# Patient Record
Sex: MALE | Race: WHITE | NOT HISPANIC OR LATINO | Employment: UNEMPLOYED | URBAN - NONMETROPOLITAN AREA
[De-identification: names, ages, dates, MRNs, and addresses within clinical notes are randomized per-mention and may not be internally consistent; named-entity substitution may affect disease eponyms.]

---

## 2019-08-16 ENCOUNTER — APPOINTMENT (OUTPATIENT)
Dept: CT IMAGING | Facility: HOSPITAL | Age: 44
End: 2019-08-16

## 2019-08-16 ENCOUNTER — HOSPITAL ENCOUNTER (INPATIENT)
Facility: HOSPITAL | Age: 44
LOS: 5 days | Discharge: HOME | End: 2019-08-21
Attending: INTERNAL MEDICINE | Admitting: HOSPITALIST

## 2019-08-16 DIAGNOSIS — I74.10 THROMBUS OF AORTA (HCC): Primary | ICD-10-CM

## 2019-08-16 PROBLEM — I99.8 ISCHEMIC TOE: Status: ACTIVE | Noted: 2019-08-16

## 2019-08-16 PROCEDURE — 94760 N-INVAS EAR/PLS OXIMETRY 1: CPT

## 2019-08-16 PROCEDURE — 71275 CT ANGIOGRAPHY CHEST: CPT

## 2019-08-16 PROCEDURE — G0378 HOSPITAL OBSERVATION PER HR: HCPCS

## 2019-08-16 PROCEDURE — 75635 CT ANGIO ABDOMINAL ARTERIES: CPT

## 2019-08-16 PROCEDURE — 84484 ASSAY OF TROPONIN QUANT: CPT | Performed by: INTERNAL MEDICINE

## 2019-08-16 PROCEDURE — 83036 HEMOGLOBIN GLYCOSYLATED A1C: CPT | Performed by: INTERNAL MEDICINE

## 2019-08-16 PROCEDURE — 94799 UNLISTED PULMONARY SVC/PX: CPT

## 2019-08-16 PROCEDURE — 93005 ELECTROCARDIOGRAM TRACING: CPT | Performed by: INTERNAL MEDICINE

## 2019-08-16 RX ORDER — HYDROCODONE BITARTRATE AND ACETAMINOPHEN 5; 325 MG/1; MG/1
1 TABLET ORAL EVERY 6 HOURS PRN
Status: DISCONTINUED | OUTPATIENT
Start: 2019-08-16 | End: 2019-08-21 | Stop reason: HOSPADM

## 2019-08-16 RX ORDER — ACETAMINOPHEN 650 MG/1
650 SUPPOSITORY RECTAL EVERY 4 HOURS PRN
Status: DISCONTINUED | OUTPATIENT
Start: 2019-08-16 | End: 2019-08-21 | Stop reason: HOSPADM

## 2019-08-16 RX ORDER — SODIUM CHLORIDE 9 MG/ML
50 INJECTION, SOLUTION INTRAVENOUS CONTINUOUS
Status: DISCONTINUED | OUTPATIENT
Start: 2019-08-16 | End: 2019-08-18

## 2019-08-16 RX ORDER — ACETAMINOPHEN 160 MG/5ML
650 SOLUTION ORAL EVERY 4 HOURS PRN
Status: DISCONTINUED | OUTPATIENT
Start: 2019-08-16 | End: 2019-08-21 | Stop reason: HOSPADM

## 2019-08-16 RX ORDER — SODIUM CHLORIDE 0.9 % (FLUSH) 0.9 %
3-10 SYRINGE (ML) INJECTION AS NEEDED
Status: DISCONTINUED | OUTPATIENT
Start: 2019-08-16 | End: 2019-08-21 | Stop reason: HOSPADM

## 2019-08-16 RX ORDER — ONDANSETRON 2 MG/ML
4 INJECTION INTRAMUSCULAR; INTRAVENOUS EVERY 6 HOURS PRN
Status: DISCONTINUED | OUTPATIENT
Start: 2019-08-16 | End: 2019-08-21 | Stop reason: HOSPADM

## 2019-08-16 RX ORDER — ACETAMINOPHEN 325 MG/1
650 TABLET ORAL EVERY 4 HOURS PRN
Status: DISCONTINUED | OUTPATIENT
Start: 2019-08-16 | End: 2019-08-21 | Stop reason: HOSPADM

## 2019-08-16 RX ORDER — SODIUM CHLORIDE 0.9 % (FLUSH) 0.9 %
3 SYRINGE (ML) INJECTION EVERY 12 HOURS SCHEDULED
Status: DISCONTINUED | OUTPATIENT
Start: 2019-08-16 | End: 2019-08-20

## 2019-08-16 RX ORDER — NICOTINE 21 MG/24HR
1 PATCH, TRANSDERMAL 24 HOURS TRANSDERMAL
Status: DISCONTINUED | OUTPATIENT
Start: 2019-08-17 | End: 2019-08-21 | Stop reason: HOSPADM

## 2019-08-16 RX ADMIN — IOPAMIDOL 200 ML: 755 INJECTION, SOLUTION INTRAVENOUS at 23:28

## 2019-08-17 ENCOUNTER — APPOINTMENT (OUTPATIENT)
Dept: CARDIOLOGY | Facility: HOSPITAL | Age: 44
End: 2019-08-17

## 2019-08-17 PROBLEM — Z72.0 TOBACCO ABUSE: Status: ACTIVE | Noted: 2019-08-17

## 2019-08-17 PROBLEM — I74.10 AORTIC THROMBUS (HCC): Status: ACTIVE | Noted: 2019-08-17

## 2019-08-17 PROBLEM — E78.2 MIXED HYPERLIPIDEMIA: Status: ACTIVE | Noted: 2019-08-17

## 2019-08-17 PROBLEM — I74.10 THROMBUS OF AORTA: Status: ACTIVE | Noted: 2019-08-17

## 2019-08-17 LAB
ANION GAP SERPL CALCULATED.3IONS-SCNC: 7 MMOL/L (ref 4–13)
APTT PPP: 122.6 SECONDS (ref 24.1–35)
APTT PPP: 32.1 SECONDS (ref 24.1–35)
APTT PPP: 63.6 SECONDS (ref 24.1–35)
ARTICHOKE IGE QN: 117 MG/DL (ref 0–99)
BASOPHILS # BLD AUTO: 0.08 10*3/MM3 (ref 0–0.2)
BASOPHILS NFR BLD AUTO: 0.8 % (ref 0–1.5)
BH CV ECHO MEAS - AO MAX PG (FULL): 1.8 MMHG
BH CV ECHO MEAS - AO MAX PG: 5.3 MMHG
BH CV ECHO MEAS - AO MEAN PG (FULL): 1 MMHG
BH CV ECHO MEAS - AO MEAN PG: 3 MMHG
BH CV ECHO MEAS - AO ROOT AREA (BSA CORRECTED): 1.5
BH CV ECHO MEAS - AO ROOT AREA: 7.1 CM^2
BH CV ECHO MEAS - AO ROOT DIAM: 3 CM
BH CV ECHO MEAS - AO V2 MAX: 115 CM/SEC
BH CV ECHO MEAS - AO V2 MEAN: 88.1 CM/SEC
BH CV ECHO MEAS - AO V2 VTI: 21.3 CM
BH CV ECHO MEAS - AVA(I,A): 3.1 CM^2
BH CV ECHO MEAS - AVA(I,D): 3.1 CM^2
BH CV ECHO MEAS - AVA(V,A): 3.4 CM^2
BH CV ECHO MEAS - AVA(V,D): 3.4 CM^2
BH CV ECHO MEAS - BSA(HAYCOCK): 2 M^2
BH CV ECHO MEAS - BSA: 2 M^2
BH CV ECHO MEAS - BZI_BMI: 26.4 KILOGRAMS/M^2
BH CV ECHO MEAS - BZI_METRIC_HEIGHT: 177.8 CM
BH CV ECHO MEAS - BZI_METRIC_WEIGHT: 83.5 KG
BH CV ECHO MEAS - EDV(CUBED): 128.8 ML
BH CV ECHO MEAS - EDV(MOD-SP4): 122 ML
BH CV ECHO MEAS - EDV(TEICH): 121 ML
BH CV ECHO MEAS - EF(CUBED): 73.1 %
BH CV ECHO MEAS - EF(MOD-SP4): 48.4 %
BH CV ECHO MEAS - EF(TEICH): 64.6 %
BH CV ECHO MEAS - ESV(CUBED): 34.6 ML
BH CV ECHO MEAS - ESV(MOD-SP4): 63 ML
BH CV ECHO MEAS - ESV(TEICH): 42.8 ML
BH CV ECHO MEAS - FS: 35.4 %
BH CV ECHO MEAS - IVS/LVPW: 1.3
BH CV ECHO MEAS - IVSD: 0.92 CM
BH CV ECHO MEAS - LA DIMENSION: 3.4 CM
BH CV ECHO MEAS - LA/AO: 1.1
BH CV ECHO MEAS - LAT PEAK E' VEL: 6.9 CM/SEC
BH CV ECHO MEAS - LV DIASTOLIC VOL/BSA (35-75): 60.6 ML/M^2
BH CV ECHO MEAS - LV MASS(C)D: 142.5 GRAMS
BH CV ECHO MEAS - LV MASS(C)DI: 70.7 GRAMS/M^2
BH CV ECHO MEAS - LV MAX PG: 3.5 MMHG
BH CV ECHO MEAS - LV MEAN PG: 2 MMHG
BH CV ECHO MEAS - LV SYSTOLIC VOL/BSA (12-30): 31.3 ML/M^2
BH CV ECHO MEAS - LV V1 MAX: 93.8 CM/SEC
BH CV ECHO MEAS - LV V1 MEAN: 60.1 CM/SEC
BH CV ECHO MEAS - LV V1 VTI: 16 CM
BH CV ECHO MEAS - LVIDD: 5.1 CM
BH CV ECHO MEAS - LVIDS: 3.3 CM
BH CV ECHO MEAS - LVLD AP4: 8.4 CM
BH CV ECHO MEAS - LVLS AP4: 7.8 CM
BH CV ECHO MEAS - LVOT AREA (M): 4.2 CM^2
BH CV ECHO MEAS - LVOT AREA: 4.2 CM^2
BH CV ECHO MEAS - LVOT DIAM: 2.3 CM
BH CV ECHO MEAS - LVPWD: 0.72 CM
BH CV ECHO MEAS - MED PEAK E' VEL: 7.83 CM/SEC
BH CV ECHO MEAS - MV A MAX VEL: 55.8 CM/SEC
BH CV ECHO MEAS - MV DEC SLOPE: 306 CM/SEC^2
BH CV ECHO MEAS - MV DEC TIME: 0.23 SEC
BH CV ECHO MEAS - MV E MAX VEL: 68.9 CM/SEC
BH CV ECHO MEAS - MV E/A: 1.2
BH CV ECHO MEAS - SI(AO): 74.7 ML/M^2
BH CV ECHO MEAS - SI(CUBED): 46.7 ML/M^2
BH CV ECHO MEAS - SI(LVOT): 33 ML/M^2
BH CV ECHO MEAS - SI(MOD-SP4): 29.3 ML/M^2
BH CV ECHO MEAS - SI(TEICH): 38.8 ML/M^2
BH CV ECHO MEAS - SV(AO): 150.6 ML
BH CV ECHO MEAS - SV(CUBED): 94.1 ML
BH CV ECHO MEAS - SV(LVOT): 66.5 ML
BH CV ECHO MEAS - SV(MOD-SP4): 59 ML
BH CV ECHO MEAS - SV(TEICH): 78.2 ML
BH CV ECHO MEASUREMENTS AVERAGE E/E' RATIO: 9.36
BUN BLD-MCNC: 6 MG/DL (ref 5–21)
BUN/CREAT SERPL: 5.9 (ref 7–25)
CALCIUM SPEC-SCNC: 9.6 MG/DL (ref 8.4–10.4)
CHLORIDE SERPL-SCNC: 102 MMOL/L (ref 98–110)
CHOLEST SERPL-MCNC: 163 MG/DL (ref 130–200)
CO2 SERPL-SCNC: 30 MMOL/L (ref 24–31)
CREAT BLD-MCNC: 1.02 MG/DL (ref 0.5–1.4)
DEPRECATED RDW RBC AUTO: 41.2 FL (ref 37–54)
EOSINOPHIL # BLD AUTO: 0.24 10*3/MM3 (ref 0–0.4)
EOSINOPHIL NFR BLD AUTO: 2.4 % (ref 0.3–6.2)
ERYTHROCYTE [DISTWIDTH] IN BLOOD BY AUTOMATED COUNT: 13 % (ref 12.3–15.4)
GFR SERPL CREATININE-BSD FRML MDRD: 79 ML/MIN/1.73
GLUCOSE BLD-MCNC: 91 MG/DL (ref 70–100)
HBA1C MFR BLD: 5.9 % (ref 4.8–5.9)
HCT VFR BLD AUTO: 49.5 % (ref 37.5–51)
HDLC SERPL-MCNC: 21 MG/DL
HGB BLD-MCNC: 16.9 G/DL (ref 13–17.7)
IMM GRANULOCYTES # BLD AUTO: 0.02 10*3/MM3 (ref 0–0.05)
IMM GRANULOCYTES NFR BLD AUTO: 0.2 % (ref 0–0.5)
INR PPP: 0.92 (ref 0.91–1.09)
LDLC/HDLC SERPL: 4.51 {RATIO}
LEFT ATRIUM VOLUME INDEX: 15.2 ML/M2
LEFT ATRIUM VOLUME: 30.5 CM3
LV EF 2D ECHO EST: 60 %
LYMPHOCYTES # BLD AUTO: 3.42 10*3/MM3 (ref 0.7–3.1)
LYMPHOCYTES NFR BLD AUTO: 34 % (ref 19.6–45.3)
MAXIMAL PREDICTED HEART RATE: 176 BPM
MCH RBC QN AUTO: 30 PG (ref 26.6–33)
MCHC RBC AUTO-ENTMCNC: 34.1 G/DL (ref 31.5–35.7)
MCV RBC AUTO: 87.8 FL (ref 79–97)
MONOCYTES # BLD AUTO: 0.68 10*3/MM3 (ref 0.1–0.9)
MONOCYTES NFR BLD AUTO: 6.8 % (ref 5–12)
NEUTROPHILS # BLD AUTO: 5.63 10*3/MM3 (ref 1.7–7)
NEUTROPHILS NFR BLD AUTO: 55.8 % (ref 42.7–76)
NRBC BLD AUTO-RTO: 0 /100 WBC (ref 0–0.2)
PLATELET # BLD AUTO: 285 10*3/MM3 (ref 140–450)
PMV BLD AUTO: 9.5 FL (ref 6–12)
POTASSIUM BLD-SCNC: 4.7 MMOL/L (ref 3.5–5.3)
PROTHROMBIN TIME: 12.6 SECONDS (ref 11.9–14.6)
RBC # BLD AUTO: 5.64 10*6/MM3 (ref 4.14–5.8)
SODIUM BLD-SCNC: 139 MMOL/L (ref 135–145)
STRESS TARGET HR: 150 BPM
TRIGL SERPL-MCNC: 236 MG/DL (ref 0–149)
TROPONIN I SERPL-MCNC: 0.03 NG/ML (ref 0–0.03)
TROPONIN I SERPL-MCNC: 0.03 NG/ML (ref 0–0.03)
WBC NRBC COR # BLD: 10.07 10*3/MM3 (ref 3.4–10.8)

## 2019-08-17 PROCEDURE — 85730 THROMBOPLASTIN TIME PARTIAL: CPT | Performed by: SURGERY

## 2019-08-17 PROCEDURE — 0 IOPAMIDOL PER 1 ML: Performed by: INTERNAL MEDICINE

## 2019-08-17 PROCEDURE — 93306 TTE W/DOPPLER COMPLETE: CPT

## 2019-08-17 PROCEDURE — 99222 1ST HOSP IP/OBS MODERATE 55: CPT | Performed by: SURGERY

## 2019-08-17 PROCEDURE — 25010000002 HEPARIN (PORCINE) PER 1000 UNITS: Performed by: SURGERY

## 2019-08-17 PROCEDURE — 93306 TTE W/DOPPLER COMPLETE: CPT | Performed by: INTERNAL MEDICINE

## 2019-08-17 PROCEDURE — 85610 PROTHROMBIN TIME: CPT | Performed by: SURGERY

## 2019-08-17 PROCEDURE — 80061 LIPID PANEL: CPT | Performed by: INTERNAL MEDICINE

## 2019-08-17 PROCEDURE — 84484 ASSAY OF TROPONIN QUANT: CPT | Performed by: INTERNAL MEDICINE

## 2019-08-17 PROCEDURE — 80048 BASIC METABOLIC PNL TOTAL CA: CPT | Performed by: INTERNAL MEDICINE

## 2019-08-17 PROCEDURE — 93010 ELECTROCARDIOGRAM REPORT: CPT | Performed by: INTERNAL MEDICINE

## 2019-08-17 PROCEDURE — 94799 UNLISTED PULMONARY SVC/PX: CPT

## 2019-08-17 PROCEDURE — 94760 N-INVAS EAR/PLS OXIMETRY 1: CPT

## 2019-08-17 PROCEDURE — 85025 COMPLETE CBC W/AUTO DIFF WBC: CPT | Performed by: INTERNAL MEDICINE

## 2019-08-17 RX ORDER — HYDROCODONE BITARTRATE AND ACETAMINOPHEN 10; 325 MG/1; MG/1
1 TABLET ORAL EVERY 6 HOURS PRN
Status: DISCONTINUED | OUTPATIENT
Start: 2019-08-17 | End: 2019-08-21 | Stop reason: HOSPADM

## 2019-08-17 RX ORDER — HEPARIN SODIUM 1000 [USP'U]/ML
80 INJECTION, SOLUTION INTRAVENOUS; SUBCUTANEOUS ONCE
Status: COMPLETED | OUTPATIENT
Start: 2019-08-17 | End: 2019-08-17

## 2019-08-17 RX ORDER — HEPARIN SODIUM 10000 [USP'U]/100ML
1500 INJECTION, SOLUTION INTRAVENOUS
Status: DISCONTINUED | OUTPATIENT
Start: 2019-08-17 | End: 2019-08-20

## 2019-08-17 RX ORDER — HEPARIN SODIUM 1000 [USP'U]/ML
80 INJECTION, SOLUTION INTRAVENOUS; SUBCUTANEOUS AS NEEDED
Status: DISCONTINUED | OUTPATIENT
Start: 2019-08-17 | End: 2019-08-20

## 2019-08-17 RX ORDER — ATORVASTATIN CALCIUM 40 MG/1
40 TABLET, FILM COATED ORAL NIGHTLY
Status: DISCONTINUED | OUTPATIENT
Start: 2019-08-17 | End: 2019-08-21 | Stop reason: HOSPADM

## 2019-08-17 RX ORDER — HEPARIN SODIUM 1000 [USP'U]/ML
40 INJECTION, SOLUTION INTRAVENOUS; SUBCUTANEOUS AS NEEDED
Status: DISCONTINUED | OUTPATIENT
Start: 2019-08-17 | End: 2019-08-20

## 2019-08-17 RX ADMIN — NICOTINE 1 PATCH: 21 PATCH, EXTENDED RELEASE TRANSDERMAL at 09:37

## 2019-08-17 RX ADMIN — SODIUM CHLORIDE 50 ML/HR: 9 INJECTION, SOLUTION INTRAVENOUS at 17:03

## 2019-08-17 RX ADMIN — SODIUM CHLORIDE 75 ML/HR: 9 INJECTION, SOLUTION INTRAVENOUS at 00:06

## 2019-08-17 RX ADMIN — DESMOPRESSIN ACETATE 40 MG: 0.2 TABLET ORAL at 20:22

## 2019-08-17 RX ADMIN — HEPARIN SODIUM 3340 UNITS: 1000 INJECTION, SOLUTION INTRAVENOUS; SUBCUTANEOUS at 20:50

## 2019-08-17 RX ADMIN — HEPARIN SODIUM 1500 UNITS/HR: 10000 INJECTION, SOLUTION INTRAVENOUS at 08:40

## 2019-08-17 RX ADMIN — HEPARIN SODIUM 6690 UNITS: 1000 INJECTION INTRAVENOUS; SUBCUTANEOUS at 08:39

## 2019-08-17 NOTE — H&P
[facility]  HISTORY AND PHYSICAL    Date of Admission: 8/16/2019  Primary Care Physician: Provider, No Known    Subjective     Chief Complaint: transferred from outside hospital for vascular surgeon evaluation due to concern for ischemic toe    History of Present Illness  44-year old man transferred from [facility] for further evaluation and management of concern for ischemic right big toe.  Symptoms of pain had been ongoing for the past couple of weeks.  Patient reportedly noted pinkish discoloration of right big toe which evolved into red and blue and purple dusky discoloration.  He has no significant past medical history.  He only smokes a pack to 1-1/2 pack/day since teenager.  He is not aware of his cholesterol.  I am told by transferring physician that there was T wave inversions in V4 to V6 and has poor R wave progression on his chest leads does the requested for hospitalist service instead of admitting to vascular surgery.  I have confirmed the EKG and has no prior EKG for comparison.  He denies any chest pain, shortness of breath, palpitation, dizziness,.  Troponin is negative from transferring facility.  He received Lovenox treatment dose prior to transfer.  I discussed case with Dr. Rivera who recommends CT angiogram of the chest, CT of the abdominal aorta bilateral iliofemoral runoff with and without contrast and an echocardiogram.   No further anticoagulation needed at this time until seen by vascular surgeon as discussed.    ALBERTO on the left is 0.96 while on the right is 1.01.  DPA is 82, normal until blood gets to the right toe and reportedly has flat line.  Review of Systems   No cp, no sob  No palpitation  No dizziness  No issue on BM, urination  No headache  No fever  No hx of syncope  Otherwise complete ROS reviewed and negative except as mentioned in the HPI.    Past Medical History: no endorsed medical problem  Past  "Surgical History: none  Social History:  1 pack to 1 1/2 pack since teenager. Seldom drinks alcohol; last drink 1 month ago  Denies any recreational drug use;     Family History:  Mom had bell's palsy; dad passed on with \"three different kidn of cancers\".  Said he  Had brain cancer  Allergies:   NKDA  Medications: None  Prior to Admission medications    Not on File     Objective     Vital Signs: /74 (BP Location: Left arm, Patient Position: Lying)   Pulse 66   Temp 98 °F (36.7 °C) (Oral)   Resp 16   Ht 177.8 cm (70\")   Wt 83.6 kg (184 lb 4.9 oz)   SpO2 96%   BMI 26.44 kg/m²   Physical Exam   Constitutional: He is oriented to person, place, and time. He appears well-developed. No distress.   HENT:   Head: Normocephalic and atraumatic.   Right Ear: External ear normal.   Left Ear: External ear normal.   Nose: Nose normal.   Mouth/Throat: Oropharynx is clear and moist. No oropharyngeal exudate.   Eyes: Conjunctivae and EOM are normal. Pupils are equal, round, and reactive to light. Right eye exhibits no discharge. Left eye exhibits no discharge. No scleral icterus.   Neck: Normal range of motion. Neck supple. No JVD present. No tracheal deviation present. No thyromegaly present.   No carotid bruit   Cardiovascular: Normal rate, regular rhythm and normal heart sounds. Exam reveals no friction rub.   No murmur heard.  +1 pulses of the posterior tibialis bilateral and dorsalis pedis on left foot; diminished on the right Acharya pedis.   Pulmonary/Chest: Effort normal and breath sounds normal. No stridor. No respiratory distress. He has no wheezes. He has no rales.   Abdominal: Soft. Bowel sounds are normal. He exhibits no distension and no mass. There is no tenderness. There is no rebound and no guarding.   Musculoskeletal: Normal range of motion. He exhibits no tenderness or deformity.   Purplish discoloration of the plantar surface of right big toe with normal extension versus unkempt foot same discoloration. "   Neurological: He is alert and oriented to person, place, and time. He displays normal reflexes. No cranial nerve deficit. He exhibits normal muscle tone. Coordination normal.   Skin: Skin is warm and dry. Capillary refill takes less than 2 seconds. He is not diaphoretic.   Colder right big toe compared to other foot. No gross tenderness or differential warmth   Psychiatric: He has a normal mood and affect. His behavior is normal. Judgment and thought content normal.   Vitals reviewed.        Results Reviewed:  Ankle-brachial index as reported above  White count was 10.5, hemoglobin 16.8, hematocrit 51, platelet of 287  CRP is 10.3  PT is 10.2, INR 1  Sodium 137, potassium 4.5, chloride 100, CO2 30, glucose 94, creatinine 1.3, total protein 8.2, albumin 3.9, calcium 9.1, bilirubin total is 0.3, AST 21, ALT 41, alkaline phosphatase 79    Lab Results (last 24 hours)     ** No results found for the last 24 hours. **        Imaging Results (last 24 hours)     ** No results found for the last 24 hours. **        I have personally reviewed and interpreted the radiology studies and ECG obtained at time of admission.     Assessment / Plan     Assessment:   There are no active hospital problems to display for this patient.    Blue toe syndrome per Dr. Rivera   ? Ischemic right big toe  abnormal ekg T wve inversion anterolateral leads w/o chest pain or concern for anginal equivalent  Tobacco abuse continuous.   Plan:      Echo   cta of chest   abd aorta cta with iliofemoral run off  Check ekg and serial markers  Smoking cessation counseling  For modifiable cardiovascular risk factors  Other plan per orders  He received Lovenox already at [facility].  This should cover appropriately in the morning until seen by Vascular surgeon and make his recommendation   Code Status:  Full code     I discussed the patient's findings and my recommendations with the patient and nurse    Estimated length of stay  To be determined  Jameson Mcmillan  MD Veronica   08/16/19   10:30 PM

## 2019-08-17 NOTE — PROGRESS NOTES
[facility]  INPATIENT PROGRESS NOTE    Patient Name: Fiona Laguerre  Date of Admission: 8/16/2019  Today's Date: 08/17/19  Length of Stay: 0  Primary Care Physician: Manas Zhu MD    Subjective   Chief Complaint: Discoloration of the right great toe.   HPI   This is a 44-year-old  gentleman admitted in transfer last night after presenting to [facility] in [place] with complaints of pain in his right great toe and discoloration.  He has no significant medical history other than smoking.  However, he does not follow with primary care physician.  He has been found to have a distal aortic thrombus and has been placed on a heparin drip.  Vascular surgery is involved.    He first noticed discomfort and pain in his toe about 2 weeks ago and then started developing discoloration.    He has never had any problems with clotting previously.  He is unaware of any familial history of problems with clotting.    Review of Systems   All pertinent negatives and positives are as above. All other systems have been reviewed and are negative unless otherwise stated.     Objective    Temp:  [97.5 °F (36.4 °C)-98 °F (36.7 °C)] 98 °F (36.7 °C)  Heart Rate:  [66-76] 74  Resp:  [16] 16  BP: (117-127)/(71-74) 127/73  Physical Exam   Constitutional: He is oriented to person, place, and time. He appears well-developed and well-nourished.   Up in bed.  No distress.  Watching television.  No family present.  Discussed with his nurse, Joy.   HENT:   Head: Normocephalic and atraumatic.   Eyes: Conjunctivae and EOM are normal. Pupils are equal, round, and reactive to light.   Neck: Neck supple. No JVD present.   Cardiovascular: Normal rate, regular rhythm, normal heart sounds and intact distal pulses. Exam reveals no gallop and no friction rub.   No murmur heard.  Pulmonary/Chest: Effort normal and breath sounds normal. No respiratory distress. He has no wheezes. He has  no rales. He exhibits no tenderness.   Abdominal: Soft. Bowel sounds are normal. He exhibits no distension. There is no tenderness. There is no rebound and no guarding.   Musculoskeletal: Normal range of motion. He exhibits no edema, tenderness or deformity.   Neurological: He is alert and oriented to person, place, and time. He displays normal reflexes. No cranial nerve deficit. He exhibits normal muscle tone.   Skin: Skin is warm and dry. No rash noted.   Right great toe is dusky with a purplish discoloration.  Extremely poor capillary refill of the nailbed. See attached photo.   Psychiatric: He has a normal mood and affect. His behavior is normal. Judgment and thought content normal.         Results Review:  I have reviewed the labs, radiology results, and diagnostic studies.    Laboratory Data:   Results from last 7 days   Lab Units 08/17/19  0442   WBC 10*3/mm3 10.07   HEMOGLOBIN g/dL 16.9   HEMATOCRIT % 49.5   PLATELETS 10*3/mm3 285     Results from last 7 days   Lab Units 08/17/19  0442   SODIUM mmol/L 139   POTASSIUM mmol/L 4.7   CHLORIDE mmol/L 102   CO2 mmol/L 30.0   BUN mg/dL 6   CREATININE mg/dL 1.02   CALCIUM mg/dL 9.6   GLUCOSE mg/dL 91     Lab Results   Component Value Date    HGBA1C 5.9 08/16/2019    CHOL 163 08/17/2019    HDL 21 (L) 08/17/2019     (H) 08/17/2019    TRIG 236 (H) 08/17/2019     Radiology Data:   Imaging Results (last 24 hours)     Procedure Component Value Units Date/Time    CT Angio Abdominal Aorta Bilateral Iliofem Runoff With & Without Contrast [[id number]] Collected:  08/17/19 0805     Updated:  08/17/19 0815    Narrative:       Exam: CT ANGIO ABDOMINAL AORTA BILAT ILIOFEM RUNOFF W WO CONTRAST-     Indication: Peripheral arterial disease, blue toe syndrome     Comparison: None available.     DOSE LENGTH PRODUCT: 461.8 mGy cm. Automated exposure control was also  utilized to decrease patient radiation dose.     Findings:     Vascular findings:      The abdominal aorta is  normal in caliber. The celiac trunk and SMA are  widely patent. No definite opacification of the BROOKE. Renal arteries are  widely patent. There is a nonocclusive thrombus at the aortic  bifurcation extending into both proximal common iliac arteries. This is  best demonstrated on coronal image 60 of series 7. Both common iliac  arteries maintain patency.     On the right, the right internal iliac, external iliac, common femoral,  deep femoral, superficial femoral, impossible to artery are widely  patent. Normal trifurcation with three-vessel runoff through the foot.  Dorsalis pedis and plantar artery are patent.     On the left, the left internal iliac, external iliac, common femoral,  deep femoral, superficial femoral, and popliteal arteries are widely  patent. Normal trifurcation with three-vessel runoff through the foot.  Dorsalis pedis and plantar arteries are opacified.     Nonvascular findings:      No arterial enhancing liver lesion. Gallbladder appears normal. No  biliary ductal dilatation. Pancreas, spleen, and right adrenal gland  appear normal. Indeterminate 1.3 cm left adrenal gland nodule. No solid  renal mass. No urolithiasis or hydronephrosis. No focal urinary bladder  wall abnormality. Prostate is unremarkable. No abnormal bowel distention  or adjacent inflammation. No ascites or free pelvic fluid. No pelvic  mass or organized pelvic collections. Small left inguinal fat-containing  hernia. No enlarged abdominal or pelvic lymph nodes. No suspicious  osseous finding.          Impression:          Large nonocclusive thrombus at the aortic bifurcation extending into  both common iliac artery origins (coronal image 59, series 7). This  could represent source of reported distal emboli. The BROOKE is not  definitely opacified, which may be a chronic finding. Otherwise, no  major vascular occlusion is identified. Bilateral three-vessel runoff is  maintained.     Indeterminate 1.3 cm left adrenal gland nodule.  This likely represents  an adenoma but if clinically indicated, further evaluation could be  obtained with CT/MR adrenal protocol.     -- Findings of thrombus discussed discussed with current provider.  Patient is to be evaluated by vascular surgery.  This report was finalized on 08/17/2019 08:12 by Dr. Leo Mercedes MD.    CT Angiogram Chest With & Without Contrast [[id number]] Collected:  08/17/19 0744     Updated:  08/17/19 0752    Narrative:       Exam: CT ANGIOGRAM CHEST W WO CONTRAST-     Indication: search of clot; PAD     Comparison: None available.     DOSE LENGTH PRODUCT: 225 mGy cm. Automated exposure control was also  utilized to decrease patient radiation dose.     Findings:     No pulmonary embolus identified. Main pulmonary trunk is normal in  caliber. Thoracic aorta appears normal. No pericardial effusion.     The central airways are clear. No consolidation, pleural effusion, or  pneumothorax. No suspicious pulmonary nodule. No enlarged thoracic lymph  nodes. No acute finding in the upper abdomen. No suspicious osseous  finding.       Impression:          No evidence of pulmonary embolus or other acute finding.      These findings are in agreement with the critical and emergent findings  from the [facility] preliminary report.  This report was finalized on 08/17/2019 07:49 by Dr. Leo Mercedes MD.        I have reviewed the patient's current medications.     Assessment/Plan     Active Hospital Problems    Diagnosis   • **Ischemic toe   • Thrombus of aorta (CMS/HCC)   • Tobacco abuse   • Mixed hyperlipidemia     Plan:   The patient was admitted on 8/16 by Dr. Martin.  He was transferred to our facility from [facility] in [place], [place] after presenting there with pain and discoloration of his right great toe.  They were interested in vascular surgery consultation.    He underwent CTA of the chest, abdomen, and pelvis with runoff last night.  I was contacted by Dr. Mercedes  from radiology earlier this morning the means of a large, nonocclusive thrombus of the aortic bifurcation extending into both common iliac artery origins.  The patient has embolized this to his right great toe causing his current condition.    The patient has been evaluated by Dr. Rivera.  I have discussed the case with him.  The patient has been placed on a heparin drip.  He plans to take the patient to the operative suite on Monday for angiogram and aortoiliac stent placement.    In the meantime, we are also obtaining an echocardiogram to rule out intracardiac thrombus.  He has been NSR on telemetry thus far.     He also needs a hypercoagulable panel done.    He has mixed hyperlipidemia and has been started on a statin.    His hemoglobin A1c is normal.  He has not displayed any hypertension thus far.    Nicotine patch has been offered and he desperately needs to stop smoking.    Discharge Planning: Indeterminate at present.  He recently signed up for insurance.  He was told that Dr. Manas Zhu in [place] would be his primary care doctor.  He has not made an appointment with him yet.    Jae Vasquez,    08/17/19   9:23 AM

## 2019-08-17 NOTE — PLAN OF CARE
Problem: Patient Care Overview  Goal: Plan of Care Review  Outcome: Ongoing (interventions implemented as appropriate)   08/17/19 0046   Coping/Psychosocial   Plan of Care Reviewed With patient   Plan of Care Review   Progress no change   OTHER   Outcome Summary Safety maintained. VSS. IV fluids started. ct scaned performed, awaiting results. Pt denies pain or discomfort. Tele on. Will continue to monitor.     Goal: Individualization and Mutuality  Outcome: Ongoing (interventions implemented as appropriate)    Goal: Discharge Needs Assessment  Outcome: Ongoing (interventions implemented as appropriate)    Goal: Interprofessional Rounds/Family Conf  Outcome: Ongoing (interventions implemented as appropriate)      Problem: Pain, Acute (Adult)  Goal: Identify Related Risk Factors and Signs and Symptoms  Outcome: Ongoing (interventions implemented as appropriate)    Goal: Acceptable Pain Control/Comfort Level  Outcome: Ongoing (interventions implemented as appropriate)      Problem: Tissue Perfusion, Ineffective Peripheral (Adult)  Goal: Identify Related Risk Factors and Signs and Symptoms  Outcome: Ongoing (interventions implemented as appropriate)    Goal: Adequate Tissue Perfusion  Outcome: Ongoing (interventions implemented as appropriate)

## 2019-08-17 NOTE — PLAN OF CARE
Problem: Patient Care Overview  Goal: Plan of Care Review  Outcome: Ongoing (interventions implemented as appropriate)   08/17/19 9730   Coping/Psychosocial   Plan of Care Reviewed With patient   Plan of Care Review   Progress no change   OTHER   Outcome Summary Heparin drip infusing. Right great toe remains bluish. Denies numbness/tingling. Heart monitor continues. Denies need for pain med.      Goal: Individualization and Mutuality  Outcome: Ongoing (interventions implemented as appropriate)    Goal: Discharge Needs Assessment  Outcome: Ongoing (interventions implemented as appropriate)      Problem: Pain, Acute (Adult)  Goal: Identify Related Risk Factors and Signs and Symptoms  Outcome: Ongoing (interventions implemented as appropriate)    Goal: Acceptable Pain Control/Comfort Level  Outcome: Ongoing (interventions implemented as appropriate)      Problem: Tissue Perfusion, Ineffective Peripheral (Adult)  Goal: Identify Related Risk Factors and Signs and Symptoms  Outcome: Ongoing (interventions implemented as appropriate)

## 2019-08-17 NOTE — CONSULTS
Fiona Laguerre  [id number]  [id number]  386/1  Jae Vasquez, DO  8/16/2019     CC: R great toe pain, discoloration    HPI: Fiona Laguerre is a 44 y.o. male who presented to [facility] with complaints of discoloration of his right first toe.  He reports a proximally 2 weeks ago he noted some red and purplish discoloration as well as pain to the right great toe.  He initially attributed this to gout as some of his friends that had similar findings and had been diagnosed with gout.  However he did not seek any medical attention at that time.  However he reports over the past 2 weeks that the toe would become at times more painful as well as had more dusky discoloration and so finally that prompted him to report to the emergency room at [facility] yesterday.  At that time arterial duplex was done which showed as per report from the ER physician there are normal flow in the common femoral arteries, SFA, popliteal, and tibial arteries bilaterally.  As well there was noted to be normal flow in the dorsalis pedis bilaterally however there was no obvious flow within the terminal branches of the right first toe.  He was also noted to have palpable dorsalis pedis and posterior tibial pulses bilaterally on exam.  He was given a dose of therapeutic Lovenox at [facility] however vascular surgery there was unavailable and so I was contacted via the transfer center.  Ultimately the patient was transferred here to the hospitalist service for further evaluation.  When seen this morning he reports feeling well.  He reports minimal pain to the right first toe and no new discoloration or any other findings.  He has had no similar episodes in the past.  He denies any prior past medical history however has not seen a doctor for any kind of preventative care since he was a teenager.  He denies taking any home medications.  He is an active smoker and smokes about 1 to 1-1/2 packs a day.   "He denies any prior history of DVT either in himself or his family.  He denies any history of any hypercoagulable disorders in any family members that he is aware of.  He was admitted here to the hospitalist service overnight after transfer and CTA of the chest as well as abdomen pelvis with lower extremity runoff was performed after I discussed the case with the hospitalist.  I did review these images personally this morning.  The CTA of the chest shows no obvious abnormality.  However the abdominal aorta is noted to have an area of thrombus at the aortic bifurcation with extension into the bilateral common iliac arteries.  The iliacs do however remain patent and distal to this the internal iliacs both also remain patent and runoff shows no other abnormalities of the lower extremity vasculature.  An echocardiogram is also pending.    History reviewed. No pertinent past medical history.    History reviewed. No pertinent surgical history.    History reviewed. No pertinent family history.    Social History     Socioeconomic History   • Marital status: Single     Spouse name: Not on file   • Number of children: Not on file   • Years of education: Not on file   • Highest education level: Not on file   Tobacco Use   • Smoking status: Heavy Tobacco Smoker     Packs/day: 1.50     Years: 25.00     Pack years: 37.50     Types: Cigarettes   • Smokeless tobacco: Never Used       No Known Allergies    Hospital Medications (active)       Dose Frequency Start End    acetaminophen (TYLENOL) 160 MG/5ML solution 650 mg 650 mg Every 4 Hours PRN 8/16/2019     Sig - Route: Take 20.3 mL by mouth Every 4 (Four) Hours As Needed for Mild Pain . - Oral    Linked Group 1:  \"Or\" Linked Group Details        acetaminophen (TYLENOL) suppository 650 mg 650 mg Every 4 Hours PRN 8/16/2019     Sig - Route: Insert 1 suppository into the rectum Every 4 (Four) Hours As Needed for Mild Pain . - Rectal    Linked Group 1:  \"Or\" Linked Group Details        " "acetaminophen (TYLENOL) tablet 650 mg 650 mg Every 4 Hours PRN 8/16/2019     Sig - Route: Take 2 tablets by mouth Every 4 (Four) Hours As Needed for Mild Pain . - Oral    Linked Group 1:  \"Or\" Linked Group Details        atorvastatin (LIPITOR) tablet 40 mg 40 mg Nightly 8/17/2019     Sig - Route: Take 1 tablet by mouth Every Night. - Oral    heparin (porcine) injection 3,340 Units 40 Units/kg × 83.6 kg As Needed 8/17/2019     Sig - Route: Infuse 3.34 mL into a venous catheter As Needed (Per Heparin Nomogram - aPTT 58-67). - Intravenous    heparin (porcine) injection 6,690 Units 80 Units/kg × 83.6 kg Once 8/17/2019     Sig - Route: Infuse 6.69 mL into a venous catheter 1 (One) Time. - Intravenous    heparin (porcine) injection 6,690 Units 80 Units/kg × 83.6 kg As Needed 8/17/2019     Sig - Route: Infuse 6.69 mL into a venous catheter As Needed (Per Heparin Nomogram - aPTT Less Than 57). - Intravenous    heparin 58527 units/250 mL (100 units/mL) in 0.45 % NaCl infusion 1,500 Units/hr Titrated 8/17/2019     Sig - Route: Infuse 1,500 Units/hr into a venous catheter Dose Adjusted By Provider As Needed. - Intravenous    HYDROcodone-acetaminophen (NORCO) 5-325 MG per tablet 1 tablet 1 tablet Every 6 Hours PRN 8/16/2019 8/26/2019    Sig - Route: Take 1 tablet by mouth Every 6 (Six) Hours As Needed for Moderate Pain . - Oral    iopamidol (ISOVUE-370) 76 % injection 200 mL 200 mL Once in Imaging 8/17/2019 8/16/2019    Sig - Route: Infuse 200 mL into a venous catheter Once. - Intravenous    nicotine (NICODERM CQ) 21 MG/24HR patch 1 patch 1 patch Every 24 Hours Scheduled 8/17/2019     Sig - Route: Place 1 patch on the skin as directed by provider Daily. - Transdermal    ondansetron (ZOFRAN) injection 4 mg 4 mg Every 6 Hours PRN 8/16/2019     Sig - Route: Infuse 2 mL into a venous catheter Every 6 (Six) Hours As Needed for Nausea or Vomiting. - Intravenous    sodium chloride 0.9 % flush 3 mL 3 mL Every 12 Hours Scheduled " 8/16/2019     Sig - Route: Infuse 3 mL into a venous catheter Every 12 (Twelve) Hours. - Intravenous    sodium chloride 0.9 % flush 3-10 mL 3-10 mL As Needed 8/16/2019     Sig - Route: Infuse 3-10 mL into a venous catheter As Needed for Line Care. - Intravenous    sodium chloride 0.9 % infusion 75 mL/hr Continuous 8/16/2019     Sig - Route: Infuse 75 mL/hr into a venous catheter Continuous. - Intravenous          Review of Systems   Constitutional: Negative.  Negative for activity change, appetite change, chills, diaphoresis, fatigue and fever.   HENT: Negative.  Negative for congestion, sneezing, sore throat and trouble swallowing.    Eyes: Negative.  Negative for visual disturbance.   Respiratory: Negative.  Negative for chest tightness and shortness of breath.    Cardiovascular: Negative.  Negative for chest pain, palpitations and leg swelling.   Gastrointestinal: Negative.  Negative for abdominal distention, abdominal pain, nausea and vomiting.   Endocrine: Negative.    Genitourinary: Negative.    Musculoskeletal: Negative.    Skin:        Discoloration of the right first toe.   Allergic/Immunologic: Negative.    Neurological: Negative.         He does have some decreased sensation to the right first toe given the ischemic nature.   Hematological: Negative.    Psychiatric/Behavioral: Negative.        Physical Exam   Constitutional: He is oriented to person, place, and time. He appears well-developed and well-nourished.   HENT:   Head: Normocephalic and atraumatic.   Eyes: EOM are normal. Pupils are equal, round, and reactive to light.   Neck: Normal range of motion. Neck supple. No JVD present.   Cardiovascular: Normal rate, regular rhythm, intact distal pulses and normal pulses.   Pulses:       Carotid pulses are 2+ on the right side, and 2+ on the left side.       Radial pulses are 2+ on the right side, and 2+ on the left side.        Femoral pulses are 2+ on the right side, and 2+ on the left side.        Popliteal pulses are 2+ on the right side, and 2+ on the left side.        Dorsalis pedis pulses are 2+ on the right side, and 2+ on the left side.        Posterior tibial pulses are 2+ on the right side, and 2+ on the left side.   His right first toe is ischemic appearing and somewhat dusky throughout the entire digit.  He does have motor function to be able to move that toe however does have decreased sensation about that toe.  All other digits on the right foot and all the digits on the left foot appear normal with no signs of ischemia.  He does have equally palpable bilateral femoral, popliteal, DP, and PT pulses.  Both feet are otherwise warm and well perfused appearing.   Pulmonary/Chest: Effort normal. No respiratory distress.   Abdominal: Soft. He exhibits no distension and no mass. There is no tenderness.   Musculoskeletal: Normal range of motion. He exhibits tenderness (He does have some mild tenderness with palpation of the right first toe.). He exhibits no edema or deformity.   Neurological: He is alert and oriented to person, place, and time.   Skin: Skin is warm and dry. Capillary refill takes less than 2 seconds. Capillary refill is severely delayed in the right first toe.  Otherwise all digits of the feet exhibit l capillary refill of less than 2 seconds..   Psychiatric: He has a normal mood and affect. His behavior is normal. Judgment and thought content normal.   Vitals reviewed.      Laboratory Data:  Results from last 7 days   Lab Units 08/17/19  0442   WBC 10*3/mm3 10.07   HEMOGLOBIN g/dL 16.9   HEMATOCRIT % 49.5   PLATELETS 10*3/mm3 285       Results from last 7 days   Lab Units 08/17/19  0442   SODIUM mmol/L 139   POTASSIUM mmol/L 4.7   CHLORIDE mmol/L 102   CO2 mmol/L 30.0   BUN mg/dL 6   CREATININE mg/dL 1.02   CALCIUM mg/dL 9.6   GLUCOSE mg/dL 91     Results from last 7 days   Lab Units 08/17/19  0733   INR  0.92   APTT seconds 32.1         Diagnostic Data:  Imaging Results (last 24 hours)      Procedure Component Value Units Date/Time    CT Angio Abdominal Aorta Bilateral Iliofem Runoff With & Without Contrast [[id number]] Collected:  08/17/19 0805     Updated:  08/17/19 0815    Narrative:       Exam: CT ANGIO ABDOMINAL AORTA BILAT ILIOFEM RUNOFF W WO CONTRAST-     Indication: Peripheral arterial disease, blue toe syndrome     Comparison: None available.     DOSE LENGTH PRODUCT: 461.8 mGy cm. Automated exposure control was also  utilized to decrease patient radiation dose.     Findings:     Vascular findings:      The abdominal aorta is normal in caliber. The celiac trunk and SMA are  widely patent. No definite opacification of the BROOKE. Renal arteries are  widely patent. There is a nonocclusive thrombus at the aortic  bifurcation extending into both proximal common iliac arteries. This is  best demonstrated on coronal image 60 of series 7. Both common iliac  arteries maintain patency.     On the right, the right internal iliac, external iliac, common femoral,  deep femoral, superficial femoral, impossible to artery are widely  patent. Normal trifurcation with three-vessel runoff through the foot.  Dorsalis pedis and plantar artery are patent.     On the left, the left internal iliac, external iliac, common femoral,  deep femoral, superficial femoral, and popliteal arteries are widely  patent. Normal trifurcation with three-vessel runoff through the foot.  Dorsalis pedis and plantar arteries are opacified.     Nonvascular findings:      No arterial enhancing liver lesion. Gallbladder appears normal. No  biliary ductal dilatation. Pancreas, spleen, and right adrenal gland  appear normal. Indeterminate 1.3 cm left adrenal gland nodule. No solid  renal mass. No urolithiasis or hydronephrosis. No focal urinary bladder  wall abnormality. Prostate is unremarkable. No abnormal bowel distention  or adjacent inflammation. No ascites or free pelvic fluid. No pelvic  mass or organized pelvic collections. Small left  inguinal fat-containing  hernia. No enlarged abdominal or pelvic lymph nodes. No suspicious  osseous finding.          Impression:          Large nonocclusive thrombus at the aortic bifurcation extending into  both common iliac artery origins (coronal image 59, series 7). This  could represent source of reported distal emboli. The BROOKE is not  definitely opacified, which may be a chronic finding. Otherwise, no  major vascular occlusion is identified. Bilateral three-vessel runoff is  maintained.     Indeterminate 1.3 cm left adrenal gland nodule. This likely represents  an adenoma but if clinically indicated, further evaluation could be  obtained with CT/MR adrenal protocol.     -- Findings of thrombus discussed discussed with current provider.  Patient is to be evaluated by vascular surgery.  This report was finalized on 08/17/2019 08:12 by Dr. Leo Mercedes MD.    CT Angiogram Chest With & Without Contrast [[id number]] Collected:  08/17/19 0744     Updated:  08/17/19 0752    Narrative:       Exam: CT ANGIOGRAM CHEST W WO CONTRAST-     Indication: search of clot; PAD     Comparison: None available.     DOSE LENGTH PRODUCT: 225 mGy cm. Automated exposure control was also  utilized to decrease patient radiation dose.     Findings:     No pulmonary embolus identified. Main pulmonary trunk is normal in  caliber. Thoracic aorta appears normal. No pericardial effusion.     The central airways are clear. No consolidation, pleural effusion, or  pneumothorax. No suspicious pulmonary nodule. No enlarged thoracic lymph  nodes. No acute finding in the upper abdomen. No suspicious osseous  finding.       Impression:          No evidence of pulmonary embolus or other acute finding.      These findings are in agreement with the critical and emergent findings  from the [facility] preliminary report.  This report was finalized on 08/17/2019 07:49 by Dr. Leo Mercedes MD.          Impression: This is a 44-year-old male who presents  with ischemic findings of the right first toe and ultimately a blue toe syndrome.  On imaging he is found to have thrombus at the aortic bifurcation with extension into the bilateral common iliac arteries.  However these remain patent.    Ischemic toe    Thrombus of aorta (CMS/HCC)    Tobacco abuse    Mixed hyperlipidemia      Plan: After thoroughly evaluating Fiona Laguerre, I believe the best course of action is to continue with further work-up.  The patient presented with blue toe syndrome with ischemic findings of the right first toe however maintained palpable pedal pulses.  Ultimately he was transferred here given that there was no vascular surgery available at [facility].  Imaging with CTA of the chest as well as the abdomen/pelvis with runoff shows thrombus at the aortic bifurcation with extension into the bilateral common iliac arteries.  He did receive a therapeutic dose of Lovenox yesterday evening at [facility] and given this finding of thrombus in the aorta I have ordered a heparin drip with bolus to be initiated this morning for therapeutic anticoagulation moving forward.  A statin has also already been started by the admitting medical team.  He is pending an echocardiogram to evaluate for a possible cardiac source of this thrombus.  The plan at this point will be to continue anticoagulation and the patient will ultimately need to go to the OR likely on Monday for angiogram and aortoiliac stent placement for exclusion of this thrombus to prevent any further embolization in the future.  He will also likely need a hypercoagulable work-up that can be done moving forward to evaluate for any hypercoagulable disorders as a possible cause for development of this aortic thrombus.  For now we can continue to monitor the right great toe for ultimate demarcation and depending on how it does he may need to be seen by podiatry at some point next week but nothing for now.  Otherwise we  also discussed the importance of smoking cessation as this is also significant risk factor in overall arterial pathology.  Nicotine patch was also ordered by the admitting medical team overnight to be placed this morning.  Otherwise I will continue to follow him closely and continue making arrangements for the OR.  Thank you for allowing me to see Fiona Laguerre in consult. Please feel free to reach out with any questions or concerns.    Karson Rivera MD  Vascular Surgery  [phone]

## 2019-08-18 LAB
ALBUMIN SERPL-MCNC: 4.2 G/DL (ref 3.5–5)
ALBUMIN/GLOB SERPL: 1.3 G/DL (ref 1.1–2.5)
ALP SERPL-CCNC: 62 U/L (ref 24–120)
ALT SERPL W P-5'-P-CCNC: 42 U/L (ref 0–54)
ANION GAP SERPL CALCULATED.3IONS-SCNC: 7 MMOL/L (ref 4–13)
APTT PPP: 102.3 SECONDS (ref 24.1–35)
APTT PPP: 112 SECONDS (ref 24.1–35)
APTT PPP: 63.8 SECONDS (ref 24.1–35)
APTT PPP: 71.8 SECONDS (ref 24.1–35)
AST SERPL-CCNC: 32 U/L (ref 7–45)
AT III PPP CHRO-ACNC: 81 % (ref 84–123)
BILIRUB SERPL-MCNC: 0.4 MG/DL (ref 0.1–1)
BUN BLD-MCNC: 11 MG/DL (ref 5–21)
BUN/CREAT SERPL: 11 (ref 7–25)
CALCIUM SPEC-SCNC: 9.1 MG/DL (ref 8.4–10.4)
CHLORIDE SERPL-SCNC: 104 MMOL/L (ref 98–110)
CO2 SERPL-SCNC: 28 MMOL/L (ref 24–31)
CREAT BLD-MCNC: 1 MG/DL (ref 0.5–1.4)
DEPRECATED RDW RBC AUTO: 42.4 FL (ref 37–54)
ERYTHROCYTE [DISTWIDTH] IN BLOOD BY AUTOMATED COUNT: 13 % (ref 12.3–15.4)
GFR SERPL CREATININE-BSD FRML MDRD: 81 ML/MIN/1.73
GLOBULIN UR ELPH-MCNC: 3.3 GM/DL
GLUCOSE BLD-MCNC: 96 MG/DL (ref 70–100)
HCT VFR BLD AUTO: 46.9 % (ref 37.5–51)
HGB BLD-MCNC: 15.7 G/DL (ref 13–17.7)
MCH RBC QN AUTO: 30.3 PG (ref 26.6–33)
MCHC RBC AUTO-ENTMCNC: 33.5 G/DL (ref 31.5–35.7)
MCV RBC AUTO: 90.4 FL (ref 79–97)
PLATELET # BLD AUTO: 243 10*3/MM3 (ref 140–450)
PMV BLD AUTO: 9.4 FL (ref 6–12)
POTASSIUM BLD-SCNC: 4.9 MMOL/L (ref 3.5–5.3)
PROT SERPL-MCNC: 7.5 G/DL (ref 6.3–8.7)
RBC # BLD AUTO: 5.19 10*6/MM3 (ref 4.14–5.8)
SODIUM BLD-SCNC: 139 MMOL/L (ref 135–145)
WBC NRBC COR # BLD: 8.1 10*3/MM3 (ref 3.4–10.8)

## 2019-08-18 PROCEDURE — 85220 BLOOC CLOT FACTOR V TEST: CPT | Performed by: INTERNAL MEDICINE

## 2019-08-18 PROCEDURE — 85300 ANTITHROMBIN III ACTIVITY: CPT | Performed by: INTERNAL MEDICINE

## 2019-08-18 PROCEDURE — 85302 CLOT INHIBIT PROT C ANTIGEN: CPT | Performed by: INTERNAL MEDICINE

## 2019-08-18 PROCEDURE — 99232 SBSQ HOSP IP/OBS MODERATE 35: CPT | Performed by: SURGERY

## 2019-08-18 PROCEDURE — 85730 THROMBOPLASTIN TIME PARTIAL: CPT | Performed by: SURGERY

## 2019-08-18 PROCEDURE — 85670 THROMBIN TIME PLASMA: CPT | Performed by: INTERNAL MEDICINE

## 2019-08-18 PROCEDURE — 83090 ASSAY OF HOMOCYSTEINE: CPT | Performed by: INTERNAL MEDICINE

## 2019-08-18 PROCEDURE — 85306 CLOT INHIBIT PROT S FREE: CPT | Performed by: INTERNAL MEDICINE

## 2019-08-18 PROCEDURE — 85027 COMPLETE CBC AUTOMATED: CPT | Performed by: INTERNAL MEDICINE

## 2019-08-18 PROCEDURE — 81240 F2 GENE: CPT | Performed by: INTERNAL MEDICINE

## 2019-08-18 PROCEDURE — 80053 COMPREHEN METABOLIC PANEL: CPT | Performed by: INTERNAL MEDICINE

## 2019-08-18 PROCEDURE — 85705 THROMBOPLASTIN INHIBITION: CPT | Performed by: INTERNAL MEDICINE

## 2019-08-18 PROCEDURE — 81241 F5 GENE: CPT | Performed by: INTERNAL MEDICINE

## 2019-08-18 PROCEDURE — 86147 CARDIOLIPIN ANTIBODY EA IG: CPT | Performed by: INTERNAL MEDICINE

## 2019-08-18 PROCEDURE — 25010000002 HEPARIN (PORCINE) PER 1000 UNITS: Performed by: SURGERY

## 2019-08-18 PROCEDURE — 85732 THROMBOPLASTIN TIME PARTIAL: CPT | Performed by: INTERNAL MEDICINE

## 2019-08-18 PROCEDURE — 85305 CLOT INHIBIT PROT S TOTAL: CPT | Performed by: INTERNAL MEDICINE

## 2019-08-18 PROCEDURE — 85613 RUSSELL VIPER VENOM DILUTED: CPT | Performed by: INTERNAL MEDICINE

## 2019-08-18 PROCEDURE — 85303 CLOT INHIBIT PROT C ACTIVITY: CPT | Performed by: INTERNAL MEDICINE

## 2019-08-18 RX ADMIN — NICOTINE 1 PATCH: 21 PATCH, EXTENDED RELEASE TRANSDERMAL at 08:35

## 2019-08-18 RX ADMIN — HEPARIN SODIUM 1300 UNITS/HR: 10000 INJECTION, SOLUTION INTRAVENOUS at 23:39

## 2019-08-18 RX ADMIN — HEPARIN SODIUM 1300 UNITS/HR: 10000 INJECTION, SOLUTION INTRAVENOUS at 03:33

## 2019-08-18 RX ADMIN — SODIUM CHLORIDE, PRESERVATIVE FREE 3 ML: 5 INJECTION INTRAVENOUS at 20:54

## 2019-08-18 RX ADMIN — HEPARIN SODIUM 3340 UNITS: 1000 INJECTION, SOLUTION INTRAVENOUS; SUBCUTANEOUS at 15:29

## 2019-08-18 RX ADMIN — DESMOPRESSIN ACETATE 40 MG: 0.2 TABLET ORAL at 20:54

## 2019-08-18 NOTE — PROGRESS NOTES
[facility]  INPATIENT PROGRESS NOTE    Patient Name: Fiona Laguerre  Date of Admission: 8/16/2019  Today's Date: 08/18/19  Length of Stay: 1  Primary Care Physician: Manas Zhu MD    Subjective   Chief Complaint: Aortic thrombus.   HPI   He seems to be doing fairly well today.  He does complain of pain in the very distal tip of his right great toe.  This has been marked by the nursing staff to follow the extent of his ischemia.  He is anxiously awaiting intervention with Dr. Rivera tomorrow.  He is on the heparin drip with PTTs checked every 6 hours.    His sister called him and wants to know what time the procedure will be tomorrow.  I told him that I am not sure.  Vascular has not been by today.    Review of Systems   All pertinent negatives and positives are as above. All other systems have been reviewed and are negative unless otherwise stated.     Objective    Temp:  [97.4 °F (36.3 °C)-98.3 °F (36.8 °C)] 97.8 °F (36.6 °C)  Heart Rate:  [64-83] 75  Resp:  [16] 16  BP: (106-121)/(71-82) 115/71  Physical Exam  Constitutional: He is oriented to person, place, and time. He appears well-developed and well-nourished.   Up in bed.  No distress.  Watching television ([employer]).  No family present. Discussed with his nurse, Joy.   Head: Normocephalic and atraumatic.   Eyes: Conjunctivae and EOM are normal. Pupils are equal, round, and reactive to light.   Neck: Neck supple. No JVD present.   Cardiovascular: Normal rate, regular rhythm, normal heart sounds and intact distal pulses. Exam reveals no gallop and no friction rub. No murmur heard.  Pulmonary/Chest: Effort normal and breath sounds normal. No respiratory distress. He has no wheezes. He has no rales. He exhibits no tenderness.   Abdominal: Soft. Bowel sounds are normal. He exhibits no distension. There is no tenderness. There is no rebound and no guarding.   Musculoskeletal: Normal  range of motion. He exhibits no edema, tenderness or deformity.   Neurological: He is alert and oriented to person, place, and time. He displays normal reflexes. No cranial nerve deficit. He exhibits normal muscle tone.   Skin: Skin is warm and dry. No rash noted. Right great toe is dusky with a purplish discoloration.  Extremely poor capillary refill of the nailbed.  See new photo below.   Psychiatric: He has a normal mood and affect. His behavior is normal. Judgment and thought content normal.       Results Review:  I have reviewed the labs, radiology results, and diagnostic studies.    Laboratory Data:   Results from last 7 days   Lab Units 08/18/19  0224 08/17/19  0442   WBC 10*3/mm3 8.10 10.07   HEMOGLOBIN g/dL 15.7 16.9   HEMATOCRIT % 46.9 49.5   PLATELETS 10*3/mm3 243 285     Results from last 7 days   Lab Units 08/18/19 0224 08/17/19  0442   SODIUM mmol/L 139 139   POTASSIUM mmol/L 4.9 4.7   CHLORIDE mmol/L 104 102   CO2 mmol/L 28.0 30.0   BUN mg/dL 11 6   CREATININE mg/dL 1.00 1.02   CALCIUM mg/dL 9.1 9.6   BILIRUBIN mg/dL 0.4  --    ALK PHOS U/L 62  --    ALT (SGPT) U/L 42  --    AST (SGOT) U/L 32  --    GLUCOSE mg/dL 96 91     Lab Results   Lab Value Date/Time    PTT 71.8 (H) 08/18/2019 0926    .0 (C) 08/18/2019 0224    PTT 63.6 (H) 08/17/2019 2021    .6 (C) 08/17/2019 1414    PTT 32.1 08/17/2019 0733     Results for orders placed during the hospital encounter of 08/16/19   Adult Transthoracic Echo Complete W/ Cont if Necessary Per Protocol    Narrative · Estimated EF = 60%.  · Left ventricular systolic function is normal.  · No evidence of pulmonary hypertension is present.        I have reviewed the patient's current medications.     Assessment/Plan     Active Hospital Problems    Diagnosis   • **Ischemic toe   • Thrombus of aorta (CMS/HCC)   • Tobacco abuse   • Mixed hyperlipidemia     Plan:   The patient was admitted on 8/16 by Dr. Martin.  He was transferred to our facility from  [facility] in [place], [place] after presenting there with pain and discoloration of his right great toe.  They were interested in vascular surgery consultation.     He underwent CTA of the chest, abdomen, and pelvis with runoff on the night of presentation.  This ultimately showed a large, nonocclusive thrombus of the aortic bifurcation extending into both common iliac artery origins.  The patient has embolized this to his right great toe causing his current condition.     The patient has been evaluated by Dr. Rivera.  I have discussed the case with him.  The patient has been placed on a heparin drip.  He plans to take the patient to the operative suite tomorrow for angiogram and aortoiliac stent placement.  The patient will eventually need to be placed on oral anticoagulation.     Echocardiogram without any significant abnormalities. He has been NSR on telemetry thus far.      Hypercoagulable panel sent today.     He has mixed hyperlipidemia and has been started on a statin.     His hemoglobin A1c is normal.      He has not displayed any hypertension thus far.     Nicotine patch has been offered and he desperately needs to stop smoking.     Discharge Planning: Indeterminate at present.  He recently signed up for insurance.  He was told that Dr. Manas Zhu in [place] would be his primary care doctor.  He has not made an appointment with him yet.    Jae Vasquez,    08/18/19   11:06 AM

## 2019-08-18 NOTE — PLAN OF CARE
Problem: Patient Care Overview  Goal: Plan of Care Review  Outcome: Ongoing (interventions implemented as appropriate)   08/18/19 9315   Coping/Psychosocial   Plan of Care Reviewed With patient   Plan of Care Review   Progress no change   OTHER   Outcome Summary Right DP and PT palpable. Blueness continues. Denies numbness/tingling. Heparin drip continues. VSS.      Goal: Individualization and Mutuality  Outcome: Ongoing (interventions implemented as appropriate)    Goal: Discharge Needs Assessment  Outcome: Ongoing (interventions implemented as appropriate)      Problem: Tissue Perfusion, Ineffective Peripheral (Adult)  Goal: Identify Related Risk Factors and Signs and Symptoms  Outcome: Ongoing (interventions implemented as appropriate)    Goal: Adequate Tissue Perfusion  Outcome: Ongoing (interventions implemented as appropriate)

## 2019-08-18 NOTE — PLAN OF CARE
Problem: Patient Care Overview  Goal: Plan of Care Review  Outcome: Ongoing (interventions implemented as appropriate)   08/18/19 0324   Coping/Psychosocial   Plan of Care Reviewed With patient   Plan of Care Review   Progress no change   OTHER   Outcome Summary Pt has no c/o pain. Up ad lauri, ambulates often to BR, voiding w/out difficulty. Heparin drip infusing. Right great toe remains bluish, pt denies numbness or tingling. Tele, normal sinus. IVF as ordered. VSS, cont to monitor.     Goal: Individualization and Mutuality  Outcome: Ongoing (interventions implemented as appropriate)    Goal: Discharge Needs Assessment  Outcome: Ongoing (interventions implemented as appropriate)    Goal: Interprofessional Rounds/Family Conf  Outcome: Ongoing (interventions implemented as appropriate)      Problem: Pain, Acute (Adult)  Goal: Identify Related Risk Factors and Signs and Symptoms  Outcome: Outcome(s) achieved Date Met: 08/18/19    Goal: Acceptable Pain Control/Comfort Level  Outcome: Outcome(s) achieved Date Met: 08/18/19      Problem: Tissue Perfusion, Ineffective Peripheral (Adult)  Goal: Identify Related Risk Factors and Signs and Symptoms  Outcome: Ongoing (interventions implemented as appropriate)    Goal: Adequate Tissue Perfusion  Outcome: Ongoing (interventions implemented as appropriate)

## 2019-08-18 NOTE — PROGRESS NOTES
LOS: 1 day   Patient Care Team:  Manas Zhu MD as PCP - General (Family Medicine)    Chief Complaint: Ischemia of right great toe, aortic thrombus    Subjective     Patient seen and examined at bedside.  No acute events overnight.  Complains of intermittent mild pain to the right great toe but otherwise feels well.  Continues on heparin drip with therapeutic PTT.  No other changes.  Echo was done yesterday and shows normal EF with no other acute findings.    Objective     Vital Signs  Temp:  [97.4 °F (36.3 °C)-98.3 °F (36.8 °C)] 98 °F (36.7 °C)  Heart Rate:  [64-83] 75  Resp:  [16] 16  BP: (105-121)/(69-82) 105/69    Physical Exam   Constitutional: He is oriented to person, place, and time. He appears well-developed and well-nourished.   HENT:   Head: Normocephalic and atraumatic.   Eyes: EOM are normal. Pupils are equal, round, and reactive to light.   Neck: Normal range of motion. Neck supple. No JVD present.   Cardiovascular: Normal rate, regular rhythm, intact distal pulses and normal pulses.   Pulses:       Carotid pulses are 2+ on the right side, and 2+ on the left side.       Radial pulses are 2+ on the right side, and 2+ on the left side.        Femoral pulses are 2+ on the right side, and 2+ on the left side.       Popliteal pulses are 2+ on the right side, and 2+ on the left side.        Dorsalis pedis pulses are 2+ on the right side, and 2+ on the left side.        Posterior tibial pulses are 2+ on the right side, and 2+ on the left side.   He has unchanged ischemic changes of the right great toe consistent with blue toe syndrome.  The foot has been marked by nursing and appears unchanged to me from my exam yesterday.   Pulmonary/Chest: Effort normal. No respiratory distress.   Abdominal: Soft. He exhibits no distension and no mass. There is no tenderness.   Musculoskeletal: Normal range of motion. He exhibits tenderness (Mild tenderness to palpation of the right great toe.). He exhibits no  edema or deformity.   Neurological: He is alert and oriented to person, place, and time.   Skin: Skin is warm and dry. Capillary refill is severely diminished to the right great toe.  However to all other digits of the right foot as well as all digits of the left foot capillary refill is less than 2 seconds in both feet are warm and otherwise well perfused appearing..   Psychiatric: He has a normal mood and affect. His behavior is normal. Judgment and thought content normal.   Vitals reviewed.      Laboratory Data:   Results from last 7 days   Lab Units 08/18/19 0224 08/17/19 0442   WBC 10*3/mm3 8.10 10.07   HEMOGLOBIN g/dL 15.7 16.9   HEMATOCRIT % 46.9 49.5   PLATELETS 10*3/mm3 243 285       Results from last 7 days   Lab Units 08/18/19 0224 08/17/19 0442   SODIUM mmol/L 139 139   POTASSIUM mmol/L 4.9 4.7   CHLORIDE mmol/L 104 102   CO2 mmol/L 28.0 30.0   BUN mg/dL 11 6   CREATININE mg/dL 1.00 1.02   CALCIUM mg/dL 9.1 9.6   BILIRUBIN mg/dL 0.4  --    ALK PHOS U/L 62  --    ALT (SGPT) U/L 42  --    AST (SGOT) U/L 32  --    GLUCOSE mg/dL 96 91     Results from last 7 days   Lab Units 08/18/19  0926 08/18/19 0224 08/17/19 2021 08/17/19  0733   INR   --   --   --   --  0.92   APTT seconds 71.8* 112.0* 63.6*   < > 32.1    < > = values in this interval not displayed.            Medication Review: Reviewed, no changes.  Remains on therapeutic heparin drip.    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  At this point plan will be for the OR on Tuesday morning as the appropriate/optimal stent graft will not be available to be delivered until that time.  As such for now patient will continue on heparin drip and will continue to monitor the right first toe for  demarcation.  Hypercoagulable work-up has been sent and is pending.  Echo was done and reviewed as above and shows normal EF with no other acute findings.  He continues to be in sinus rhythm on telemetry.  This was all discussed at length with the patient this morning at the bedside and he is aware and agrees.  I will continue to follow him closely.    Plan for disposition: Continued inpatient care pending OR    Karson Rivera MD  Vascular Surgery  [phone]  08/18/19  11:51 AM

## 2019-08-19 LAB
APTT PPP: 109.1 SECONDS (ref 24.1–35)
APTT PPP: 66 SECONDS (ref 24.1–35)
APTT PPP: 71.1 SECONDS (ref 24.1–35)
APTT PPP: 78.5 SECONDS (ref 24.1–35)
DEPRECATED RDW RBC AUTO: 41.8 FL (ref 37–54)
ERYTHROCYTE [DISTWIDTH] IN BLOOD BY AUTOMATED COUNT: 12.8 % (ref 12.3–15.4)
HCT VFR BLD AUTO: 46.2 % (ref 37.5–51)
HGB BLD-MCNC: 15.6 G/DL (ref 13–17.7)
MCH RBC QN AUTO: 30.2 PG (ref 26.6–33)
MCHC RBC AUTO-ENTMCNC: 33.8 G/DL (ref 31.5–35.7)
MCV RBC AUTO: 89.4 FL (ref 79–97)
PLATELET # BLD AUTO: 270 10*3/MM3 (ref 140–450)
PMV BLD AUTO: 9.2 FL (ref 6–12)
RBC # BLD AUTO: 5.17 10*6/MM3 (ref 4.14–5.8)
WBC NRBC COR # BLD: 8.43 10*3/MM3 (ref 3.4–10.8)

## 2019-08-19 PROCEDURE — 25010000002 HEPARIN (PORCINE) PER 1000 UNITS: Performed by: SURGERY

## 2019-08-19 PROCEDURE — 85730 THROMBOPLASTIN TIME PARTIAL: CPT | Performed by: SURGERY

## 2019-08-19 PROCEDURE — 85027 COMPLETE CBC AUTOMATED: CPT | Performed by: INTERNAL MEDICINE

## 2019-08-19 PROCEDURE — 99232 SBSQ HOSP IP/OBS MODERATE 35: CPT | Performed by: SURGERY

## 2019-08-19 RX ADMIN — NICOTINE 1 PATCH: 21 PATCH, EXTENDED RELEASE TRANSDERMAL at 08:59

## 2019-08-19 RX ADMIN — HEPARIN SODIUM 1400 UNITS/HR: 10000 INJECTION, SOLUTION INTRAVENOUS at 18:18

## 2019-08-19 RX ADMIN — DESMOPRESSIN ACETATE 40 MG: 0.2 TABLET ORAL at 20:42

## 2019-08-19 RX ADMIN — HEPARIN SODIUM 3340 UNITS: 1000 INJECTION, SOLUTION INTRAVENOUS; SUBCUTANEOUS at 16:08

## 2019-08-19 NOTE — PROGRESS NOTES
LOS: 2 days   Patient Care Team:  Manas Zhu MD as PCP - General (Family Medicine)    Chief Complaint: Ischemia of right great toe, aortic thrombus    Subjective     Patient seen and examined at bedside.  No acute events overnight.  Complains of intermittent mild pain to the right great toe but otherwise feels well.  Continues on heparin drip with therapeutic PTT.  No other changes.  For OR in a.m.    Objective     Vital Signs  Temp:  [97.5 °F (36.4 °C)-98 °F (36.7 °C)] 97.8 °F (36.6 °C)  Heart Rate:  [72-84] 81  Resp:  [16] 16  BP: (110-130)/(69-78) 117/71    Physical Exam   Constitutional: He is oriented to person, place, and time. He appears well-developed and well-nourished.   HENT:   Head: Normocephalic and atraumatic.   Eyes: EOM are normal. Pupils are equal, round, and reactive to light.   Neck: Normal range of motion. Neck supple. No JVD present.   Cardiovascular: Normal rate, regular rhythm, intact distal pulses and normal pulses.   Pulses:       Carotid pulses are 2+ on the right side, and 2+ on the left side.       Radial pulses are 2+ on the right side, and 2+ on the left side.        Femoral pulses are 2+ on the right side, and 2+ on the left side.       Popliteal pulses are 2+ on the right side, and 2+ on the left side.        Dorsalis pedis pulses are 2+ on the right side, and 2+ on the left side.        Posterior tibial pulses are 2+ on the right side, and 2+ on the left side.   He has unchanged ischemic changes of the right great toe consistent with blue toe syndrome.  The foot has been marked by nursing and appears unchanged to me from my exam yesterday.   Pulmonary/Chest: Effort normal. No respiratory distress.   Abdominal: Soft. He exhibits no distension and no mass. There is no tenderness.   Musculoskeletal: Normal range of motion. He exhibits tenderness (Mild tenderness to palpation of the right great toe.). He exhibits no edema or deformity.   Neurological: He is alert and  oriented to person, place, and time.   Skin: Skin is warm and dry. Capillary refill is severely diminished to the right great toe.  However to all other digits of the right foot as well as all digits of the left foot capillary refill is less than 2 seconds in both feet are warm and otherwise well perfused appearing..   Psychiatric: He has a normal mood and affect. His behavior is normal. Judgment and thought content normal.   Vitals reviewed.      Laboratory Data:   Results from last 7 days   Lab Units 08/19/19  0209 08/18/19  0224 08/17/19  0442   WBC 10*3/mm3 8.43 8.10 10.07   HEMOGLOBIN g/dL 15.6 15.7 16.9   HEMATOCRIT % 46.2 46.9 49.5   PLATELETS 10*3/mm3 270 243 285       Results from last 7 days   Lab Units 08/18/19  0224 08/17/19  0442   SODIUM mmol/L 139 139   POTASSIUM mmol/L 4.9 4.7   CHLORIDE mmol/L 104 102   CO2 mmol/L 28.0 30.0   BUN mg/dL 11 6   CREATININE mg/dL 1.00 1.02   CALCIUM mg/dL 9.1 9.6   BILIRUBIN mg/dL 0.4  --    ALK PHOS U/L 62  --    ALT (SGPT) U/L 42  --    AST (SGOT) U/L 32  --    GLUCOSE mg/dL 96 91     Results from last 7 days   Lab Units 08/19/19  1438 08/19/19  0840 08/19/19  0209  08/17/19  0733   INR   --   --   --   --  0.92   APTT seconds 66.0* 71.1* 78.5*   < > 32.1    < > = values in this interval not displayed.            Medication Review: Reviewed, no changes.  Remains on therapeutic heparin drip.    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  Plan is for OR in the morning with angiogram and placement of aortoiliac stent for exclusion of the aortic bifurcation thrombus. Risks of angiogram were discussed.  These include, but are not limited to, bleeding, infection, vessel damage, nerve damage, embolus, and loss of limb.  The  patient understands these risks and wishes to proceed with procedure. He will be NPO after midnight. Will plan to hold heparin gtt at 4am for the OR.    Plan for disposition: Continued inpatient care pending OR    Karson Rivera MD  Vascular Surgery  [phone]  08/19/19  3:47 PM

## 2019-08-19 NOTE — PAYOR COMM NOTE
"8/19/19 [facility] [phone]  [phone]    ADMITTED TO INPATIENT ON 8/16/19    PENDING AUTH  [id number]      Fiona Laguerre (44 y.o. Male)     Date of Birth Social Security Number Address Home Phone MRN    1975  [address] [place] [id number] [phone] [id number]    Anabaptist Marital Status          Other Single       Admission Date Admission Type Admitting Provider Attending Provider Department, Room/Bed    8/16/19 Urgent Bela Campos MD Doman, Florence F, MD [facility] 3C, 386/1    Discharge Date Discharge Disposition Discharge Destination                       Attending Provider:  Bela Campos MD    Allergies:  No Known Allergies    Isolation:  None   Infection:  None   Code Status:  CPR    Ht:  177.8 cm (70\")   Wt:  83.6 kg (184 lb 4.9 oz)    Admission Cmt:  None   Principal Problem:  Ischemic toe [I99.8]                 Active Insurance as of 8/16/2019     Primary Coverage     Payor Plan Insurance Group Employer/Plan Group    ANTHEM MEDICAID ANTHEM MEDICAID [id number]     Payor Plan Address Payor Plan Phone Number Payor Plan Fax Number Effective Dates    [address] [address] [phone]  8/15/2019 - None Entered    [facility] [id number]       Subscriber Name Subscriber Birth Date Member ID       FIONA LAGUERRE 1975 [id number]                 Emergency Contacts      (Rel.) Home Phone Work Phone Mobile Phone    Tiff Douglas (Mother) [phone] -- --        CT Angio Abdominal Aorta Bilateral Iliofem Runoff With & Without Contrast [[id number]] (Order [id number])   Order   Status: Final result   Patient Location     Patient Class Location   Inpatient  PAD 3C, 386, 1     [phone]      Study Notes        Ifrah Singleton on 8/17/2019 12:46 AM   blue toe syndrome (Rt big toe), PAD, search for clot.  (automatic shut off of iv contrast occurred due to reaching iv pressure point; delay in starting scan)  Total dlp 461.8 mGycm "      Appointment Information     PACS Images      Radiology Images   Study Result     Exam: CT ANGIO ABDOMINAL AORTA BILAT ILIOFEM RUNOFF W WO CONTRAST-     Indication: Peripheral arterial disease, blue toe syndrome     Comparison: None available.     DOSE LENGTH PRODUCT: 461.8 mGy cm. Automated exposure control was also  utilized to decrease patient radiation dose.     Findings:     Vascular findings:      The abdominal aorta is normal in caliber. The celiac trunk and SMA are  widely patent. No definite opacification of the BROOKE. Renal arteries are  widely patent. There is a nonocclusive thrombus at the aortic  bifurcation extending into both proximal common iliac arteries. This is  best demonstrated on coronal image 60 of series 7. Both common iliac  arteries maintain patency.     On the right, the right internal iliac, external iliac, common femoral,  deep femoral, superficial femoral, impossible to artery are widely  patent. Normal trifurcation with three-vessel runoff through the foot.  Dorsalis pedis and plantar artery are patent.     On the left, the left internal iliac, external iliac, common femoral,  deep femoral, superficial femoral, and popliteal arteries are widely  patent. Normal trifurcation with three-vessel runoff through the foot.  Dorsalis pedis and plantar arteries are opacified.     Nonvascular findings:      No arterial enhancing liver lesion. Gallbladder appears normal. No  biliary ductal dilatation. Pancreas, spleen, and right adrenal gland  appear normal. Indeterminate 1.3 cm left adrenal gland nodule. No solid  renal mass. No urolithiasis or hydronephrosis. No focal urinary bladder  wall abnormality. Prostate is unremarkable. No abnormal bowel distention  or adjacent inflammation. No ascites or free pelvic fluid. No pelvic  mass or organized pelvic collections. Small left inguinal fat-containing  hernia. No enlarged abdominal or pelvic lymph nodes. No suspicious  osseous finding.         IMPRESSION:     Large nonocclusive thrombus at the aortic bifurcation extending into  both common iliac artery origins (coronal image 59, series 7). This  could represent source of reported distal emboli. The BROOKE is not  definitely opacified, which may be a chronic finding. Otherwise, no  major vascular occlusion is identified. Bilateral three-vessel runoff is  maintained.     Indeterminate 1.3 cm left adrenal gland nodule. This likely represents  an adenoma but if clinically indicated, further evaluation could be  obtained with CT/MR adrenal protocol.     -- Findings of thrombus discussed discussed with current provider.  Patient is to be evaluated by vascular surgery.  This report was finalized on 08/17/2019 08:12 by Dr. Leo Mercedes MD.   Imaging     CT Angio Abdominal Aorta Bilateral Iliofem Runoff With & Without Contrast (Order: [id number]) - 8/16/2019   Reprint Order Requisition     CT Angio Abdominal Aorta Bilateral Iliofem Runoff With & Without Contrast (Order #[id number]) on 8/16/19          History & Physical      Jameson Martin MD at 8/16/2019 10:29 PM              [facility] Medicine Services  HISTORY AND PHYSICAL    Date of Admission: 8/16/2019  Primary Care Physician: Provider, No Known    Subjective     Chief Complaint: transferred from outside hospital for vascular surgeon evaluation due to concern for ischemic toe    History of Present Illness  44-year old man transferred from [facility] for further evaluation and management of concern for ischemic right big toe.  Symptoms of pain had been ongoing for the past couple of weeks.  Patient reportedly noted pinkish discoloration of right big toe which evolved into red and blue and purple dusky discoloration.  He has no significant past medical history.  He only smokes a pack to 1-1/2 pack/day since teenager.  He is not aware of his cholesterol.  I am told by transferring physician that  "there was T wave inversions in V4 to V6 and has poor R wave progression on his chest leads does the requested for hospitalist service instead of admitting to vascular surgery.  I have confirmed the EKG and has no prior EKG for comparison.  He denies any chest pain, shortness of breath, palpitation, dizziness,.  Troponin is negative from transferring facility.  He received Lovenox treatment dose prior to transfer.  I discussed case with Dr. Rivera who recommends CT angiogram of the chest, CT of the abdominal aorta bilateral iliofemoral runoff with and without contrast and an echocardiogram.   No further anticoagulation needed at this time until seen by vascular surgeon as discussed.    ALBERTO on the left is 0.96 while on the right is 1.01.  DPA is 82, normal until blood gets to the right toe and reportedly has flat line.  Review of Systems   No cp, no sob  No palpitation  No dizziness  No issue on BM, urination  No headache  No fever  No hx of syncope  Otherwise complete ROS reviewed and negative except as mentioned in the HPI.    Past Medical History: no endorsed medical problem  Past Surgical History: none  Social History:  1 pack to 1 1/2 pack since teenager. Seldom drinks alcohol; last drink 1 month ago  Denies any recreational drug use;     Family History:  Mom had bell's palsy; dad passed on with \"three different kidn of cancers\".  Said he  Had brain cancer  Allergies:   NKDA  Medications: None  Prior to Admission medications    Not on File     Objective     Vital Signs: /74 (BP Location: Left arm, Patient Position: Lying)   Pulse 66   Temp 98 °F (36.7 °C) (Oral)   Resp 16   Ht 177.8 cm (70\")   Wt 83.6 kg (184 lb 4.9 oz)   SpO2 96%   BMI 26.44 kg/m²    Physical Exam   Constitutional: He is oriented to person, place, and time. He appears well-developed. No distress.   HENT:   Head: Normocephalic and atraumatic.   Right Ear: External ear normal.   Left Ear: External ear normal.   Nose: Nose normal. "   Mouth/Throat: Oropharynx is clear and moist. No oropharyngeal exudate.   Eyes: Conjunctivae and EOM are normal. Pupils are equal, round, and reactive to light. Right eye exhibits no discharge. Left eye exhibits no discharge. No scleral icterus.   Neck: Normal range of motion. Neck supple. No JVD present. No tracheal deviation present. No thyromegaly present.   No carotid bruit   Cardiovascular: Normal rate, regular rhythm and normal heart sounds. Exam reveals no friction rub.   No murmur heard.  +1 pulses of the posterior tibialis bilateral and dorsalis pedis on left foot; diminished on the right Acharya pedis.   Pulmonary/Chest: Effort normal and breath sounds normal. No stridor. No respiratory distress. He has no wheezes. He has no rales.   Abdominal: Soft. Bowel sounds are normal. He exhibits no distension and no mass. There is no tenderness. There is no rebound and no guarding.   Musculoskeletal: Normal range of motion. He exhibits no tenderness or deformity.   Purplish discoloration of the plantar surface of right big toe with normal extension versus unkempt foot same discoloration.   Neurological: He is alert and oriented to person, place, and time. He displays normal reflexes. No cranial nerve deficit. He exhibits normal muscle tone. Coordination normal.   Skin: Skin is warm and dry. Capillary refill takes less than 2 seconds. He is not diaphoretic.   Colder right big toe compared to other foot. No gross tenderness or differential warmth   Psychiatric: He has a normal mood and affect. His behavior is normal. Judgment and thought content normal.   Vitals reviewed.        Results Reviewed:  Ankle-brachial index as reported above  White count was 10.5, hemoglobin 16.8, hematocrit 51, platelet of 287  CRP is 10.3  PT is 10.2, INR 1  Sodium 137, potassium 4.5, chloride 100, CO2 30, glucose 94, creatinine 1.3, total protein 8.2, albumin 3.9, calcium 9.1, bilirubin total is 0.3, AST 21, ALT 41, alkaline phosphatase  79    Lab Results (last 24 hours)     ** No results found for the last 24 hours. **        Imaging Results (last 24 hours)     ** No results found for the last 24 hours. **        I have personally reviewed and interpreted the radiology studies and ECG obtained at time of admission.     Assessment / Plan     Assessment:   There are no active hospital problems to display for this patient.    Blue toe syndrome per Dr. Rivera   ? Ischemic right big toe  abnormal ekg T wve inversion anterolateral leads w/o chest pain or concern for anginal equivalent  Tobacco abuse continuous.   Plan:      Echo   cta of chest   abd aorta cta with iliofemoral run off  Check ekg and serial markers  Smoking cessation counseling  For modifiable cardiovascular risk factors  Other plan per orders  He received Lovenox already at [facility].  This should cover appropriately in the morning until seen by Vascular surgeon and make his recommendation   Code Status:  Full code     I discussed the patient's findings and my recommendations with the patient and nurse    Estimated length of stay  To be determined  Jameson Martin MD   08/16/19   10:30 PM              Electronically signed by Jameson Martin MD at 8/17/2019  6:22 AM       ICU Vital Signs     Row Name 08/19/19 1501 08/19/19 1111 08/19/19 0727 08/19/19 0717 08/19/19 0026       Vitals    Temp  97.8 °F (36.6 °C)  97.5 °F (36.4 °C)  97.5 °F (36.4 °C)  97.9 °F (36.6 °C)  98 °F (36.7 °C)    Temp src  Oral  Oral  Oral  Oral  Oral    Pulse  81  84  82  79  72    Heart Rate Source  Monitor  Monitor  Monitor  Monitor  Monitor    Resp  16  16  16  16  16    Resp Rate Source  Visual  Visual  Visual  Visual  Visual    BP  117/71  130/71  118/70  110/69  115/72    BP Location  Right arm  Right arm  Right arm  Right arm  Right arm    BP Method  Automatic  Automatic  Automatic  Automatic  Automatic    Patient Position  Lying  Lying  Lying  Lying  Sitting       Oxygen Therapy    SpO2  97 %   "97 %  --  97 %  96 %    Pulse Oximetry Type  Intermittent  Intermittent  --  Intermittent  Intermittent    Device (Oxygen Therapy)  room air  room air  --  room air  --    Row Name 08/18/19 2054 08/18/19 1950                Vitals    Temp  --  97.7 °F (36.5 °C)       Temp src  --  Oral       Pulse  --  82       Heart Rate Source  --  Monitor       Resp  --  16       Resp Rate Source  --  Visual       BP  --  116/78       BP Location  --  Right arm       BP Method  --  Automatic       Patient Position  --  Sitting          Oxygen Therapy    SpO2  --  95 %       Device (Oxygen Therapy)  room air  room air           Hospital Medications (active)       Dose Frequency Start End    acetaminophen (TYLENOL) 160 MG/5ML solution 650 mg 650 mg Every 4 Hours PRN 8/16/2019     Sig - Route: Take 20.3 mL by mouth Every 4 (Four) Hours As Needed for Mild Pain . - Oral    Linked Group 1:  \"Or\" Linked Group Details        acetaminophen (TYLENOL) suppository 650 mg 650 mg Every 4 Hours PRN 8/16/2019     Sig - Route: Insert 1 suppository into the rectum Every 4 (Four) Hours As Needed for Mild Pain . - Rectal    Linked Group 1:  \"Or\" Linked Group Details        acetaminophen (TYLENOL) tablet 650 mg 650 mg Every 4 Hours PRN 8/16/2019     Sig - Route: Take 2 tablets by mouth Every 4 (Four) Hours As Needed for Mild Pain . - Oral    Linked Group 1:  \"Or\" Linked Group Details        atorvastatin (LIPITOR) tablet 40 mg 40 mg Nightly 8/17/2019     Sig - Route: Take 1 tablet by mouth Every Night. - Oral    heparin (porcine) injection 3,340 Units 40 Units/kg × 83.6 kg As Needed 8/17/2019     Sig - Route: Infuse 3.34 mL into a venous catheter As Needed (Per Heparin Nomogram - aPTT 58-67). - Intravenous    heparin (porcine) injection 6,690 Units 80 Units/kg × 83.6 kg As Needed 8/17/2019     Sig - Route: Infuse 6.69 mL into a venous catheter As Needed (Per Heparin Nomogram - aPTT Less Than 57). - Intravenous    heparin 41062 units/250 mL (100 " units/mL) in 0.45 % NaCl infusion 1,500 Units/hr Titrated 8/17/2019     Sig - Route: Infuse 1,500 Units/hr into a venous catheter Dose Adjusted By Provider As Needed. - Intravenous    HYDROcodone-acetaminophen (NORCO)  MG per tablet 1 tablet 1 tablet Every 6 Hours PRN 8/17/2019 8/27/2019    Sig - Route: Take 1 tablet by mouth Every 6 (Six) Hours As Needed for Severe Pain . - Oral    HYDROcodone-acetaminophen (NORCO) 5-325 MG per tablet 1 tablet 1 tablet Every 6 Hours PRN 8/16/2019 8/26/2019    Sig - Route: Take 1 tablet by mouth Every 6 (Six) Hours As Needed for Moderate Pain . - Oral    nicotine (NICODERM CQ) 21 MG/24HR patch 1 patch 1 patch Every 24 Hours Scheduled 8/17/2019     Sig - Route: Place 1 patch on the skin as directed by provider Daily. - Transdermal    ondansetron (ZOFRAN) injection 4 mg 4 mg Every 6 Hours PRN 8/16/2019     Sig - Route: Infuse 2 mL into a venous catheter Every 6 (Six) Hours As Needed for Nausea or Vomiting. - Intravenous    sodium chloride 0.9 % flush 3 mL 3 mL Every 12 Hours Scheduled 8/16/2019     Sig - Route: Infuse 3 mL into a venous catheter Every 12 (Twelve) Hours. - Intravenous    sodium chloride 0.9 % flush 3-10 mL 3-10 mL As Needed 8/16/2019     Sig - Route: Infuse 3-10 mL into a venous catheter As Needed for Line Care. - Intravenous            Lab Results (last 24 hours)     Procedure Component Value Units Date/Time    aPTT [[id number]]  (Abnormal) Collected:  08/19/19 1438    Specimen:  Blood Updated:  08/19/19 1510     PTT 66.0 seconds     aPTT [[id number]]  (Abnormal) Collected:  08/19/19 0840    Specimen:  Blood Updated:  08/19/19 0924     PTT 71.1 seconds     aPTT [[id number]]  (Abnormal) Collected:  08/19/19 0209    Specimen:  Blood Updated:  08/19/19 0224     PTT 78.5 seconds     CBC (No Diff) [[id number]]  (Normal) Collected:  08/19/19 0209    Specimen:  Blood Updated:  08/19/19 0215     WBC 8.43 10*3/mm3      RBC 5.17 10*6/mm3      Hemoglobin 15.6 g/dL       Hematocrit 46.2 %      MCV 89.4 fL      MCH 30.2 pg      MCHC 33.8 g/dL      RDW 12.8 %      RDW-SD 41.8 fl      MPV 9.2 fL      Platelets 270 10*3/mm3     aPTT [[id number]]  (Abnormal) Collected:  08/18/19 2033    Specimen:  Blood Updated:  08/18/19 2118     .3 seconds            Physician Progress Notes (last 48 hours) (Notes from 8/17/2019  3:37 PM through 8/19/2019  3:37 PM)      Bela Campos MD at 8/19/2019  1:24 PM              [facility] Medicine Services  INPATIENT PROGRESS NOTE    Patient Name: Fiona Laguerre  Date of Admission: 8/16/2019  Today's Date: 08/19/19  Length of Stay: 2  Primary Care Physician: Manas Zhu MD    Subjective   Chief Complaint: No chief complaint on file.    HPI   No new changes in demarcation. Pain tolerable      Review of Systems   Negative except as mentioned above in the HPI or below in the assessment and plan       Objective    Temp:  [97.5 °F (36.4 °C)-98 °F (36.7 °C)] 97.5 °F (36.4 °C)  Heart Rate:  [68-84] 84  Resp:  [16] 16  BP: (110-130)/(68-78) 130/71    Physical Exam  Constitutional: Patient is oriented to person, place, and time and well-developed, well-nourished, and in no distress.   Head: Normocephalic and atraumatic.   Eyes: Conjunctivae and EOM are normal. Pupils are equal, round, and reactive to light.   Neck: Normal range of motion. Neck supple. No JVD present. No tracheal deviation present. No thyromegaly present.   Cardiovascular: Normal rate, regular rhythm and normal heart sounds.   Pulmonary/Chest: Effort normal and breath sounds normal. No stridor. No respiratory distress. No wheezes or crackles. Patient exhibits no chest tenderness.   Abdominal: Soft. Bowel sounds are normal. Patient exhibits no distension and no mass. There is no tenderness. There is no rebound and no guarding.   Musculoskeletal: Normal range of motion. Patient exhibits no edema or deformity.   Neurological: Patient is neurological  grossly intact.   Psychiatric: Mood and affect normal.     Results Review:  I have reviewed the labs, radiology results and diagnostic studies.    Laboratory Data:   Results from last 7 days   Lab Units 08/19/19  0209 08/18/19 0224 08/17/19  0442   WBC 10*3/mm3 8.43 8.10 10.07   HEMOGLOBIN g/dL 15.6 15.7 16.9   HEMATOCRIT % 46.2 46.9 49.5   PLATELETS 10*3/mm3 270 243 285        Results from last 7 days   Lab Units 08/18/19  0224 08/17/19  0442   SODIUM mmol/L 139 139   POTASSIUM mmol/L 4.9 4.7   CHLORIDE mmol/L 104 102   CO2 mmol/L 28.0 30.0   BUN mg/dL 11 6   CREATININE mg/dL 1.00 1.02   CALCIUM mg/dL 9.1 9.6   BILIRUBIN mg/dL 0.4  --    ALK PHOS U/L 62  --    ALT (SGPT) U/L 42  --    AST (SGOT) U/L 32  --    GLUCOSE mg/dL 96 91       Radiology Data:   Imaging Results (last 24 hours)     ** No results found for the last 24 hours. **          Current Medications:    Current Facility-Administered Medications:   •  acetaminophen (TYLENOL) tablet 650 mg, 650 mg, Oral, Q4H PRN **OR** acetaminophen (TYLENOL) 160 MG/5ML solution 650 mg, 650 mg, Oral, Q4H PRN **OR** acetaminophen (TYLENOL) suppository 650 mg, 650 mg, Rectal, Q4H PRN, Jameson Martin MD  •  atorvastatin (LIPITOR) tablet 40 mg, 40 mg, Oral, Nightly, Jae Vasquez, DO, 40 mg at 08/18/19 2054  •  heparin (porcine) injection 3,340 Units, 40 Units/kg, Intravenous, PRN, Karson Rivera MD, 3,340 Units at 08/18/19 1529  •  heparin (porcine) injection 6,690 Units, 80 Units/kg, Intravenous, PRN, Karson Rivera MD  •  heparin 03497 units/250 mL (100 units/mL) in 0.45 % NaCl infusion, 1,500 Units/hr, Intravenous, Titrated, Karson Rivera MD, Last Rate: 13 mL/hr at 08/18/19 2339, 1,300 Units/hr at 08/18/19 2339  •  HYDROcodone-acetaminophen (NORCO)  MG per tablet 1 tablet, 1 tablet, Oral, Q6H PRN, Jae Vasquez DO  •  HYDROcodone-acetaminophen (NORCO) 5-325 MG per tablet 1 tablet, 1 tablet, Oral, Q6H PRN, Jameson Martin  MD Deyanira  •  nicotine (NICODERM CQ) 21 MG/24HR patch 1 patch, 1 patch, Transdermal, Q24H, Jameson Martin MD, 1 patch at 08/19/19 0859  •  ondansetron (ZOFRAN) injection 4 mg, 4 mg, Intravenous, Q6H PRN, Jameson Martin MD  •  sodium chloride 0.9 % flush 3 mL, 3 mL, Intravenous, Q12H, Jameson Martin MD, 3 mL at 08/18/19 2054  •  sodium chloride 0.9 % flush 3-10 mL, 3-10 mL, Intravenous, PRN, Jameson Martin MD    Assessment/Plan     Active Hospital Problems    Diagnosis   • **Ischemic toe   • Thrombus of aorta (CMS/HCC)   • Tobacco abuse   • Mixed hyperlipidemia     Blue toe syndrome  Right great toe with ischemia due to blood loss from aortic thrombus  On IV heparin.  Vascular surgery pending in the morning with stents/graft  Telemetry shows normal sinus rhythm, echocardiogram unremarkable  Hypercoagulability work-up still pending  Pain controlled with oral Norco    Hyperlipidemia  Lipitor    Pre-diabetes mellitus  Hemoglobin A1c 5.9% and patient alerted about that  He states that his mother has a history of pre-diabetes mellitus also    Nicotine addiction with withdrawal  Smoking cessation advised, teen patch placed    Bela Campos MD   08/19/19   1:24 PM    TIME SPENT: 25  minutes    Electronically signed by Bela Campos MD at 8/19/2019  1:29 PM     Karson Rivera MD at 8/18/2019 11:51 AM               LOS: 1 day   Patient Care Team:  Manas Zhu MD as PCP - General (Family Medicine)    Chief Complaint: Ischemia of right great toe, aortic thrombus    Subjective     Patient seen and examined at bedside.  No acute events overnight.  Complains of intermittent mild pain to the right great toe but otherwise feels well.  Continues on heparin drip with therapeutic PTT.  No other changes.  Echo was done yesterday and shows normal EF with no other acute findings.    Objective     Vital Signs  Temp:  [97.4 °F (36.3 °C)-98.3 °F (36.8 °C)] 98 °F (36.7  °C)  Heart Rate:  [64-83] 75  Resp:  [16] 16  BP: (105-121)/(69-82) 105/69    Physical Exam   Constitutional: He is oriented to person, place, and time. He appears well-developed and well-nourished.   HENT:   Head: Normocephalic and atraumatic.   Eyes: EOM are normal. Pupils are equal, round, and reactive to light.   Neck: Normal range of motion. Neck supple. No JVD present.   Cardiovascular: Normal rate, regular rhythm, intact distal pulses and normal pulses.   Pulses:       Carotid pulses are 2+ on the right side, and 2+ on the left side.       Radial pulses are 2+ on the right side, and 2+ on the left side.        Femoral pulses are 2+ on the right side, and 2+ on the left side.       Popliteal pulses are 2+ on the right side, and 2+ on the left side.        Dorsalis pedis pulses are 2+ on the right side, and 2+ on the left side.        Posterior tibial pulses are 2+ on the right side, and 2+ on the left side.   He has unchanged ischemic changes of the right great toe consistent with blue toe syndrome.  The foot has been marked by nursing and appears unchanged to me from my exam yesterday.   Pulmonary/Chest: Effort normal. No respiratory distress.   Abdominal: Soft. He exhibits no distension and no mass. There is no tenderness.   Musculoskeletal: Normal range of motion. He exhibits tenderness (Mild tenderness to palpation of the right great toe.). He exhibits no edema or deformity.   Neurological: He is alert and oriented to person, place, and time.   Skin: Skin is warm and dry. Capillary refill is severely diminished to the right great toe.  However to all other digits of the right foot as well as all digits of the left foot capillary refill is less than 2 seconds in both feet are warm and otherwise well perfused appearing..   Psychiatric: He has a normal mood and affect. His behavior is normal. Judgment and thought content normal.   Vitals reviewed.      Laboratory Data:   Results from last 7 days   Lab Units  08/18/19 0224 08/17/19 0442   WBC 10*3/mm3 8.10 10.07   HEMOGLOBIN g/dL 15.7 16.9   HEMATOCRIT % 46.9 49.5   PLATELETS 10*3/mm3 243 285       Results from last 7 days   Lab Units 08/18/19 0224 08/17/19 0442   SODIUM mmol/L 139 139   POTASSIUM mmol/L 4.9 4.7   CHLORIDE mmol/L 104 102   CO2 mmol/L 28.0 30.0   BUN mg/dL 11 6   CREATININE mg/dL 1.00 1.02   CALCIUM mg/dL 9.1 9.6   BILIRUBIN mg/dL 0.4  --    ALK PHOS U/L 62  --    ALT (SGPT) U/L 42  --    AST (SGOT) U/L 32  --    GLUCOSE mg/dL 96 91     Results from last 7 days   Lab Units 08/18/19  0926 08/18/19 0224 08/17/19 2021 08/17/19  0733   INR   --   --   --   --  0.92   APTT seconds 71.8* 112.0* 63.6*   < > 32.1    < > = values in this interval not displayed.            Medication Review: Reviewed, no changes.  Remains on therapeutic heparin drip.    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  At this point plan will be for the OR on Tuesday morning as the appropriate/optimal stent graft will not be available to be delivered until that time.  As such for now patient will continue on heparin drip and will continue to monitor the right first toe for demarcation.  Hypercoagulable work-up has been sent and is pending.  Echo was done and reviewed as above and shows normal EF with no other acute findings.  He continues to be in sinus rhythm on telemetry.  This was all discussed at length with the patient this morning at the bedside and he is aware and agrees.  I will continue to follow him closely.    Plan for disposition: Continued inpatient care pending OR    Karson Rivera MD  Vascular Surgery  [phone]  08/18/19  11:51 AM      Electronically signed by Karson Rivera MD at 8/18/2019 11:56 AM      Jae Vasquez, DO at 8/18/2019 11:05 AM              [facility] Medicine Services  INPATIENT PROGRESS NOTE    Patient Name: Fiona Laguerre  Date of Admission: 8/16/2019  Today's Date: 08/18/19  Length of Stay: 1  Primary Care Physician: Manas Zhu MD    Subjective   Chief Complaint: Aortic thrombus.   HPI   He seems to be doing fairly well today.  He does complain of pain in the very distal tip of his right great toe.  This has been marked by the nursing staff to follow the extent of his ischemia.  He is anxiously awaiting intervention with Dr. Rivera tomorrow.  He is on the heparin drip with PTTs checked every 6 hours.    His sister called him and wants to know what time the procedure will be tomorrow.  I told him that I am not sure.  Vascular has not been by today.    Review of Systems   All pertinent negatives and positives are as above. All other systems have been reviewed and are negative unless otherwise stated.     Objective    Temp:  [97.4 °F (36.3 °C)-98.3 °F (36.8 °C)] 97.8 °F (36.6 °C)  Heart Rate:  [64-83] 75  Resp:  [16] 16  BP: (106-121)/(71-82) 115/71  Physical Exam  Constitutional: He is oriented to person, place, and time. He appears well-developed and well-nourished.   Up in bed.  No distress.  Watching television ([employer]).  No family present. Discussed with his nurse, Joy.   Head: Normocephalic and atraumatic.   Eyes: Conjunctivae and EOM are normal. Pupils are equal, round, and reactive to light.   Neck: Neck supple. No JVD present.   Cardiovascular: Normal rate, regular rhythm, normal heart sounds and intact distal pulses. Exam reveals no gallop and no friction rub. No murmur heard.  Pulmonary/Chest: Effort normal and breath sounds normal. No respiratory distress. He has no wheezes. He has no rales. He exhibits no tenderness.   Abdominal: Soft. Bowel sounds are normal. He exhibits no distension. There is no tenderness. There is  no rebound and no guarding.   Musculoskeletal: Normal range of motion. He exhibits no edema, tenderness or deformity.   Neurological: He is alert and oriented to person, place, and time. He displays normal reflexes. No cranial nerve deficit. He exhibits normal muscle tone.   Skin: Skin is warm and dry. No rash noted. Right great toe is dusky with a purplish discoloration.  Extremely poor capillary refill of the nailbed.  See new photo below.   Psychiatric: He has a normal mood and affect. His behavior is normal. Judgment and thought content normal.        Results Review:  I have reviewed the labs, radiology results, and diagnostic studies.    Laboratory Data:   Results from last 7 days   Lab Units 08/18/19  0224 08/17/19  0442   WBC 10*3/mm3 8.10 10.07   HEMOGLOBIN g/dL 15.7 16.9   HEMATOCRIT % 46.9 49.5   PLATELETS 10*3/mm3 243 285     Results from last 7 days   Lab Units 08/18/19  0224 08/17/19  0442   SODIUM mmol/L 139 139   POTASSIUM mmol/L 4.9 4.7   CHLORIDE mmol/L 104 102   CO2 mmol/L 28.0 30.0   BUN mg/dL 11 6   CREATININE mg/dL 1.00 1.02   CALCIUM mg/dL 9.1 9.6   BILIRUBIN mg/dL 0.4  --    ALK PHOS U/L 62  --    ALT (SGPT) U/L 42  --    AST (SGOT) U/L 32  --    GLUCOSE mg/dL 96 91     Lab Results   Lab Value Date/Time    PTT 71.8 (H) 08/18/2019 0926    .0 (C) 08/18/2019 0224    PTT 63.6 (H) 08/17/2019 2021    .6 (C) 08/17/2019 1414    PTT 32.1 08/17/2019 0733     Results for orders placed during the hospital encounter of 08/16/19   Adult Transthoracic Echo Complete W/ Cont if Necessary Per Protocol    Narrative · Estimated EF = 60%.  · Left ventricular systolic function is normal.  · No evidence of pulmonary hypertension is present.        I have reviewed the patient's current medications.     Assessment/Plan     Active Hospital Problems    Diagnosis   • **Ischemic toe   • Thrombus of aorta (CMS/HCC)   • Tobacco abuse   • Mixed hyperlipidemia     Plan:   The patient was admitted on 8/16 by   Veronica.  He was transferred to our facility from [facility] in [place], [place] after presenting there with pain and discoloration of his right great toe.  They were interested in vascular surgery consultation.     He underwent CTA of the chest, abdomen, and pelvis with runoff on the night of presentation.  This ultimately showed a large, nonocclusive thrombus of the aortic bifurcation extending into both common iliac artery origins.  The patient has embolized this to his right great toe causing his current condition.     The patient has been evaluated by Dr. Rivera.  I have discussed the case with him.  The patient has been placed on a heparin drip.  He plans to take the patient to the operative suite tomorrow for angiogram and aortoiliac stent placement.  The patient will eventually need to be placed on oral anticoagulation.     Echocardiogram without any significant abnormalities. He has been NSR on telemetry thus far.      Hypercoagulable panel sent today.     He has mixed hyperlipidemia and has been started on a statin.     His hemoglobin A1c is normal.      He has not displayed any hypertension thus far.     Nicotine patch has been offered and he desperately needs to stop smoking.     Discharge Planning: Indeterminate at present.  He recently signed up for insurance.  He was told that Dr. Manas Zhu in [place] would be his primary care doctor.  He has not made an appointment with him yet.    Jae Vasquez DO   08/18/19   11:06 AM          Electronically signed by Jae Vasquez DO at 8/18/2019 11:22 AM

## 2019-08-19 NOTE — PLAN OF CARE
Problem: Patient Care Overview  Goal: Plan of Care Review  Outcome: Ongoing (interventions implemented as appropriate)   08/19/19 0329   Coping/Psychosocial   Plan of Care Reviewed With patient   Plan of Care Review   Progress no change   OTHER   Outcome Summary Discoloration of R great toe continues, no change in border or color. Pulses in RLE continue to be palpable. No numbness, no tingling. Pt denies need for pain medication. One therapeutic PTT this shift. Up ad lauri. VSS     Goal: Individualization and Mutuality  Outcome: Ongoing (interventions implemented as appropriate)    Goal: Discharge Needs Assessment  Outcome: Ongoing (interventions implemented as appropriate)    Goal: Interprofessional Rounds/Family Conf  Outcome: Ongoing (interventions implemented as appropriate)      Problem: Tissue Perfusion, Ineffective Peripheral (Adult)  Goal: Identify Related Risk Factors and Signs and Symptoms  Outcome: Ongoing (interventions implemented as appropriate)    Goal: Adequate Tissue Perfusion  Outcome: Ongoing (interventions implemented as appropriate)

## 2019-08-19 NOTE — PLAN OF CARE
Problem: Patient Care Overview  Goal: Plan of Care Review  Outcome: Ongoing (interventions implemented as appropriate)   08/19/19 1746   Coping/Psychosocial   Plan of Care Reviewed With patient   Plan of Care Review   Progress no change   OTHER   Outcome Summary Right great toe dusky and receeding from marked area, pulses palpable, continue Heparin gtt with PTT every 6 hours, rate currently at 14ml/hr, plan for OR in am with NPO after midnight and stopping Heparin gtt at 0400. Patient resting comfortably, no c.o pain, will continue to monitor.     Goal: Individualization and Mutuality  Outcome: Ongoing (interventions implemented as appropriate)   08/17/19 0046 08/19/19 0329   Individualization   Patient Specific Preferences Prefers to wear shorts from home --    Patient Specific Goals (Include Timeframe) --  prepare for future procedure and resolve tissue perfusion problems   Patient Specific Interventions --  NPO since midnight, titrate heparin drip as ordered, monitor labs     Goal: Discharge Needs Assessment  Outcome: Ongoing (interventions implemented as appropriate)   08/16/19 2663 08/19/19 1746   Discharge Needs Assessment   Readmission Within the Last 30 Days --  no previous admission in last 30 days   Concerns to be Addressed --  no discharge needs identified   Patient/Family Anticipates Transition to --  home   Patient/Family Anticipated Services at Transition none --    Transportation Concerns --  car, none   Transportation Anticipated car, drives self --    Anticipated Changes Related to Illness --  none   Equipment Needed After Discharge --  none   Disability   Equipment Currently Used at Home --  none     Goal: Interprofessional Rounds/Family Conf  Outcome: Ongoing (interventions implemented as appropriate)   08/19/19 0329   Interdisciplinary Rounds/Family Conf   Participants patient;nursing       Problem: Pain, Acute (Adult)  Intervention: Monitor and Manage Analgesia   08/19/19 0800 08/19/19 1746    Promote Effective Bowel Elimination   Bowel Intervention --  diet adjusted   Safety Management   Medication Review/Management medications reviewed;dosing adjusted --      Intervention: Mutually Develop/Implement Acute Pain Management Plan   08/19/19 1746   Promote Oxygenation/Perfusion   Pain Management Interventions quiet environment facilitated   Cognitive Interventions   Sensory Stimulation Regulation quiet environment promoted     Intervention: Support/Optimize Psychosocial Response to Acute Pain   08/18/19 0750 08/19/19 0800   Coping/Psychosocial Interventions   Supportive Measures active listening utilized --    Interventions   Trust Relationship/Rapport --  care explained;choices provided   Psychosocial Support   Diversional Activities --  television         Problem: Tissue Perfusion, Ineffective Peripheral (Adult)  Intervention: Optimize Tissue Perfusion   08/19/19 1318 08/19/19 1746   Hygiene Care   Bathing/Skin Care --  shower   Activity   Activity Management ambulated to bathroom;up in chair --      Intervention: Monitor/Assist with Self Care   08/19/19 1318 08/19/19 1746   Daily Care Interventions   Self-Care Promotion --  independence encouraged   Activity   Activity Assistance Provided independent --      Intervention: Promote/Optimize Nutrition   08/19/19 1746   Nutrition Interventions   Oral Nutrition Promotion physical activity promoted     Intervention: Minimize Pressure Points   08/19/19 0800 08/19/19 1746   Skin Interventions   Pressure Reduction Techniques --  frequent weight shift encouraged   Positioning   Head of Bed (HOB) HOB at 45 degrees --        Goal: Identify Related Risk Factors and Signs and Symptoms  Outcome: Ongoing (interventions implemented as appropriate)   08/18/19 1646   Tissue Perfusion, Ineffective Peripheral (Adult)   Related Risk Factors (Ineffective Peripheral Tissue Perfusion) venous flow reduced   Signs and Symptoms (Ineffective Peripheral Tissue Perfusion) skin color  changes     Goal: Adequate Tissue Perfusion  Outcome: Ongoing (interventions implemented as appropriate)   08/19/19 0329   Tissue Perfusion, Ineffective Peripheral (Adult)   Adequate Tissue Perfusion making progress toward outcome

## 2019-08-19 NOTE — PROGRESS NOTES
[facility] Medicine Services  INPATIENT PROGRESS NOTE    Patient Name: Fiona Laguerre  Date of Admission: 8/16/2019  Today's Date: 08/19/19  Length of Stay: 2  Primary Care Physician: Manas Zhu MD    Subjective   Chief Complaint: No chief complaint on file.    HPI   No new changes in demarcation. Pain tolerable      Review of Systems   Negative except as mentioned above in the HPI or below in the assessment and plan       Objective    Temp:  [97.5 °F (36.4 °C)-98 °F (36.7 °C)] 97.5 °F (36.4 °C)  Heart Rate:  [68-84] 84  Resp:  [16] 16  BP: (110-130)/(68-78) 130/71    Physical Exam  Constitutional: Patient is oriented to person, place, and time and well-developed, well-nourished, and in no distress.   Head: Normocephalic and atraumatic.   Eyes: Conjunctivae and EOM are normal. Pupils are equal, round, and reactive to light.   Neck: Normal range of motion. Neck supple. No JVD present. No tracheal deviation present. No thyromegaly present.   Cardiovascular: Normal rate, regular rhythm and normal heart sounds.   Pulmonary/Chest: Effort normal and breath sounds normal. No stridor. No respiratory distress. No wheezes or crackles. Patient exhibits no chest tenderness.   Abdominal: Soft. Bowel sounds are normal. Patient exhibits no distension and no mass. There is no tenderness. There is no rebound and no guarding.   Musculoskeletal: Normal range of motion. Patient exhibits no edema or deformity.   Neurological: Patient is neurological grossly intact.   Psychiatric: Mood and affect normal.     Results Review:  I have reviewed the labs, radiology results and diagnostic studies.    Laboratory Data:   Results from last 7 days   Lab Units 08/19/19  0209 08/18/19  0224 08/17/19  0442   WBC 10*3/mm3 8.43 8.10 10.07   HEMOGLOBIN g/dL 15.6 15.7 16.9   HEMATOCRIT % 46.2 46.9 49.5   PLATELETS 10*3/mm3 270 243 285        Results from last 7 days   Lab Units 08/18/19  0224 08/17/19  0442    SODIUM mmol/L 139 139   POTASSIUM mmol/L 4.9 4.7   CHLORIDE mmol/L 104 102   CO2 mmol/L 28.0 30.0   BUN mg/dL 11 6   CREATININE mg/dL 1.00 1.02   CALCIUM mg/dL 9.1 9.6   BILIRUBIN mg/dL 0.4  --    ALK PHOS U/L 62  --    ALT (SGPT) U/L 42  --    AST (SGOT) U/L 32  --    GLUCOSE mg/dL 96 91       Radiology Data:   Imaging Results (last 24 hours)     ** No results found for the last 24 hours. **          Current Medications:    Current Facility-Administered Medications:   •  acetaminophen (TYLENOL) tablet 650 mg, 650 mg, Oral, Q4H PRN **OR** acetaminophen (TYLENOL) 160 MG/5ML solution 650 mg, 650 mg, Oral, Q4H PRN **OR** acetaminophen (TYLENOL) suppository 650 mg, 650 mg, Rectal, Q4H PRN, Jameson Martin MD  •  atorvastatin (LIPITOR) tablet 40 mg, 40 mg, Oral, Nightly, Jae Vasquez DO, 40 mg at 08/18/19 2054  •  heparin (porcine) injection 3,340 Units, 40 Units/kg, Intravenous, PRN, Karson Rivera MD, 3,340 Units at 08/18/19 1529  •  heparin (porcine) injection 6,690 Units, 80 Units/kg, Intravenous, PRN, Karson Rivera MD  •  heparin 12195 units/250 mL (100 units/mL) in 0.45 % NaCl infusion, 1,500 Units/hr, Intravenous, Titrated, Karson Rivera MD, Last Rate: 13 mL/hr at 08/18/19 2339, 1,300 Units/hr at 08/18/19 2339  •  HYDROcodone-acetaminophen (NORCO)  MG per tablet 1 tablet, 1 tablet, Oral, Q6H PRN, Jae Vasquez DO  •  HYDROcodone-acetaminophen (NORCO) 5-325 MG per tablet 1 tablet, 1 tablet, Oral, Q6H PRN, Jameson Martin MD  •  nicotine (NICODERM CQ) 21 MG/24HR patch 1 patch, 1 patch, Transdermal, Q24H, Jameson Martin MD, 1 patch at 08/19/19 0859  •  ondansetron (ZOFRAN) injection 4 mg, 4 mg, Intravenous, Q6H PRN, Jameson Martin MD  •  sodium chloride 0.9 % flush 3 mL, 3 mL, Intravenous, Q12H, Jameson Martin MD, 3 mL at 08/18/19 2054  •  sodium chloride 0.9 % flush 3-10 mL, 3-10 mL, Intravenous, PRN, Jameson Martin  MD Deyanira    Assessment/Plan     Active Hospital Problems    Diagnosis   • **Ischemic toe   • Thrombus of aorta (CMS/HCC)   • Tobacco abuse   • Mixed hyperlipidemia     Blue toe syndrome  Right great toe with ischemia due to blood loss from aortic thrombus  On IV heparin.  Vascular surgery pending in the morning with stents/graft  Telemetry shows normal sinus rhythm, echocardiogram unremarkable  Hypercoagulability work-up still pending  Pain controlled with oral Norco    Hyperlipidemia  Lipitor    Pre-diabetes mellitus  Hemoglobin A1c 5.9% and patient alerted about that  He states that his mother has a history of pre-diabetes mellitus also    Nicotine addiction with withdrawal  Smoking cessation advised, teen patch placed    Bela Campos MD   08/19/19   1:24 PM    TIME SPENT: 25  minutes

## 2019-08-20 ENCOUNTER — ANESTHESIA (OUTPATIENT)
Dept: PERIOP | Facility: HOSPITAL | Age: 44
End: 2019-08-20

## 2019-08-20 ENCOUNTER — ANESTHESIA EVENT (OUTPATIENT)
Dept: PERIOP | Facility: HOSPITAL | Age: 44
End: 2019-08-20

## 2019-08-20 ENCOUNTER — APPOINTMENT (OUTPATIENT)
Dept: INTERVENTIONAL RADIOLOGY/VASCULAR | Facility: HOSPITAL | Age: 44
End: 2019-08-20

## 2019-08-20 LAB
A-A DO2: ABNORMAL MMHG
A-A DO2: ABNORMAL MMHG
ABO GROUP BLD: NORMAL
ANION GAP SERPL CALCULATED.3IONS-SCNC: 8 MMOL/L (ref 4–13)
APTT PPP: 72.8 SECONDS (ref 24.1–35)
ARTERIAL PATENCY WRIST A: ABNORMAL
ARTERIAL PATENCY WRIST A: ABNORMAL
ATMOSPHERIC PRESS: 752 MMHG
ATMOSPHERIC PRESS: 752 MMHG
BASE EXCESS BLDA CALC-SCNC: -2.2 MMOL/L (ref 0–2)
BASE EXCESS BLDA CALC-SCNC: 0.1 MMOL/L (ref 0–2)
BDY SITE: ABNORMAL
BDY SITE: ABNORMAL
BLD GP AB SCN SERPL QL: NEGATIVE
BODY TEMPERATURE: 37 C
BODY TEMPERATURE: 37.1 C
BUN BLD-MCNC: 10 MG/DL (ref 5–21)
BUN/CREAT SERPL: 9.2 (ref 7–25)
CA-I BLD-MCNC: 4.77 MG/DL (ref 4.6–5.4)
CALCIUM SPEC-SCNC: 9.6 MG/DL (ref 8.4–10.4)
CARDIOLIPIN IGG SER IA-ACNC: <9 GPL U/ML (ref 0–14)
CARDIOLIPIN IGM SER IA-ACNC: 14 MPL U/ML (ref 0–12)
CHLORIDE SERPL-SCNC: 103 MMOL/L (ref 98–110)
CO2 SERPL-SCNC: 27 MMOL/L (ref 24–31)
COHGB MFR BLD: 0.5 % (ref 0–5)
COHGB MFR BLD: 0.6 % (ref 0–5)
CREAT BLD-MCNC: 1.09 MG/DL (ref 0.5–1.4)
GFR SERPL CREATININE-BSD FRML MDRD: 73 ML/MIN/1.73
GLUCOSE BLD-MCNC: 97 MG/DL (ref 70–100)
GLUCOSE BLDC GLUCOMTR-MCNC: 199 MG/DL (ref 70–130)
HCO3 BLDA-SCNC: 23 MMOL/L (ref 20–26)
HCO3 BLDA-SCNC: 25.1 MMOL/L (ref 20–26)
HCT VFR BLD CALC: 42.2 % (ref 38–51)
HCT VFR BLD CALC: 48.1 % (ref 38–51)
HCYS SERPL-SCNC: 12.2 UMOL/L (ref 0–15)
HGB BLDA-MCNC: 13.8 G/DL (ref 14–18)
HGB BLDA-MCNC: 15.7 G/DL (ref 14–18)
HOROWITZ INDEX BLD+IHG-RTO: 100 %
Lab: ABNORMAL
METHGB BLD QL: 1 % (ref 0–3)
METHGB BLD QL: 1.1 % (ref 0–3)
MODALITY: ABNORMAL
MODALITY: ABNORMAL
NOTE: ABNORMAL
NOTE: ABNORMAL
NOTIFIED BY: ABNORMAL
NOTIFIED WHO: ABNORMAL
OXYHGB MFR BLDV: 93.3 % (ref 94–99)
OXYHGB MFR BLDV: 98.4 % (ref 94–99)
PCO2 BLDA: 40.3 MM HG (ref 35–45)
PCO2 BLDA: 41.3 MM HG (ref 35–45)
PCO2 TEMP ADJ BLD: ABNORMAL MM HG (ref 35–45)
PCO2 TEMP ADJ BLD: ABNORMAL MM HG (ref 35–45)
PH BLDA: 7.37 PH UNITS (ref 7.35–7.45)
PH BLDA: 7.39 PH UNITS (ref 7.35–7.45)
PH, TEMP CORRECTED: ABNORMAL PH UNITS (ref 7.35–7.45)
PH, TEMP CORRECTED: ABNORMAL PH UNITS (ref 7.35–7.45)
PO2 BLDA: 312 MM HG (ref 83–108)
PO2 BLDA: 77.4 MM HG (ref 83–108)
PO2 TEMP ADJ BLD: ABNORMAL MM HG (ref 83–108)
PO2 TEMP ADJ BLD: ABNORMAL MM HG (ref 83–108)
POTASSIUM BLD-SCNC: 4 MMOL/L (ref 3.5–5.3)
POTASSIUM BLD-SCNC: 5 MMOL/L (ref 3.5–5.3)
POTASSIUM BLD-SCNC: 6 MMOL/L (ref 3.5–5.3)
POTASSIUM BLDA-SCNC: 4.2 MMOL/L (ref 3.5–5.2)
POTASSIUM BLDA-SCNC: 6 MMOL/L (ref 3.5–5.2)
RH BLD: POSITIVE
SAO2 % BLDCOA: 100 % (ref 94–99)
SAO2 % BLDCOA: 94.8 % (ref 94–99)
SODIUM BLD-SCNC: 138 MMOL/L (ref 135–145)
SODIUM BLDA-SCNC: 130 MMOL/L (ref 136–145)
SODIUM BLDA-SCNC: 138 MMOL/L (ref 136–145)
T&S EXPIRATION DATE: NORMAL
VENTILATOR MODE: ABNORMAL
VENTILATOR MODE: ABNORMAL

## 2019-08-20 PROCEDURE — C1894 INTRO/SHEATH, NON-LASER: HCPCS | Performed by: SURGERY

## 2019-08-20 PROCEDURE — 37223 PR REVSC OPN/PRQ ILIAC ART W/STNT & ANGIOP IPSILATL: CPT | Performed by: SURGERY

## 2019-08-20 PROCEDURE — 25010000002 PROPOFOL 10 MG/ML EMULSION: Performed by: NURSE ANESTHETIST, CERTIFIED REGISTERED

## 2019-08-20 PROCEDURE — C1760 CLOSURE DEV, VASC: HCPCS | Performed by: SURGERY

## 2019-08-20 PROCEDURE — 86900 BLOOD TYPING SEROLOGIC ABO: CPT | Performed by: SURGERY

## 2019-08-20 PROCEDURE — 82375 ASSAY CARBOXYHB QUANT: CPT

## 2019-08-20 PROCEDURE — 04CL0ZZ EXTIRPATION OF MATTER FROM LEFT FEMORAL ARTERY, OPEN APPROACH: ICD-10-PCS | Performed by: SURGERY

## 2019-08-20 PROCEDURE — C1725 CATH, TRANSLUMIN NON-LASER: HCPCS | Performed by: SURGERY

## 2019-08-20 PROCEDURE — C1769 GUIDE WIRE: HCPCS | Performed by: SURGERY

## 2019-08-20 PROCEDURE — 37221 PR REVSC OPN/PRQ ILIAC ART W/STNT PLMT & ANGIOPLSTY: CPT | Performed by: SURGERY

## 2019-08-20 PROCEDURE — 04CN0ZZ EXTIRPATION OF MATTER FROM LEFT POPLITEAL ARTERY, OPEN APPROACH: ICD-10-PCS | Performed by: SURGERY

## 2019-08-20 PROCEDURE — C1874 STENT, COATED/COV W/DEL SYS: HCPCS | Performed by: SURGERY

## 2019-08-20 PROCEDURE — 84132 ASSAY OF SERUM POTASSIUM: CPT | Performed by: ANESTHESIOLOGY

## 2019-08-20 PROCEDURE — 94760 N-INVAS EAR/PLS OXIMETRY 1: CPT

## 2019-08-20 PROCEDURE — C1887 CATHETER, GUIDING: HCPCS | Performed by: SURGERY

## 2019-08-20 PROCEDURE — 84132 ASSAY OF SERUM POTASSIUM: CPT | Performed by: NURSE ANESTHETIST, CERTIFIED REGISTERED

## 2019-08-20 PROCEDURE — 25010000002 HEPARIN (PORCINE) PER 1000 UNITS: Performed by: NURSE ANESTHETIST, CERTIFIED REGISTERED

## 2019-08-20 PROCEDURE — C1757 CATH, THROMBECTOMY/EMBOLECT: HCPCS | Performed by: SURGERY

## 2019-08-20 PROCEDURE — 25010000002 MIDAZOLAM PER 1 MG: Performed by: ANESTHESIOLOGY

## 2019-08-20 PROCEDURE — B41G1ZZ FLUOROSCOPY OF LEFT LOWER EXTREMITY ARTERIES USING LOW OSMOLAR CONTRAST: ICD-10-PCS | Performed by: SURGERY

## 2019-08-20 PROCEDURE — 25010000002 FENTANYL CITRATE (PF) 250 MCG/5ML SOLUTION: Performed by: NURSE ANESTHETIST, CERTIFIED REGISTERED

## 2019-08-20 PROCEDURE — 75625 CONTRAST EXAM ABDOMINL AORTA: CPT | Performed by: SURGERY

## 2019-08-20 PROCEDURE — 25010000002 IOPAMIDOL 61 % SOLUTION: Performed by: SURGERY

## 2019-08-20 PROCEDURE — B41B1ZZ FLUOROSCOPY OF OTHER INTRA-ABDOMINAL ARTERIES USING LOW OSMOLAR CONTRAST: ICD-10-PCS | Performed by: SURGERY

## 2019-08-20 PROCEDURE — 94799 UNLISTED PULMONARY SVC/PX: CPT

## 2019-08-20 PROCEDURE — 80048 BASIC METABOLIC PNL TOTAL CA: CPT | Performed by: HOSPITALIST

## 2019-08-20 PROCEDURE — 37236 OPEN/PERQ PLACE STENT 1ST: CPT | Performed by: SURGERY

## 2019-08-20 PROCEDURE — 75716 ARTERY X-RAYS ARMS/LEGS: CPT | Performed by: SURGERY

## 2019-08-20 PROCEDURE — 86901 BLOOD TYPING SEROLOGIC RH(D): CPT | Performed by: SURGERY

## 2019-08-20 PROCEDURE — 25010000002 ONDANSETRON PER 1 MG: Performed by: NURSE ANESTHETIST, CERTIFIED REGISTERED

## 2019-08-20 PROCEDURE — 04CS0ZZ EXTIRPATION OF MATTER FROM LEFT POSTERIOR TIBIAL ARTERY, OPEN APPROACH: ICD-10-PCS | Performed by: SURGERY

## 2019-08-20 PROCEDURE — 63710000001 INSULIN REGULAR HUMAN PER 5 UNITS: Performed by: ANESTHESIOLOGY

## 2019-08-20 PROCEDURE — 88304 TISSUE EXAM BY PATHOLOGIST: CPT | Performed by: SURGERY

## 2019-08-20 PROCEDURE — 85730 THROMBOPLASTIN TIME PARTIAL: CPT | Performed by: SURGERY

## 2019-08-20 PROCEDURE — B41F1ZZ FLUOROSCOPY OF RIGHT LOWER EXTREMITY ARTERIES USING LOW OSMOLAR CONTRAST: ICD-10-PCS | Performed by: SURGERY

## 2019-08-20 PROCEDURE — C1773 RET DEV, INSERTABLE: HCPCS | Performed by: SURGERY

## 2019-08-20 PROCEDURE — B4101ZZ FLUOROSCOPY OF ABDOMINAL AORTA USING LOW OSMOLAR CONTRAST: ICD-10-PCS | Performed by: SURGERY

## 2019-08-20 PROCEDURE — 25010000002 HEPARIN (PORCINE) PER 1000 UNITS: Performed by: SURGERY

## 2019-08-20 PROCEDURE — 76000 FLUOROSCOPY <1 HR PHYS/QHP: CPT

## 2019-08-20 PROCEDURE — 82962 GLUCOSE BLOOD TEST: CPT

## 2019-08-20 PROCEDURE — 25010000002 DEXAMETHASONE PER 1 MG: Performed by: ANESTHESIOLOGY

## 2019-08-20 PROCEDURE — 37186 SEC ART THROMBECTOMY ADD-ON: CPT | Performed by: SURGERY

## 2019-08-20 PROCEDURE — 86850 RBC ANTIBODY SCREEN: CPT | Performed by: SURGERY

## 2019-08-20 PROCEDURE — 82805 BLOOD GASES W/O2 SATURATION: CPT

## 2019-08-20 PROCEDURE — 75716 ARTERY X-RAYS ARMS/LEGS: CPT

## 2019-08-20 PROCEDURE — 047E3DZ DILATION OF RIGHT INTERNAL ILIAC ARTERY WITH INTRALUMINAL DEVICE, PERCUTANEOUS APPROACH: ICD-10-PCS | Performed by: SURGERY

## 2019-08-20 PROCEDURE — 83050 HGB METHEMOGLOBIN QUAN: CPT

## 2019-08-20 PROCEDURE — 04V03DZ RESTRICTION OF ABDOMINAL AORTA WITH INTRALUMINAL DEVICE, PERCUTANEOUS APPROACH: ICD-10-PCS | Performed by: SURGERY

## 2019-08-20 DEVICE — IMPLANTABLE DEVICE: Type: IMPLANTABLE DEVICE | Site: AORTA | Status: FUNCTIONAL

## 2019-08-20 RX ORDER — NALOXONE HCL 0.4 MG/ML
0.04 VIAL (ML) INJECTION AS NEEDED
Status: DISCONTINUED | OUTPATIENT
Start: 2019-08-20 | End: 2019-08-20 | Stop reason: HOSPADM

## 2019-08-20 RX ORDER — ASPIRIN 81 MG/1
81 TABLET ORAL DAILY
Status: DISCONTINUED | OUTPATIENT
Start: 2019-08-20 | End: 2019-08-21 | Stop reason: HOSPADM

## 2019-08-20 RX ORDER — HYDRALAZINE HYDROCHLORIDE 20 MG/ML
5 INJECTION INTRAMUSCULAR; INTRAVENOUS
Status: DISCONTINUED | OUTPATIENT
Start: 2019-08-20 | End: 2019-08-20 | Stop reason: HOSPADM

## 2019-08-20 RX ORDER — ACETAMINOPHEN 500 MG
1000 TABLET ORAL ONCE
Status: COMPLETED | OUTPATIENT
Start: 2019-08-20 | End: 2019-08-20

## 2019-08-20 RX ORDER — SODIUM CHLORIDE 0.9 % (FLUSH) 0.9 %
3 SYRINGE (ML) INJECTION EVERY 12 HOURS SCHEDULED
Status: DISCONTINUED | OUTPATIENT
Start: 2019-08-20 | End: 2019-08-20 | Stop reason: HOSPADM

## 2019-08-20 RX ORDER — IPRATROPIUM BROMIDE AND ALBUTEROL SULFATE 2.5; .5 MG/3ML; MG/3ML
3 SOLUTION RESPIRATORY (INHALATION) ONCE AS NEEDED
Status: DISCONTINUED | OUTPATIENT
Start: 2019-08-20 | End: 2019-08-20 | Stop reason: HOSPADM

## 2019-08-20 RX ORDER — ONDANSETRON 2 MG/ML
4 INJECTION INTRAMUSCULAR; INTRAVENOUS EVERY 6 HOURS PRN
Status: DISCONTINUED | OUTPATIENT
Start: 2019-08-20 | End: 2019-08-21 | Stop reason: HOSPADM

## 2019-08-20 RX ORDER — DEXTROSE MONOHYDRATE 25 G/50ML
50 INJECTION, SOLUTION INTRAVENOUS
Status: DISCONTINUED | OUTPATIENT
Start: 2019-08-20 | End: 2019-08-20 | Stop reason: HOSPADM

## 2019-08-20 RX ORDER — PROPOFOL 10 MG/ML
VIAL (ML) INTRAVENOUS AS NEEDED
Status: DISCONTINUED | OUTPATIENT
Start: 2019-08-20 | End: 2019-08-20 | Stop reason: SURG

## 2019-08-20 RX ORDER — MORPHINE SULFATE 2 MG/ML
2 INJECTION, SOLUTION INTRAMUSCULAR; INTRAVENOUS EVERY 4 HOURS PRN
Status: DISCONTINUED | OUTPATIENT
Start: 2019-08-20 | End: 2019-08-21

## 2019-08-20 RX ORDER — NALOXONE HCL 0.4 MG/ML
0.4 VIAL (ML) INJECTION
Status: DISCONTINUED | OUTPATIENT
Start: 2019-08-20 | End: 2019-08-21 | Stop reason: HOSPADM

## 2019-08-20 RX ORDER — SODIUM CHLORIDE 0.9 % (FLUSH) 0.9 %
1-10 SYRINGE (ML) INJECTION AS NEEDED
Status: DISCONTINUED | OUTPATIENT
Start: 2019-08-20 | End: 2019-08-20 | Stop reason: HOSPADM

## 2019-08-20 RX ORDER — SODIUM CHLORIDE 9 MG/ML
INJECTION, SOLUTION INTRAVENOUS CONTINUOUS PRN
Status: DISCONTINUED | OUTPATIENT
Start: 2019-08-20 | End: 2019-08-20 | Stop reason: SURG

## 2019-08-20 RX ORDER — SODIUM CHLORIDE 0.9 % (FLUSH) 0.9 %
3 SYRINGE (ML) INJECTION AS NEEDED
Status: DISCONTINUED | OUTPATIENT
Start: 2019-08-20 | End: 2019-08-20 | Stop reason: HOSPADM

## 2019-08-20 RX ORDER — FENTANYL CITRATE 50 UG/ML
INJECTION, SOLUTION INTRAMUSCULAR; INTRAVENOUS AS NEEDED
Status: DISCONTINUED | OUTPATIENT
Start: 2019-08-20 | End: 2019-08-20 | Stop reason: SURG

## 2019-08-20 RX ORDER — METOCLOPRAMIDE HYDROCHLORIDE 5 MG/ML
5 INJECTION INTRAMUSCULAR; INTRAVENOUS
Status: DISCONTINUED | OUTPATIENT
Start: 2019-08-20 | End: 2019-08-20 | Stop reason: HOSPADM

## 2019-08-20 RX ORDER — BUPIVACAINE HYDROCHLORIDE 5 MG/ML
INJECTION, SOLUTION PERINEURAL AS NEEDED
Status: DISCONTINUED | OUTPATIENT
Start: 2019-08-20 | End: 2019-08-20 | Stop reason: HOSPADM

## 2019-08-20 RX ORDER — PHENYLEPHRINE HCL IN 0.9% NACL 0.8MG/10ML
SYRINGE (ML) INTRAVENOUS AS NEEDED
Status: DISCONTINUED | OUTPATIENT
Start: 2019-08-20 | End: 2019-08-20 | Stop reason: SURG

## 2019-08-20 RX ORDER — FENTANYL CITRATE 50 UG/ML
25 INJECTION, SOLUTION INTRAMUSCULAR; INTRAVENOUS AS NEEDED
Status: DISCONTINUED | OUTPATIENT
Start: 2019-08-20 | End: 2019-08-20 | Stop reason: HOSPADM

## 2019-08-20 RX ORDER — DEXAMETHASONE SODIUM PHOSPHATE 4 MG/ML
4 INJECTION, SOLUTION INTRA-ARTICULAR; INTRALESIONAL; INTRAMUSCULAR; INTRAVENOUS; SOFT TISSUE ONCE AS NEEDED
Status: COMPLETED | OUTPATIENT
Start: 2019-08-20 | End: 2019-08-20

## 2019-08-20 RX ORDER — CALCIUM CHLORIDE 100 MG/ML
INJECTION INTRAVENOUS; INTRAVENTRICULAR AS NEEDED
Status: DISCONTINUED | OUTPATIENT
Start: 2019-08-20 | End: 2019-08-20 | Stop reason: SURG

## 2019-08-20 RX ORDER — BUPIVACAINE HCL/0.9 % NACL/PF 0.1 %
2 PLASTIC BAG, INJECTION (ML) EPIDURAL ONCE
Status: COMPLETED | OUTPATIENT
Start: 2019-08-20 | End: 2019-08-20

## 2019-08-20 RX ORDER — SODIUM CHLORIDE, SODIUM LACTATE, POTASSIUM CHLORIDE, CALCIUM CHLORIDE 600; 310; 30; 20 MG/100ML; MG/100ML; MG/100ML; MG/100ML
100 INJECTION, SOLUTION INTRAVENOUS CONTINUOUS
Status: DISCONTINUED | OUTPATIENT
Start: 2019-08-20 | End: 2019-08-20

## 2019-08-20 RX ORDER — CLOPIDOGREL BISULFATE 75 MG/1
75 TABLET ORAL DAILY
Status: DISCONTINUED | OUTPATIENT
Start: 2019-08-20 | End: 2019-08-21 | Stop reason: HOSPADM

## 2019-08-20 RX ORDER — MORPHINE SULFATE 2 MG/ML
2 INJECTION, SOLUTION INTRAMUSCULAR; INTRAVENOUS
Status: DISCONTINUED | OUTPATIENT
Start: 2019-08-20 | End: 2019-08-20 | Stop reason: HOSPADM

## 2019-08-20 RX ORDER — LABETALOL HYDROCHLORIDE 5 MG/ML
5 INJECTION, SOLUTION INTRAVENOUS
Status: DISCONTINUED | OUTPATIENT
Start: 2019-08-20 | End: 2019-08-20 | Stop reason: HOSPADM

## 2019-08-20 RX ORDER — SENNA AND DOCUSATE SODIUM 50; 8.6 MG/1; MG/1
2 TABLET, FILM COATED ORAL 2 TIMES DAILY PRN
Status: DISCONTINUED | OUTPATIENT
Start: 2019-08-20 | End: 2019-08-21 | Stop reason: HOSPADM

## 2019-08-20 RX ORDER — ROCURONIUM BROMIDE 10 MG/ML
INJECTION, SOLUTION INTRAVENOUS AS NEEDED
Status: DISCONTINUED | OUTPATIENT
Start: 2019-08-20 | End: 2019-08-20 | Stop reason: SURG

## 2019-08-20 RX ORDER — LIDOCAINE HYDROCHLORIDE 20 MG/ML
INJECTION, SOLUTION INFILTRATION; PERINEURAL AS NEEDED
Status: DISCONTINUED | OUTPATIENT
Start: 2019-08-20 | End: 2019-08-20 | Stop reason: SURG

## 2019-08-20 RX ORDER — FLUMAZENIL 0.1 MG/ML
0.2 INJECTION INTRAVENOUS AS NEEDED
Status: DISCONTINUED | OUTPATIENT
Start: 2019-08-20 | End: 2019-08-20 | Stop reason: HOSPADM

## 2019-08-20 RX ORDER — DEXTROSE MONOHYDRATE 25 G/50ML
INJECTION, SOLUTION INTRAVENOUS
Status: COMPLETED
Start: 2019-08-20 | End: 2019-08-20

## 2019-08-20 RX ORDER — ONDANSETRON 2 MG/ML
4 INJECTION INTRAMUSCULAR; INTRAVENOUS AS NEEDED
Status: DISCONTINUED | OUTPATIENT
Start: 2019-08-20 | End: 2019-08-20 | Stop reason: HOSPADM

## 2019-08-20 RX ORDER — MIDAZOLAM HYDROCHLORIDE 1 MG/ML
2 INJECTION INTRAMUSCULAR; INTRAVENOUS
Status: DISCONTINUED | OUTPATIENT
Start: 2019-08-20 | End: 2019-08-20 | Stop reason: HOSPADM

## 2019-08-20 RX ORDER — SODIUM CHLORIDE 9 MG/ML
125 INJECTION, SOLUTION INTRAVENOUS CONTINUOUS
Status: DISCONTINUED | OUTPATIENT
Start: 2019-08-20 | End: 2019-08-21

## 2019-08-20 RX ORDER — ONDANSETRON 4 MG/1
4 TABLET, FILM COATED ORAL EVERY 6 HOURS PRN
Status: DISCONTINUED | OUTPATIENT
Start: 2019-08-20 | End: 2019-08-21 | Stop reason: HOSPADM

## 2019-08-20 RX ORDER — SODIUM CHLORIDE, SODIUM LACTATE, POTASSIUM CHLORIDE, CALCIUM CHLORIDE 600; 310; 30; 20 MG/100ML; MG/100ML; MG/100ML; MG/100ML
1000 INJECTION, SOLUTION INTRAVENOUS CONTINUOUS
Status: DISCONTINUED | OUTPATIENT
Start: 2019-08-20 | End: 2019-08-20

## 2019-08-20 RX ORDER — MIDAZOLAM HYDROCHLORIDE 1 MG/ML
1 INJECTION INTRAMUSCULAR; INTRAVENOUS
Status: DISCONTINUED | OUTPATIENT
Start: 2019-08-20 | End: 2019-08-20 | Stop reason: HOSPADM

## 2019-08-20 RX ORDER — OXYCODONE AND ACETAMINOPHEN 10; 325 MG/1; MG/1
1 TABLET ORAL ONCE AS NEEDED
Status: DISCONTINUED | OUTPATIENT
Start: 2019-08-20 | End: 2019-08-20 | Stop reason: HOSPADM

## 2019-08-20 RX ORDER — HEPARIN SODIUM 1000 [USP'U]/ML
INJECTION, SOLUTION INTRAVENOUS; SUBCUTANEOUS AS NEEDED
Status: DISCONTINUED | OUTPATIENT
Start: 2019-08-20 | End: 2019-08-20 | Stop reason: SURG

## 2019-08-20 RX ORDER — ONDANSETRON 2 MG/ML
INJECTION INTRAMUSCULAR; INTRAVENOUS AS NEEDED
Status: DISCONTINUED | OUTPATIENT
Start: 2019-08-20 | End: 2019-08-20 | Stop reason: SURG

## 2019-08-20 RX ADMIN — Medication 80 MCG: at 11:58

## 2019-08-20 RX ADMIN — SODIUM CHLORIDE, POTASSIUM CHLORIDE, SODIUM LACTATE AND CALCIUM CHLORIDE 100 ML/HR: 600; 310; 30; 20 INJECTION, SOLUTION INTRAVENOUS at 08:40

## 2019-08-20 RX ADMIN — SODIUM CHLORIDE 125 ML/HR: 9 INJECTION, SOLUTION INTRAVENOUS at 16:33

## 2019-08-20 RX ADMIN — Medication 80 MCG: at 12:25

## 2019-08-20 RX ADMIN — HEPARIN SODIUM 1000 UNITS: 1000 INJECTION, SOLUTION INTRAVENOUS; SUBCUTANEOUS at 10:41

## 2019-08-20 RX ADMIN — FENTANYL CITRATE 150 MCG: 50 INJECTION INTRAMUSCULAR; INTRAVENOUS at 09:03

## 2019-08-20 RX ADMIN — FENTANYL CITRATE 50 MCG: 50 INJECTION INTRAMUSCULAR; INTRAVENOUS at 10:19

## 2019-08-20 RX ADMIN — Medication 2 G: at 09:15

## 2019-08-20 RX ADMIN — PROPOFOL 150 MG: 10 INJECTION, EMULSION INTRAVENOUS at 09:03

## 2019-08-20 RX ADMIN — Medication 40 MCG: at 09:50

## 2019-08-20 RX ADMIN — Medication 80 MCG: at 11:22

## 2019-08-20 RX ADMIN — Medication 40 MCG: at 09:33

## 2019-08-20 RX ADMIN — ROCURONIUM BROMIDE 30 MG: 10 INJECTION INTRAVENOUS at 09:04

## 2019-08-20 RX ADMIN — Medication 80 MCG: at 12:41

## 2019-08-20 RX ADMIN — Medication 80 MCG: at 12:18

## 2019-08-20 RX ADMIN — Medication 80 MCG: at 11:13

## 2019-08-20 RX ADMIN — CALCIUM CHLORIDE 1 G: 100 INJECTION INTRAVENOUS; INTRAVENTRICULAR at 13:31

## 2019-08-20 RX ADMIN — LIDOCAINE HYDROCHLORIDE 80 MG: 20 INJECTION, SOLUTION INFILTRATION; PERINEURAL at 09:03

## 2019-08-20 RX ADMIN — ROCURONIUM BROMIDE 10 MG: 10 INJECTION INTRAVENOUS at 09:03

## 2019-08-20 RX ADMIN — SODIUM CHLORIDE: 9 INJECTION, SOLUTION INTRAVENOUS at 12:37

## 2019-08-20 RX ADMIN — HEPARIN SODIUM 1000 UNITS: 1000 INJECTION, SOLUTION INTRAVENOUS; SUBCUTANEOUS at 11:40

## 2019-08-20 RX ADMIN — Medication 80 MCG: at 11:49

## 2019-08-20 RX ADMIN — Medication 80 MCG: at 12:57

## 2019-08-20 RX ADMIN — Medication 40 MCG: at 10:51

## 2019-08-20 RX ADMIN — SODIUM CHLORIDE, POTASSIUM CHLORIDE, SODIUM LACTATE AND CALCIUM CHLORIDE: 600; 310; 30; 20 INJECTION, SOLUTION INTRAVENOUS at 12:15

## 2019-08-20 RX ADMIN — DEXTROSE MONOHYDRATE 50 ML: 25 INJECTION, SOLUTION INTRAVENOUS at 13:44

## 2019-08-20 RX ADMIN — HEPARIN SODIUM 1000 UNITS: 1000 INJECTION, SOLUTION INTRAVENOUS; SUBCUTANEOUS at 12:11

## 2019-08-20 RX ADMIN — SODIUM CHLORIDE 125 ML/HR: 9 INJECTION, SOLUTION INTRAVENOUS at 14:35

## 2019-08-20 RX ADMIN — Medication 40 MCG: at 10:58

## 2019-08-20 RX ADMIN — FENTANYL CITRATE 50 MCG: 50 INJECTION INTRAMUSCULAR; INTRAVENOUS at 10:28

## 2019-08-20 RX ADMIN — HEPARIN SODIUM 1000 UNITS: 1000 INJECTION, SOLUTION INTRAVENOUS; SUBCUTANEOUS at 10:45

## 2019-08-20 RX ADMIN — MIDAZOLAM HYDROCHLORIDE 2 MG: 1 INJECTION, SOLUTION INTRAMUSCULAR; INTRAVENOUS at 08:45

## 2019-08-20 RX ADMIN — DEXAMETHASONE SODIUM PHOSPHATE 4 MG: 4 INJECTION, SOLUTION INTRAMUSCULAR; INTRAVENOUS at 08:45

## 2019-08-20 RX ADMIN — Medication 40 MCG: at 09:42

## 2019-08-20 RX ADMIN — Medication 80 MCG: at 11:30

## 2019-08-20 RX ADMIN — ACETAMINOPHEN 1000 MG: 500 TABLET, FILM COATED ORAL at 08:45

## 2019-08-20 RX ADMIN — Medication 80 MCG: at 11:35

## 2019-08-20 RX ADMIN — CLOPIDOGREL BISULFATE 75 MG: 75 TABLET, FILM COATED ORAL at 14:29

## 2019-08-20 RX ADMIN — HEPARIN SODIUM 5000 UNITS: 1000 INJECTION, SOLUTION INTRAVENOUS; SUBCUTANEOUS at 09:40

## 2019-08-20 RX ADMIN — Medication 80 MCG: at 11:17

## 2019-08-20 RX ADMIN — SODIUM CHLORIDE, POTASSIUM CHLORIDE, SODIUM LACTATE AND CALCIUM CHLORIDE 1000 ML: 600; 310; 30; 20 INJECTION, SOLUTION INTRAVENOUS at 08:43

## 2019-08-20 RX ADMIN — Medication 80 MCG: at 11:55

## 2019-08-20 RX ADMIN — DESMOPRESSIN ACETATE 40 MG: 0.2 TABLET ORAL at 20:53

## 2019-08-20 RX ADMIN — ASPIRIN 81 MG: 81 TABLET ORAL at 14:29

## 2019-08-20 RX ADMIN — SODIUM CHLORIDE: 9 INJECTION, SOLUTION INTRAVENOUS at 12:38

## 2019-08-20 RX ADMIN — INSULIN HUMAN 10 UNITS: 100 INJECTION, SOLUTION PARENTERAL at 13:48

## 2019-08-20 RX ADMIN — ONDANSETRON HYDROCHLORIDE 4 MG: 2 SOLUTION INTRAMUSCULAR; INTRAVENOUS at 11:52

## 2019-08-20 NOTE — ANESTHESIA PREPROCEDURE EVALUATION
Anesthesia Evaluation     Patient summary reviewed   no history of anesthetic complications:  NPO Solid Status: > 8 hours  NPO Liquid Status: > 8 hours           Airway   Mallampati: II  TM distance: >3 FB  Neck ROM: full  No difficulty expected  Dental    (+) edentulous    Pulmonary    (+) a smoker,   Cardiovascular     ECG reviewed    (+) PVD (Aortic thrombus), hyperlipidemia,       Neuro/Psych- negative ROS  GI/Hepatic/Renal/Endo - negative ROS     Musculoskeletal (-) negative ROS    Abdominal    Substance History      OB/GYN          Other - negative ROS                       Anesthesia Plan    ASA 3     general   ([place])  intravenous induction   Anesthetic plan, all risks, benefits, and alternatives have been provided, discussed and informed consent has been obtained with: patient.

## 2019-08-20 NOTE — ANESTHESIA POSTPROCEDURE EVALUATION
"Patient: Fiona Laguerre    Procedure Summary     Date:  08/20/19 Room / Location:  [facility] OR  /  PAD HYBRID OR 12    Anesthesia Start:  0857 Anesthesia Stop:  1333    Procedure:  AORTOILIAC ANGIOGRAM, PLACEMENT OF AORTOILIAC STENTGRAFT, PLACEMENT OF VIABHAN STENT, THROMBECTOMY BILATERAL LOWER EXTREMITIES (N/A Thigh) Diagnosis:       Thrombus of aorta (CMS/HCC)      (Thrombus of aorta (CMS/HCC) [I74.10])    Surgeon:  Karson Rivera MD Provider:  Gracy James CRNA    Anesthesia Type:  general ASA Status:  3          Anesthesia Type: general  Last vitals  BP   122/72 (08/20/19 1515)   Temp   98.6 °F (37 °C) (08/20/19 1515)   Pulse   87 (08/20/19 1515)   Resp   20 (08/20/19 1515)     SpO2   94 % (08/20/19 1515)     Post Anesthesia Care and Evaluation    Patient location during evaluation: PACU  Patient participation: complete - patient participated  Level of consciousness: awake and awake and alert  Pain score: 0  Pain management: adequate  Airway patency: patent  Anesthetic complications: No anesthetic complications  PONV Status: none  Cardiovascular status: acceptable  Respiratory status: acceptable  Hydration status: acceptable    Comments: Patient discharged according to acceptable Tana score per RN assessment. See nursing records for further information.     Blood pressure 122/72, pulse 87, temperature 98.6 °F (37 °C), temperature source Temporal, resp. rate 20, height 177.8 cm (70\"), weight 83.6 kg (184 lb 4.9 oz), SpO2 94 %.        "

## 2019-08-20 NOTE — PROGRESS NOTES
[facility] Medicine Services  INPATIENT PROGRESS NOTE    Patient Name: Fiona Laguerre  Date of Admission: 8/16/2019  Today's Date: 08/20/19  Length of Stay: 3  Primary Care Physician: Manas Zhu MD    Subjective   Chief Complaint: No chief complaint on file.      HPI   No complaints.  Tolerated surgery well which was successful.    Review of Systems   Negative except as mentioned above in the HPI and below in the assessment and plan       Objective    Temp:  [97.6 °F (36.4 °C)-98.6 °F (37 °C)] 97.6 °F (36.4 °C)  Heart Rate:  [63-95] 90  Resp:  [16-20] 18  BP: (108-138)/(58-85) 138/75    Physical Exam  Cardiovascular: Normal rate, regular rhythm and normal heart sounds.   Pulmonary/Chest: Effort normal and breath sounds normal. No stridor. No respiratory distress. No wheezes or crackles. Patient exhibits no chest tenderness.   Abdominal: Soft. Bowel sounds are normal. Patient exhibits no distension and no mass. There is no tenderness. There is no rebound and no guarding.   Musculoskeletal: Normal range of motion. Patient exhibits no edema or deformity.   Neurological: Patient is neurological grossly intact.   Skin: Skin is warm and dry. No rash noted. No erythema.   Psychiatric: Mood and affect normal.     Results Review:  I have reviewed the labs, radiology results and diagnostic studies.    Laboratory Data:   Results from last 7 days   Lab Units 08/19/19  0209 08/18/19  0224 08/17/19  0442   WBC 10*3/mm3 8.43 8.10 10.07   HEMOGLOBIN g/dL 15.6 15.7 16.9   HEMATOCRIT % 46.2 46.9 49.5   PLATELETS 10*3/mm3 270 243 285        Results from last 7 days   Lab Units 08/20/19  1425 08/20/19  1305 08/20/19  1231 08/20/19  0900 08/20/19  0234 08/18/19  0224 08/17/19  0442   SODIUM mmol/L  --   --   --   --  138 139 139   SODIUM, ARTERIAL mmol/L  --   --  130* 138  --   --   --    POTASSIUM mmol/L 4.0 6.0*  --   --  5.0 4.9 4.7   CHLORIDE mmol/L  --   --   --   --  103 104 102   CO2  mmol/L  --   --   --   --  27.0 28.0 30.0   BUN mg/dL  --   --   --   --  10 11 6   CREATININE mg/dL  --   --   --   --  1.09 1.00 1.02   CALCIUM mg/dL  --   --   --   --  9.6 9.1 9.6   BILIRUBIN mg/dL  --   --   --   --   --  0.4  --    ALK PHOS U/L  --   --   --   --   --  62  --    ALT (SGPT) U/L  --   --   --   --   --  42  --    AST (SGOT) U/L  --   --   --   --   --  32  --    GLUCOSE mg/dL  --   --   --   --  97 96 91     Radiology Data:   Imaging Results (last 24 hours)     Procedure Component Value Units Date/Time    FL C Arm During Surgery [[id number]] Collected:  08/20/19 1427     Updated:  08/20/19 1441    Narrative:       Performed by Dr. Rivera. Please see procedure note.  This report was finalized on 08/20/2019 14:38 by Dr. Karson Rivera MD.    IR Angiogram Extremity Bilateral [[id number]] Collected:  08/20/19 1427     Updated:  08/20/19 1441    Narrative:       Performed by Dr. Rivera. Please see procedure note.  This report was finalized on 08/20/2019 14:38 by Dr. Karson Rivera MD.          Current Medications:    Current Facility-Administered Medications:   •  acetaminophen (TYLENOL) tablet 650 mg, 650 mg, Oral, Q4H PRN **OR** acetaminophen (TYLENOL) 160 MG/5ML solution 650 mg, 650 mg, Oral, Q4H PRN **OR** acetaminophen (TYLENOL) suppository 650 mg, 650 mg, Rectal, Q4H PRN, Jameson Martin MD  •  aspirin EC tablet 81 mg, 81 mg, Oral, Daily, Karson Rivera MD, 81 mg at 08/20/19 1429  •  atorvastatin (LIPITOR) tablet 40 mg, 40 mg, Oral, Nightly, Jae Vasquez DO, 40 mg at 08/19/19 2042  •  clopidogrel (PLAVIX) tablet 75 mg, 75 mg, Oral, Daily, Karson Rivera MD, 75 mg at 08/20/19 1429  •  [START ON 8/21/2019] enoxaparin (LOVENOX) syringe 40 mg, 40 mg, Subcutaneous, Daily, Karson Rivera MD  •  HYDROcodone-acetaminophen (NORCO)  MG per tablet 1 tablet, 1 tablet, Oral, Q6H PRN, Jae Vasquez,   •  HYDROcodone-acetaminophen (NORCO) 5-325 MG per tablet 1  tablet, 1 tablet, Oral, Q6H PRN, Jameson Martin MD  •  Morphine sulfate (PF) injection 2 mg, 2 mg, Intravenous, Q4H PRN **AND** naloxone (NARCAN) injection 0.4 mg, 0.4 mg, Intravenous, Q5 Min PRN, Karson Rivera MD  •  [MAR Hold] nicotine (NICODERM CQ) 21 MG/24HR patch 1 patch, 1 patch, Transdermal, Q24H, Jameson Martin MD, 1 patch at 08/19/19 0859  •  ondansetron (ZOFRAN) injection 4 mg, 4 mg, Intravenous, Q6H PRN, Jameson Martin MD  •  ondansetron (ZOFRAN) tablet 4 mg, 4 mg, Oral, Q6H PRN **OR** ondansetron (ZOFRAN) injection 4 mg, 4 mg, Intravenous, Q6H PRN, Karson Rivera MD  •  sennosides-docusate sodium (SENOKOT-S) 8.6-50 MG tablet 2 tablet, 2 tablet, Oral, BID PRN, Karson Rivera MD  •  sodium chloride 0.9 % flush 3-10 mL, 3-10 mL, Intravenous, PRN, Jameson Martin MD  •  sodium chloride 0.9 % infusion, 125 mL/hr, Intravenous, Continuous, Karson Rivera MD, Last Rate: 125 mL/hr at 08/20/19 1633, 125 mL/hr at 08/20/19 1633    Assessment/Plan     Active Hospital Problems    Diagnosis   • **Ischemic toe   • Thrombus of aorta (CMS/HCC)   • Tobacco abuse   • Mixed hyperlipidemia     Blue toe syndrome  Pt presented with right great toe with ischemia due to embolism from aortic thrombus  s/p bilateral thrombectomy - of the right popliteal and superficial femoral arteries and of the left posterior tibial, popliteal, and SFA - also stent graft in the right common iliac artery distally - now off IV heparin - good pulse, no pain  Telemetry shows normal sinus rhythm, echocardiogram unremarkable  Hypercoagulability work-up still pending    Hyperlipidemia  Lipitor     Pre-diabetes mellitus  Hemoglobin A1c 5.9% and patient alerted about that  He states that his mother has a history of pre-diabetes mellitus also     Nicotine addiction with withdrawal  Smoking cessation advised, nicotene patch placed    Bela Campos MD   08/20/19   6:04 PM    TIME  SPENT: 20  minutes

## 2019-08-20 NOTE — ANESTHESIA PROCEDURE NOTES
Arterial Line    Pre-sedation assessment completed: 8/20/2019 8:50 AM    Patient reassessed immediately prior to procedure    Patient location during procedure: pre-op  Start time: 8/20/2019 8:51 AM  Stop Time:8/20/2019 8:53 AM       Line placed for hemodynamic monitoring, ABGs/Labs/ISTAT and MD/Surgeon request.  Performed By   Anesthesiologist: Phoebe Franz MD  Preanesthetic Checklist  Completed: patient identified, site marked, surgical consent, pre-op evaluation, timeout performed, IV checked, risks and benefits discussed and monitors and equipment checked  Arterial Line Prep   Sterile Tech: gloves and sterile barriers  Prep: ChloraPrep  Patient monitoring: blood pressure monitoring, continuous pulse oximetry and EKG  Arterial Line Procedure   Laterality:right  Location:  radial artery  Catheter size: 20 G   Guidance: ultrasound guided  PROCEDURE NOTE/ULTRASOUND INTERPRETATION.  Using ultrasound guidance the potential vascular sites for insertion of the catheter were visualized to determine the patency of the vessel to be used for vascular access.  After selecting the appropriate site for insertion, the needle was visualized under ultrasound being inserted into the radial artery, followed by ultrasound confirmation of wire and catheter placement. There were no abnormalities seen on ultrasound; an image was taken; and the patient tolerated the procedure with no complications.   Number of attempts: 2  Successful placement: yes  Post Assessment   Dressing Type: occlusive dressing applied, secured with tape and wrist guard applied.   Complications no  Circ/Move/Sens Assessment: normal.   Patient Tolerance: patient tolerated the procedure well with no apparent complications

## 2019-08-20 NOTE — OP NOTE
Fiona Laguerre  8/20/2019     PREOPERATIVE DIAGNOSIS: Thrombus of aorta (CMS/HCC) [I74.10]     POSTOPERATIVE DIAGNOSIS: Post-Op Diagnosis Codes:     * Thrombus of aorta (CMS/HCC) [I74.10]     PROCEDURE PERFORMED:  1.  Ultrasound-guided right common femoral artery access  2.  Placement of 2 Perclose arterial closure devices in the right common femoral artery  3.  Ultrasound-guided left common femoral artery access  4.  Placement of catheter and abdominal aorta  5.  Aortoiliac angiogram  6.  Placement of Endologix AFX2 aortoiliac stent graft (22 x 40mm main body, 13 x 40mm iliac limbs)  7.  Placement of [place] Viabahn 10 mm x 5 cm covered stent graft in the right common iliac artery distally  8.  Completion angiogram  9.  Deployment of the 2 Perclose arterial closure devices in the right common femoral artery  10.  Aortoiliac angiogram with limited right lower extremity angiogram  11.  Right groin cutdown and exposure of the right common femoral artery  12.  Removal of Perclose arterial closure device sutures  13.  Right lower extremity angiogram  14.  Thrombectomy of the right popliteal and superficial femoral arteries using a #3 Reid balloon  15.  Completion angiogram of the left lower extremity via the left groin sheath  16.  Left groin cutdown with exposure of the left common femoral artery  17.  Selective catheter placement in the left posterior tibial artery  18.  Thrombectomy of the left posterior tibial, popliteal, and SFA  19.  Completion angiogram  20.  Radiologic supervision and interpretation     SURGEON: Karson Rivera MD     ANESTHESIA: General.    PREPARATION: Routine.    STAFF: Circulator: Aylin Crane RN; Izabella Vargas RN  Scrub Person: Celi Hernandez; Rhonda Lovell; Mayra Bains  Vendor Representative: Michele Amin  Assistant: Karley Segura  Vascular Radiology Technician: Diana Dillard    ESTIMATED BLOOD LOSS: 400 mL    SPECIMENS: Right leg  NOT INTERFERING WITH READING OR DRIVING. thrombus    COMPLICATIONS: None apparent    INDICATIONS: Fiona Laguerre is a 44 y.o. male who presented with ischemia of the right first toe to an outside hospital.  He reported that he had noted reddish and then purplish discoloration of the right first toe along with some pain for about 2 weeks prior to presentation.  He initially thought he had gout but the discoloration persisted and so he presented to [facility] ER for further evaluation.  They did not have vascular surgery available there and so he was transferred here under the hospitalist service.  On exam he did have palpable pulses however ischemia of the right first toe consistent with blue toe syndrome.  CTA of the chest as well as the abdomen/pelvis were performed which ultimately showed thrombus present at the aortic bifurcation with extension into the proximal common iliac arteries bilaterally.  Echocardiogram was done which showed a normal ejection fraction and no other acute findings.  EKG showed normal sinus rhythm and he remained in this rhythm on telemetry his entire stay.  Given the finding of the aortic thrombus with apparent embolization to the right first toe decision was made to taken to the operating room for coverage of that area with an aortoiliac stent graft. The indications, risks, and possible complications of the procedure were explained to the patient, who voiced understanding and wished to proceed with surgery.     PROCEDURE IN DETAIL: The patient was taken to the operating room and placed on the operating table in a supine position. After general anesthesia was obtained, the bilateral groins were prepped and draped in a sterile manner.  Following this under direct ultrasound guidance the right common femoral artery was accessed using a micropuncture needle.  Microwire was advanced into the distal abdominal aorta.  Micro sheath was then placed over the wire and then Glidewire advantage was introduced through the  micro sheath and placed in the proximal abdominal aorta.  The micro sheath was then removed and a 7 Panamanian sheath was then used to dilate the soft tissue tract and then removed.  Over the wire the first of 2 Perclose devices was then advanced and deployed in the 10 o'clock position.  The wire was then reintroduced and placed in the distal abdominal aorta and a second Perclose device was advanced over the wire and deployed in the 2 o'clock position.  An 11 Panamanian sheath was then placed in the right common femoral artery.  In a similar fashion under direct ultrasound guidance the left common femoral artery was accessed using a micropuncture needle.  Microwire was easily advanced into the distal abdominal aorta and micro-sheath placed.  Again Glidewire advantage was introduced and placed in the proximal abdominal aorta and a Panamanian by 25 cm sheath was then advanced and placed with its tip at the aortic bifurcation.  At this point 5000 units of IV heparin were then given.  Over the wire in the right groin sheath and Omni Flush catheter was then advanced and placed in the proximal abdominal aorta and aortoiliac angiogram performed.  Showed a widely patent abdominal aorta with patent celiac trunk, SMA, and renal arteries.  The distal aorta was patent however there was noted to be filling defects consistent with a thrombus at the bifurcation and the proximal common iliac arteries bilaterally.  There was also what appeared to be a small piece of thrombus in the mid right common iliac artery.  At this point decision was made to proceed with placement of the Endologix aortoiliac stent graft.  Through the Omni Flush catheter a [place] superstiff wire was advanced and placed with its tip in the mid thoracic aorta.  The right groin 11 Panamanian sheath was removed and the Endologix 17 Panamanian sheath was advanced over the Glidewire advantage under direct fluoroscopic guidance.  It was placed with its tip just in the distal  abdominal aorta.  On the right through the sheath the ensnare catheter was advanced and placed with its tip just in the distal abdominal aorta as well.  The device was then loaded onto the Lunderquist wire and advanced into position.  First the wire used to traverse the bifurcation and snare the contralateral limb of the AFX device was advanced through the sheath.  The tip of this wire was placed in the distal abdominal aorta.  The ensnare device was then advanced through the catheter in the left groin and the snare was deployed and used to snare this wire and it was pulled back down into the sheath and out and a flossing technique onto the field on the left side.  Slack was pulled out of this wire and then the stent graft device was advanced through the 17 Monegasque sheath in the right groin and into the distal abdominal aorta.  The sheath stent was advanced proximally enough until the complete contralateral limb was free from the sheath.  At this point the entire device was pulled back with the contralateral limb extending into the left common iliac artery and the ipsilateral limb extending into the right common iliac artery.  The graft was pulled back until it was seated at the bifurcation.  The device now in good position and no wire wrap noted main body of the device was deployed.  This looked to be in good position and so subsequently the left iliac limb was deployed and finally the right iliac limb was deployed.  Overall the main body of the device measured 22 x 40 mm.  The right limb measured 13 x 40 mm in the left limb also 13 x 40 mm.  With the device now in place over the wire in the left groin the pigtail catheter was advanced and used to free the wire from the more proximal portion of the deployment sheath and with this free the pigtail catheter was advanced with its tip placed in the abdominal aorta proximal to the proximal portion of the graft.  Glidewire advantage was then reintroduced through the  pigtail catheter in the left groin and the catheter removed.  Then over the wire in the right groin a 10 x 40 mm ever cross balloon catheter was advanced and used to angioplasty the right limb of the stent graft to nominal pressure.  In a similar fashion the balloon catheter was then removed and advanced over the wire in the left groin and used to angioplasty the left limb of the stent graft to nominal pressure.  With this complete the pigtail catheter was then reintroduced over the wire in the right groin and the wire removed.  Completion angiogram at this point now showed excellent position of the aortoiliac stent graft with brisk flow of contrast through and no evidence of significant stenosis.  However in the right common iliac distal to the area of stent graft there was noted to still be a portion of thrombus within the lumen.  Decision was made to cover this area.  As such over the Lunderquist wire and the right groin a 10 mm x 5 cm [place] Viabahn covered stent was advanced and deployed with its distalmost aspect just proximal to the internal and external iliac artery bifurcation.  There was at least 1 cm of overlap with the more proximal aortoiliac stent graft.  Again angiogram was performed from the pigtail catheter in the left groin and there is now noted to be excellent flow through the stents with no evidence of any residual thrombus or stenosis present.  At this point the introducer was readvanced into the 17 Vietnamese sheath in the right groin.  The sheath was pulled back and removed and the previously placed Perclose devices were deployed.  Hemostasis noted was noted be good and the Lunderquist wire was ultimately removed as well.  However when completion angiogram from the left groin sheath was performed there was noted to be what appeared to be thrombus material within the common femoral artery at the site of Perclose device deployment which was restricting flow and causing significant stenosis.  Given  this finding decision was made to cut down on the right groin.  An oblique incision was made parallel to the inguinal ligament centered over the common femoral artery and carried down through subcutaneous tissue with electrocautery.  The common femoral artery was identified and circumferentially dissected starting at the inguinal ligament and working distally.  The sutures from the Perclose device were also noted.  Proximally and distally the artery was controlled using Silastic Vesseloops.  An additional 2000 units of IV heparin was given at this point.  With the artery controlled the sutures were removed and the arteriotomy extended both proximally and medially in a transverse direction.  There was noted to be some thrombus and debris noted at this point which was directly removed.  There was noted to be backflow from the distal common femoral artery as well as good inflow from the proximal common femoral.  Prior to closing the arteriotomy a right lower extremity angiogram was performed using hand-held contrast and this did show distally in the SFA more retained thrombus material.  As such #3 Reid was passed under fluoroscopic guidance down into the distal popliteal artery and thrombectomy performed.  Organized appearing thrombus was encountered and removed after multiple passes made.  With a final pass no further thrombus was retrieved and completion angiogram showed now good flow through the SFA and flow into the foot via both patent anterior tibial and posterior tibial arteries.  As such no further intervention was necessary.  The artery was then irrigated with heparinized saline solution and the arteriotomy repaired with interrupted 5-0 Prolene sutures.  There was noted to be an excellent common femoral artery pulse at the conclusion.  The wound was then packed with saline soaked gauze and attention turned to the left groin.  Prior to removal of the 7 Arabic sheath a left lower extremity angiogram was  performed via the sheath.  There was noted to be a widely patent distal external iliac, common moral, and profunda arteries.  The SFA also was noted to be widely patent in the anterior tibial artery noted to be patent into the foot.  However the posterior tibial artery appeared to occlude in its mid to distal portion.  As well clinically the patient had no Doppler signal at the posterior tibial pulse which was present preoperatively.  As such there was concern for further embolization also down this leg and so decision was made to cut down on the left groin.  Again in a similar fashion an oblique incision was made in the left groin parallel to the inguinal ligament just proximal to the sheath entry site into the skin.  Dissection was carried down through the subcu tinea tissue with electrocautery and the sheath encountered at its entry point into the common femoral artery.  Common femoral artery was circumferentially dissected both proximally and distal to the sheath entry point and controlled with Silastic Vesseloops.  An additional 2000 units of heparin was given at this point.  The artery was then controlled proximally and distally and the sheath removed.  The arteriotomy was extended medially and laterally in a transverse fashion using Coffey scissors.  Again angiogram with hand-held contrast was made directly from the common femoral arteriotomy and showed patent common femoral and profunda.  The SFA was also patent and again the anterior tibial artery noted to be patent into the foot.  However there was cut off of the mid to distal posterior tibial artery concerning for thrombus.  As such a Glidewire advantage was introduced into the common femoral artery and using the glide catheter was advanced distally into the distal posterior tibial artery without issue.  With the catheter in the distal posterior tibial artery angiogram was performed which showed the distal artery to be patent.  At this point suction  thrombectomy using the glide catheter was performed with retrieval of some thrombus.  Again hand-held contrast was used to perform angiogram via the common femoral arteriotomy and now been were noted to have patent flow directly from the common femoral artery into the foot via both patent anterior tibial and posterior tibial arteries.  At this point no further intervention was deemed necessary.  The wire and catheter were removed and the arteriotomy flushed with heparinized saline after being forward and backward bled.  The arteriotomy was then closed with interrupted 5-0 Prolene sutures.  At this point the patient was noted to have strong Doppler signals present in the bilateral feet and both feet were warm.  As such no intervention was deemed necessary.  Both groin wounds were then irrigated copiously with antibiotic saline solution and hemostasis achieved using thrombin-soaked Gelfoam.  Hemostasis was noted to be excellent.  Both wounds were then closed in similar fashion using 2-0 Vicryl for the femoral sheath, 2-0 Vicryl for deep tissue, 3-0 Vicryl for subcutaneous tissue, and 4-0 Monocryl subcuticular stitch for skin.  10 cc of 1% lidocaine was infiltrated at each incision site for local anesthesia.   Sterile dressings were applied. The patient tolerated the procedure well. Sponge and needle counts were correct. The patient was then awakened and extubated in the operating room and taken to the recovery room in good condition.    Karson Rivera MD  Date: 8/20/2019 Time: 1:33 PM

## 2019-08-20 NOTE — PLAN OF CARE
Problem: Patient Care Overview  Goal: Plan of Care Review  Outcome: Ongoing (interventions implemented as appropriate)   08/20/19 0311   Coping/Psychosocial   Plan of Care Reviewed With patient   Plan of Care Review   Progress no change   OTHER   Outcome Summary right great toe remains dusky. pulses palpable. heparin gtt at 13ml/hr after a 30 min hold. plan for or this am.      Goal: Individualization and Mutuality  Outcome: Ongoing (interventions implemented as appropriate)    Goal: Discharge Needs Assessment  Outcome: Ongoing (interventions implemented as appropriate)    Goal: Interprofessional Rounds/Family Conf  Outcome: Ongoing (interventions implemented as appropriate)      Problem: Tissue Perfusion, Ineffective Peripheral (Adult)  Goal: Identify Related Risk Factors and Signs and Symptoms  Outcome: Ongoing (interventions implemented as appropriate)    Goal: Adequate Tissue Perfusion  Outcome: Ongoing (interventions implemented as appropriate)

## 2019-08-20 NOTE — ANESTHESIA PROCEDURE NOTES
Airway  Urgency: elective    Airway not difficult    General Information and Staff    Patient location during procedure: OR  CRNA: Gracy James CRNA    Indications and Patient Condition  Indications for airway management: airway protection    Preoxygenated: yes  Mask difficulty assessment: 2 - vent by mask + OA or adjuvant +/- NMBA    Final Airway Details  Final airway type: endotracheal airway      Successful airway: ETT  Cuffed: yes   Successful intubation technique: direct laryngoscopy  Facilitating devices/methods: intubating stylet  Endotracheal tube insertion site: oral  Blade: Mp  Blade size: 3.5.  ETT size (mm): 7.5  Cormack-Lehane Classification: grade I - full view of glottis  Placement verified by: chest auscultation and capnometry   Cuff volume (mL): 8  Measured from: lips  ETT to lips (cm): 22  Number of attempts at approach: 1    Additional Comments  Atraumatic. 6cc placed in ETT cuff- leak. Another 2cc added- no leak.

## 2019-08-21 VITALS
HEART RATE: 88 BPM | DIASTOLIC BLOOD PRESSURE: 88 MMHG | BODY MASS INDEX: 26.39 KG/M2 | TEMPERATURE: 98.2 F | SYSTOLIC BLOOD PRESSURE: 115 MMHG | OXYGEN SATURATION: 99 % | WEIGHT: 184.3 LBS | RESPIRATION RATE: 16 BRPM | HEIGHT: 70 IN

## 2019-08-21 LAB
ANION GAP SERPL CALCULATED.3IONS-SCNC: 7 MMOL/L (ref 4–13)
BASOPHILS # BLD AUTO: 0.04 10*3/MM3 (ref 0–0.2)
BASOPHILS NFR BLD AUTO: 0.2 % (ref 0–1.5)
BUN BLD-MCNC: 12 MG/DL (ref 5–21)
BUN/CREAT SERPL: 10.6 (ref 7–25)
CALCIUM SPEC-SCNC: 9 MG/DL (ref 8.4–10.4)
CHLORIDE SERPL-SCNC: 103 MMOL/L (ref 98–110)
CO2 SERPL-SCNC: 26 MMOL/L (ref 24–31)
CREAT BLD-MCNC: 1.13 MG/DL (ref 0.5–1.4)
DEPRECATED RDW RBC AUTO: 42.1 FL (ref 37–54)
EOSINOPHIL # BLD AUTO: 0.01 10*3/MM3 (ref 0–0.4)
EOSINOPHIL NFR BLD AUTO: 0.1 % (ref 0.3–6.2)
ERYTHROCYTE [DISTWIDTH] IN BLOOD BY AUTOMATED COUNT: 12.9 % (ref 12.3–15.4)
FACT V ACT/NOR PPP: 127 % (ref 70–150)
GFR SERPL CREATININE-BSD FRML MDRD: 70 ML/MIN/1.73
GLUCOSE BLD-MCNC: 97 MG/DL (ref 70–100)
HCT VFR BLD AUTO: 37.7 % (ref 37.5–51)
HGB BLD-MCNC: 12.7 G/DL (ref 13–17.7)
IMM GRANULOCYTES # BLD AUTO: 0.09 10*3/MM3 (ref 0–0.05)
IMM GRANULOCYTES NFR BLD AUTO: 0.5 % (ref 0–0.5)
LA NT DPL PPP: 44.8 SEC (ref 0–55)
LA NT DPL/LA NT HPL PPP-RTO: 1.12 RATIO (ref 0–1.4)
LA NT PLATELET PPP: 48.6 SEC (ref 0–51.9)
LUPUS ANTICOAGULANT REFLEX: ABNORMAL
LYMPHOCYTES # BLD AUTO: 2.87 10*3/MM3 (ref 0.7–3.1)
LYMPHOCYTES NFR BLD AUTO: 16.1 % (ref 19.6–45.3)
MCH RBC QN AUTO: 30.1 PG (ref 26.6–33)
MCHC RBC AUTO-ENTMCNC: 33.7 G/DL (ref 31.5–35.7)
MCV RBC AUTO: 89.3 FL (ref 79–97)
MONOCYTES # BLD AUTO: 1.47 10*3/MM3 (ref 0.1–0.9)
MONOCYTES NFR BLD AUTO: 8.3 % (ref 5–12)
NEUTROPHILS # BLD AUTO: 13.32 10*3/MM3 (ref 1.7–7)
NEUTROPHILS NFR BLD AUTO: 74.8 % (ref 42.7–76)
NRBC BLD AUTO-RTO: 0 /100 WBC (ref 0–0.2)
PLATELET # BLD AUTO: 233 10*3/MM3 (ref 140–450)
PMV BLD AUTO: 9.8 FL (ref 6–12)
POTASSIUM BLD-SCNC: 4.5 MMOL/L (ref 3.5–5.3)
PROT C AG PPP IA-ACNC: 96 % (ref 60–150)
PROT S PPP-ACNC: 101 % (ref 60–150)
PROTEIN S-FUNCTIONAL: 108 % (ref 63–140)
PRT C ACTIVITY (CHROMOGENIC): 98 %
RBC # BLD AUTO: 4.22 10*6/MM3 (ref 4.14–5.8)
SCREEN DRVVT: 43.8 SEC (ref 0–47)
SODIUM BLD-SCNC: 136 MMOL/L (ref 135–145)
THROMBIN NEUTRALIZATION: 22.3 SEC (ref 0–23)
THROMBIN TIME MIX: 101.6 SEC (ref 0–23)
THROMBIN TIME: >150 SEC (ref 0–23)
WBC NRBC COR # BLD: 17.8 10*3/MM3 (ref 3.4–10.8)

## 2019-08-21 PROCEDURE — 25010000002 ENOXAPARIN PER 10 MG: Performed by: SURGERY

## 2019-08-21 PROCEDURE — 85025 COMPLETE CBC W/AUTO DIFF WBC: CPT | Performed by: SURGERY

## 2019-08-21 PROCEDURE — 80048 BASIC METABOLIC PNL TOTAL CA: CPT | Performed by: SURGERY

## 2019-08-21 PROCEDURE — 99024 POSTOP FOLLOW-UP VISIT: CPT | Performed by: SURGERY

## 2019-08-21 RX ORDER — ACETAMINOPHEN 325 MG/1
650 TABLET ORAL EVERY 4 HOURS PRN
Qty: 30 TABLET | Refills: 0 | Status: SHIPPED | OUTPATIENT
Start: 2019-08-21 | End: 2019-09-04

## 2019-08-21 RX ORDER — CLOPIDOGREL BISULFATE 75 MG/1
75 TABLET ORAL DAILY
Qty: 30 TABLET | Refills: 0 | Status: SHIPPED | OUTPATIENT
Start: 2019-08-22 | End: 2019-09-04 | Stop reason: SDUPTHER

## 2019-08-21 RX ORDER — ASPIRIN 81 MG/1
81 TABLET ORAL DAILY
Qty: 30 TABLET | Refills: 0 | Status: SHIPPED | OUTPATIENT
Start: 2019-08-22 | End: 2020-12-21

## 2019-08-21 RX ORDER — ATORVASTATIN CALCIUM 40 MG/1
40 TABLET, FILM COATED ORAL NIGHTLY
Qty: 30 TABLET | Refills: 0 | Status: SHIPPED | OUTPATIENT
Start: 2019-08-21 | End: 2019-09-04 | Stop reason: SDUPTHER

## 2019-08-21 RX ORDER — OXYCODONE HYDROCHLORIDE AND ACETAMINOPHEN 5; 325 MG/1; MG/1
1 TABLET ORAL EVERY 6 HOURS PRN
Qty: 20 TABLET | Refills: 0 | Status: SHIPPED | OUTPATIENT
Start: 2019-08-21 | End: 2019-09-04

## 2019-08-21 RX ADMIN — ASPIRIN 81 MG: 81 TABLET ORAL at 09:14

## 2019-08-21 RX ADMIN — CLOPIDOGREL BISULFATE 75 MG: 75 TABLET, FILM COATED ORAL at 09:14

## 2019-08-21 RX ADMIN — ENOXAPARIN SODIUM 40 MG: 40 INJECTION SUBCUTANEOUS at 09:14

## 2019-08-21 NOTE — PROGRESS NOTES
LOS: 4 days   Patient Care Team:  Manas Zhu MD as PCP - General (Family Medicine)    Chief Complaint:  Ischemic R 1st toe, aortic thrombus    Subjective     Patient seen and examined at bedside.  No acute events overnight. POD #1 s/p placement of Endologix AFX2 aortoiliac stent graft for exclusion of aortic bifurcation thrombus, bilateral groin cutdowns, and bilateral lower extremity thrombectomy.  He tolerated the procedure well.  This morning he complains of mild pain at the groin incision sites with palpation of the area but otherwise feels well.  He was initially on bedrest post procedure for 4 hours but has since been up and ambulatory.  Shields catheter remains in place and urine output has been excellent.  Otherwise the right first toe remains unchanged but he denies any pain at the site.  Heparin drip was stopped post procedure yesterday and he is currently on aspirin 81 mg daily as well as Plavix 75 mg daily.  No other issues.    Objective     Vital Signs  Temp:  [97.5 °F (36.4 °C)-98.6 °F (37 °C)] 97.5 °F (36.4 °C)  Heart Rate:  [63-95] 82  Resp:  [16-20] 16  BP: (108-138)/(58-85) 111/70    Physical Exam   Constitutional: He is oriented to person, place, and time. He appears well-developed and well-nourished.   HENT:   Head: Normocephalic and atraumatic.   Eyes: EOM are normal. Pupils are equal, round, and reactive to light.   Neck: Normal range of motion. Neck supple. No JVD present.   Cardiovascular: Normal rate, regular rhythm, intact distal pulses and normal pulses.   Pulses:       Carotid pulses are 2+ on the right side, and 2+ on the left side.       Radial pulses are 2+ on the right side, and 2+ on the left side.        Femoral pulses are 2+ on the right side, and 2+ on the left side.       Popliteal pulses are 2+ on the right side, and 2+ on the left side.        Dorsalis pedis pulses are 2+ on the right side, and 2+ on the left side.        Posterior tibial pulses are 2+ on the right  side, and 2+ on the left side.   He has unchanged ischemic changes of the right great toe consistent with blue toe syndrome.    Pulmonary/Chest: Effort normal. No respiratory distress.   Abdominal: Soft. He exhibits no distension and no mass. There is no tenderness.   Musculoskeletal: Normal range of motion. He exhibits tenderness (Mild tenderness to palpation of the bilateral groin incisions.). He exhibits no edema or deformity.   Neurological: He is alert and oriented to person, place, and time.   Skin: Skin is warm and dry. Capillary refill is severely diminished to the right great toe.  However to all other digits of the right foot as well as all digits of the left foot capillary refill is less than 2 seconds in both feet are warm and otherwise well perfused appearing..   Bilateral groin dressings remain in place and are clean, dry, and intact.   Psychiatric: He has a normal mood and affect. His behavior is normal. Judgment and thought content normal.   Vitals reviewed.      Laboratory Data:   Results from last 7 days   Lab Units 08/21/19  0550 08/19/19  0209 08/18/19  0224   WBC 10*3/mm3 17.80* 8.43 8.10   HEMOGLOBIN g/dL 12.7* 15.6 15.7   HEMATOCRIT % 37.7 46.2 46.9   PLATELETS 10*3/mm3 233 270 243       Results from last 7 days   Lab Units 08/21/19  0550 08/20/19  1425 08/20/19  1305 08/20/19  1231 08/20/19  0900 08/20/19  0234 08/18/19  0224   SODIUM mmol/L 136  --   --   --   --  138 139   SODIUM, ARTERIAL mmol/L  --   --   --  130* 138  --   --    POTASSIUM mmol/L 4.5 4.0 6.0*  --   --  5.0 4.9   CHLORIDE mmol/L 103  --   --   --   --  103 104   CO2 mmol/L 26.0  --   --   --   --  27.0 28.0   BUN mg/dL 12  --   --   --   --  10 11   CREATININE mg/dL 1.13  --   --   --   --  1.09 1.00   CALCIUM mg/dL 9.0  --   --   --   --  9.6 9.1   BILIRUBIN mg/dL  --   --   --   --   --   --  0.4   ALK PHOS U/L  --   --   --   --   --   --  62   ALT (SGPT) U/L  --   --   --   --   --   --  42   AST (SGOT) U/L  --   --    --   --   --   --  32   GLUCOSE mg/dL 97  --   --   --   --  97 96     Results from last 7 days   Lab Units 08/20/19  0234 08/19/19 2017 08/19/19  1438  08/17/19  0733   INR   --   --   --   --  0.92   APTT seconds 72.8* 109.1* 66.0*   < > 32.1    < > = values in this interval not displayed.            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  He is now POD #1 s/p placement of Endologix AFX2 aortoiliac stent graft with additional [place] Viabahn covered stentgraft in the distal right common iliac artery for exclusion of aortic bifurcation thrombus, bilateral groin cutdowns, and bilateral lower extremity thrombectomy.   -D/C guillermo catheter  -OOB as tolerated, ambulate ad lauri  -Heparin gtt stopped post op yeaterday  -Continue ASA 81mg, Plavix 75mg daily, statin  -Hypercoagulable workup negative so far  -Pain control PRN  -Diet as tolerated, will D/C IVF  -Potentially clear for D/C from vascular standpoint this afternoon if ambulating and feeling well  -Please call with any questions or concerns    Plan for disposition: As above    Karson Rivera MD  Vascular Surgery  [phone]  08/21/19  6:53 AM

## 2019-08-21 NOTE — DISCHARGE SUMMARY
"    [facility] Medicine Services  DISCHARGE SUMMARY       Date of Admission: 8/16/2019  Date of Discharge:  8/21/2019    Primary Care Physician: Manas Zhu MD    Presenting Problem/History of Present Illness:  Ischemic toe [I99.8]  Aortic thrombus (CMS/HCC) [I74.10]     Final Discharge Diagnoses:  Active Hospital Problems    Diagnosis   • **Ischemic toe   • Thrombus of aorta (CMS/HCC)   • Tobacco abuse   • Mixed hyperlipidemia       Hospital Course:  The patient is a 44 y.o. male who presented to [facility] with     Blue toe syndrome  Pt presented with right great toe with ischemia due to embolism from aortic thrombus  s/p bilateral thrombectomy - of the right popliteal and superficial femoral arteries and of the left posterior tibial, popliteal, and SFA - also stent graft in the right common iliac artery distally - now off IV heparin and on Plavix and low dose ASA - good pulse, no pain  Telemetry shows normal sinus rhythm, echocardiogram unremarkable  Hypercoagulability negative so far     Hyperlipidemia  Lipitor     Pre-diabetes mellitus  Hemoglobin A1c 5.9% and patient alerted about that  He states that his mother has a history of pre-diabetes mellitus also     Nicotine addiction with withdrawal  Smoking cessation advised, nicotene patch placed    Condition on Discharge: good  Patient denies any more toe pain and is ambulating in the halls    Physical Exam on Discharge:  /88 (BP Location: Left arm, Patient Position: Sitting)   Pulse 88   Temp 98.2 °F (36.8 °C)   Resp 16   Ht 177.8 cm (70\")   Wt 83.6 kg (184 lb 4.9 oz)   SpO2 99%   BMI 26.44 kg/m²     Physical Exam   Constitutional: Patient is oriented to person, place, and time and well-developed, well-nourished, and in no distress.   Head: Normocephalic and atraumatic.   Eyes: Conjunctivae and EOM are normal. Pupils are equal, round, and reactive to light.   Neck: Normal range of motion. Neck " supple. No JVD present. No tracheal deviation present. No thyromegaly present.   Cardiovascular: Normal rate, regular rhythm and normal heart sounds.   Pulmonary/Chest: Effort normal and breath sounds normal. No stridor. No respiratory distress. No wheezes or crackles. Patient exhibits no chest tenderness.   Abdominal: Soft. Bowel sounds are normal. Patient exhibits no distension and no mass. There is no tenderness. There is no rebound and no guarding.   Musculoskeletal: Normal range of motion. Patient exhibits no edema or deformity.   Neurological: Patient is neurological grossly intact.   Skin: Skin is warm and dry. No rash noted. No erythema.   Psychiatric: Mood and affect normal.      Discharge Disposition: Home  Home or Self Care  Discharge Medications:     Discharge Medications      New Medications      Instructions Start Date   acetaminophen 325 MG tablet  Commonly known as:  TYLENOL   650 mg, Oral, Every 4 Hours PRN      aspirin 81 MG EC tablet   81 mg, Oral, Daily   Start Date:  8/22/2019     atorvastatin 40 MG tablet  Commonly known as:  LIPITOR   40 mg, Oral, Nightly      clopidogrel 75 MG tablet  Commonly known as:  PLAVIX   75 mg, Oral, Daily   Start Date:  8/22/2019        ASK your doctor about these medications      Instructions Start Date   Perflutren Protein A Microsph suspension  Ask about: Should I take this medication?   3 mL, Intravenous, Once             Discharge Diet:  cardiac    Activity at Discharge: as discharge    Discharge Care Plan/Instructions: stop smoking    Follow-up Appointments:   No future appointments.    F/U with PCP in 1 week - please establish care with one if you do not have one  Please send remaining labs for hypercoagulability w/u to PCP  F/U with vascular surgery as indicated    Bela Campos MD  08/21/19  11:39 AM    Time: 35 mins

## 2019-08-22 ENCOUNTER — READMISSION MANAGEMENT (OUTPATIENT)
Dept: CALL CENTER | Facility: HOSPITAL | Age: 44
End: 2019-08-22

## 2019-08-22 NOTE — PAYOR COMM NOTE
"DC HOME 8-21-19    [id number]  Fiona Laguerre (44 y.o. Male)     Date of Birth Social Security Number Address Home Phone MRN    1975  [address] [place] [id number] [phone] [id number]    Orth[employer] Marital Status          Other Single       Admission Date Admission Type Admitting Provider Attending Provider Department, Room/Bed    8/16/19 Urgent Bela Campos MD  [facility] 3C, 386/1    Discharge Date Discharge Disposition Discharge Destination        8/21/2019 Home or Self Care              Attending Provider:  (none)   Allergies:  No Known Allergies    Isolation:  None   Infection:  None   Code Status:  Prior    Ht:  177.8 cm (70\")   Wt:  83.6 kg (184 lb 4.9 oz)    Admission Cmt:  None   Principal Problem:  Ischemic toe [I99.8]                 Active Insurance as of 8/16/2019     Primary Coverage     Payor Plan Insurance Group Employer/Plan Group    [employer]HE[employer] MEDICAID [employer] MEDICAID [id number]     Payor Plan Address Payor Plan Phone Number Payor Plan Fax Number Effective Dates    [address] [address] [phone]  8/15/2019 - None Entered    [facility] [id number]       Subscriber Name Subscriber Birth Date Member ID       FIONA LAGUERRE 1975 [id number]                 Emergency Contacts      (Rel.) Home Phone Work Phone Mobile Phone    Tiff Douglas (Mother) [phone] -- --               Discharge Summary      Bela Campos MD at 8/21/2019 11:31 AM              [facility] Medicine Services  DISCHARGE SUMMARY       Date of Admission: 8/16/2019  Date of Discharge:  8/21/2019    Primary Care Physician: Manas Zhu MD    Presenting Problem/History of Present Illness:  Ischemic toe [I99.8]  Aortic thrombus (CMS/HCC) [I74.10]     Final Discharge Diagnoses:  Active Hospital Problems    Diagnosis   • **Ischemic toe   • Thrombus of aorta (CMS/HCC)   • Tobacco abuse   • Mixed hyperlipidemia       Hospital Course:  The " "patient is a 44 y.o. male who presented to [facility] with     Blue toe syndrome  Pt presented with right great toe with ischemia due to embolism from aortic thrombus  s/p bilateral thrombectomy - of the right popliteal and superficial femoral arteries and of the left posterior tibial, popliteal, and SFA - also stent graft in the right common iliac artery distally - now off IV heparin and on Plavix and low dose ASA - good pulse, no pain  Telemetry shows normal sinus rhythm, echocardiogram unremarkable  Hypercoagulability negative so far     Hyperlipidemia  Lipitor     Pre-diabetes mellitus  Hemoglobin A1c 5.9% and patient alerted about that  He states that his mother has a history of pre-diabetes mellitus also     Nicotine addiction with withdrawal  Smoking cessation advised, nicotene patch placed    Condition on Discharge: good  Patient denies any more toe pain and is ambulating in the halls    Physical Exam on Discharge:  /88 (BP Location: Left arm, Patient Position: Sitting)   Pulse 88   Temp 98.2 °F (36.8 °C)   Resp 16   Ht 177.8 cm (70\")   Wt 83.6 kg (184 lb 4.9 oz)   SpO2 99%   BMI 26.44 kg/m²      Physical Exam   Constitutional: Patient is oriented to person, place, and time and well-developed, well-nourished, and in no distress.   Head: Normocephalic and atraumatic.   Eyes: Conjunctivae and EOM are normal. Pupils are equal, round, and reactive to light.   Neck: Normal range of motion. Neck supple. No JVD present. No tracheal deviation present. No thyromegaly present.   Cardiovascular: Normal rate, regular rhythm and normal heart sounds.   Pulmonary/Chest: Effort normal and breath sounds normal. No stridor. No respiratory distress. No wheezes or crackles. Patient exhibits no chest tenderness.   Abdominal: Soft. Bowel sounds are normal. Patient exhibits no distension and no mass. There is no tenderness. There is no rebound and no guarding.   Musculoskeletal: Normal range of motion. " Patient exhibits no edema or deformity.   Neurological: Patient is neurological grossly intact.   Skin: Skin is warm and dry. No rash noted. No erythema.   Psychiatric: Mood and affect normal.      Discharge Disposition: Home  Home or Self Care  Discharge Medications:     Discharge Medications      New Medications      Instructions Start Date   acetaminophen 325 MG tablet  Commonly known as:  TYLENOL   650 mg, Oral, Every 4 Hours PRN      aspirin 81 MG EC tablet   81 mg, Oral, Daily   Start Date:  8/22/2019     atorvastatin 40 MG tablet  Commonly known as:  LIPITOR   40 mg, Oral, Nightly      clopidogrel 75 MG tablet  Commonly known as:  PLAVIX   75 mg, Oral, Daily   Start Date:  8/22/2019        ASK your doctor about these medications      Instructions Start Date   Perflutren Protein A Microsph suspension  Ask about: Should I take this medication?   3 mL, Intravenous, Once             Discharge Diet:  cardiac    Activity at Discharge: as discharge    Discharge Care Plan/Instructions: stop smoking    Follow-up Appointments:   No future appointments.    F/U with PCP in 1 week - please establish care with one if you do not have one  Please send remaining labs for hypercoagulability w/u to PCP  F/U with vascular surgery as indicated    Bela Campos MD  08/21/19  11:39 AM    Time: 35 mins    Electronically signed by Bela Campos MD at 8/21/2019 11:41 AM

## 2019-08-22 NOTE — OUTREACH NOTE
Prep Survey      Responses   Facility patient discharged from?  [facility]   Is patient eligible?  Yes   Discharge diagnosis  **Ischemic toe, Thrombus of aorta    Does the patient have one of the following disease processes/diagnoses(primary or secondary)?  General Surgery   Does the patient have Home health ordered?  No   Is there a DME ordered?  No   Medication alerts for this patient  ASA, Plavix    Prep survey completed?  Yes          Maryanne Newsome RN

## 2019-08-23 ENCOUNTER — READMISSION MANAGEMENT (OUTPATIENT)
Dept: CALL CENTER | Facility: HOSPITAL | Age: 44
End: 2019-08-23

## 2019-08-23 LAB
F2 GENE MUT ANL BLD/T: NORMAL
F5 GENE MUT ANL BLD/T: NORMAL

## 2019-08-23 NOTE — OUTREACH NOTE
General Surgery Week 1 Survey      Responses   Facility patient discharged from?  [place] [facility]   Does the patient have one of the following disease processes/diagnoses(primary or secondary)?  General Surgery   Is there a successful TCM telephone encounter documented?  No   Week 1 attempt successful?  Yes   Call start time  0757   Discharge diagnosis  **Ischemic toe, Thrombus of aorta    Is patient permission given to speak with other caregiver?  No   Medication alerts for this patient  ASA, Plavix    Meds reviewed with patient/caregiver?  Yes   Is the patient having any side effects they believe may be caused by any medication additions or changes?  Yes   Does the patient have all medications related to this admission filled (includes all antibiotics, pain medications, etc.)  N/A   Is the patient taking all medications as directed (includes completed medication regime)?  Yes   Does the patient have a follow up appointment scheduled with their surgeon?  Yes   Has the patient kept scheduled appointments due by today?  N/A   Comments  PCP 08/30/190 11 am and Dr. Rivera )09/04/19 1: 15 pm    Has home health visited the patient within 72 hours of discharge?  N/A   Psychosocial issues?  No   Did the patient receive a copy of their discharge instructions?  Yes   Nursing interventions  Reviewed instructions with patient, Educated on MyChart   What is the patient's perception of their health status since discharge?  Improving   Nursing interventions  Nurse provided patient education   Is the patient /caregiver able to teach back basic post-op care?  Drive as instructed by MD in discharge instructions, Practice 'cough and deep breath', Take showers only when approved by MD-sponge bathe until then, No tub bath, swimming, or hot tub until instructed by MD, Keep incision areas clean,dry and protected, Do not remove steri-strips   Is the patient/caregiver able to teach back signs and symptoms of incisional infection?  Increased  redness, swelling or pain at the incisonal site, Increased drainage or bleeding, Incisional warmth, Pus or odor from incision, Fever   Is the patient/caregiver able to teach back steps to recovery at home?  Set small, achievable goals for return to baseline health, Rest and rebuild strength, gradually increase activity, Weigh daily, Practice good oral hygiene, Eat a well-balance diet, Make a list of questions for surgeon's appointment   If the patient is a current smoker, are they able to teach back resources for cessation?  Smoking cessation support groups, 0-735-GreoHwz   Is the patient/caregiver able to teach back the hierarchy of who to call/visit for symptoms/problems? PCP, Specialist, Home health nurse, Urgent Care, ED, 911  Yes   Week 1 call completed?  Yes          aMry Grace Mantilla RN

## 2019-08-25 LAB
CYTO UR: NORMAL
LAB AP CASE REPORT: NORMAL
PATH REPORT.FINAL DX SPEC: NORMAL
PATH REPORT.GROSS SPEC: NORMAL

## 2019-08-30 ENCOUNTER — READMISSION MANAGEMENT (OUTPATIENT)
Dept: CALL CENTER | Facility: HOSPITAL | Age: 44
End: 2019-08-30

## 2019-08-30 ENCOUNTER — TELEPHONE (OUTPATIENT)
Dept: VASCULAR SURGERY | Facility: CLINIC | Age: 44
End: 2019-08-30

## 2019-08-30 NOTE — OUTREACH NOTE
General Surgery Week 2 Survey      Responses   Facility patient discharged from?  [place]   Does the patient have one of the following disease processes/diagnoses(primary or secondary)?  General Surgery   Week 2 attempt successful?  Yes   Call start time  1403   Call end time  1404   Discharge diagnosis  **Ischemic toe, Thrombus of aorta    Is patient permission given to speak with other caregiver?  No   Meds reviewed with patient/caregiver?  Yes   Is the patient having any side effects they believe may be caused by any medication additions or changes?  No   Does the patient have all medications related to this admission filled (includes all antibiotics, pain medications, etc.)  Yes   Is the patient taking all medications as directed (includes completed medication regime)?  Yes   Does the patient have a follow up appointment scheduled with their surgeon?  Yes   Has the patient kept scheduled appointments due by today?  Yes   Psychosocial issues?  No   Did the patient receive a copy of their discharge instructions?  Yes   Nursing interventions  Reviewed instructions with patient, Educated on MyChart   What is the patient's perception of their health status since discharge?  Improving   Nursing interventions  Nurse provided patient education   Is the patient /caregiver able to teach back basic post-op care?  Drive as instructed by MD in discharge instructions, Take showers only when approved by MD-sponge bathe until then, No tub bath, swimming, or hot tub until instructed by MD, Keep incision areas clean,dry and protected   Is the patient/caregiver able to teach back signs and symptoms of incisional infection?  Increased redness, swelling or pain at the incisonal site, Increased drainage or bleeding, Incisional warmth, Pus or odor from incision, Fever   Is the patient/caregiver able to teach back steps to recovery at home?  Set small, achievable goals for return to baseline health, Rest and rebuild strength, gradually  increase activity   Is the patient/caregiver able to teach back the hierarchy of who to call/visit for symptoms/problems? PCP, Specialist, Home health nurse, Urgent Care, ED, 911  Yes   Week 2 call completed?  Yes          Luna Carmichael RN

## 2019-08-30 NOTE — TELEPHONE ENCOUNTER
I tried to contact the patient to remind him of his appt on Wednesday 9/4, with Dr. Rivera.  I left a message with this info on his phone.

## 2019-09-04 ENCOUNTER — OFFICE VISIT (OUTPATIENT)
Dept: VASCULAR SURGERY | Facility: CLINIC | Age: 44
End: 2019-09-04

## 2019-09-04 VITALS
SYSTOLIC BLOOD PRESSURE: 92 MMHG | OXYGEN SATURATION: 99 % | HEIGHT: 70 IN | BODY MASS INDEX: 25.91 KG/M2 | HEART RATE: 106 BPM | DIASTOLIC BLOOD PRESSURE: 62 MMHG | WEIGHT: 181 LBS

## 2019-09-04 DIAGNOSIS — I75.021 BLUE TOE SYNDROME OF RIGHT LOWER EXTREMITY (HCC): ICD-10-CM

## 2019-09-04 DIAGNOSIS — I74.10 THROMBUS OF AORTA (HCC): Primary | ICD-10-CM

## 2019-09-04 DIAGNOSIS — E78.2 MIXED HYPERLIPIDEMIA: ICD-10-CM

## 2019-09-04 PROBLEM — I75.029 BLUE TOE SYNDROME: Status: ACTIVE | Noted: 2019-09-04

## 2019-09-04 PROCEDURE — 99024 POSTOP FOLLOW-UP VISIT: CPT | Performed by: SURGERY

## 2019-09-04 RX ORDER — CLOPIDOGREL BISULFATE 75 MG/1
75 TABLET ORAL DAILY
Qty: 30 TABLET | Refills: 3 | Status: SHIPPED | OUTPATIENT
Start: 2019-09-04 | End: 2019-12-21 | Stop reason: HOSPADM

## 2019-09-04 RX ORDER — ATORVASTATIN CALCIUM 40 MG/1
40 TABLET, FILM COATED ORAL NIGHTLY
Qty: 30 TABLET | Refills: 3 | Status: SHIPPED | OUTPATIENT
Start: 2019-09-04

## 2019-09-04 NOTE — PATIENT INSTRUCTIONS

## 2019-09-04 NOTE — PROGRESS NOTES
09/04/2019      Manas Zhu MD  [address] [place] [address]        Fiona Laguerre  1975    Chief Complaint   Patient presents with   • Post-op     2 wk po angiogram.  Pt states that he is doing well.        Dear Manas Zhu MD:    HPI     I had the pleasure of seeing your patient in the office today for follow up.  As you recall, the patient is a 44 y.o. male who we are currently following for blue toe syndrome.  He initially presented as a transfer from an outside hospital with ischemia of the right great toe.  Ultimately he was found to have thrombus material present at the aortic bifurcation as the source of embolus for the blue toe syndrome.  He underwent aortoiliac angiogram with placement of an [employer]ix bifurcated aortoiliac endograft for exclusion of the thrombus material as well as bilateral groin cutdowns and thrombectomies.  He tolerated the procedure well.  Today he reports feeling well with no pain.  He is gone back to all of his normal activities of daily living without issue.  He feels the groin incisions are healing well and has no other complaints.  He has continued taking his aspirin, Plavix, and statin as previously instructed.    Review of Systems   Constitutional: Negative.  Negative for activity change, appetite change, chills, diaphoresis, fatigue and fever.   HENT: Negative.  Negative for congestion, sneezing, sore throat and trouble swallowing.    Eyes: Negative.  Negative for visual disturbance.   Respiratory: Negative.  Negative for chest tightness and shortness of breath.    Cardiovascular: Negative.  Negative for chest pain, palpitations and leg swelling.   Gastrointestinal: Negative.  Negative for abdominal distention, abdominal pain, nausea and vomiting.   Endocrine: Negative.    Genitourinary: Negative.    Musculoskeletal: Negative.    Skin: Negative.    Allergic/Immunologic: Negative.    Neurological: Negative.    Hematological: Negative.   "  Psychiatric/Behavioral: Negative.        BP 92/62   Pulse 106   Ht 177.8 cm (70\")   Wt 82.1 kg (181 lb)   SpO2 99%   BMI 25.97 kg/m²   Physical Exam   Constitutional: He is oriented to person, place, and time. He appears well-developed and well-nourished.   HENT:   Head: Normocephalic and atraumatic.   Eyes: EOM are normal. Pupils are equal, round, and reactive to light.   Neck: Normal range of motion. Neck supple. No JVD present.   Cardiovascular: Normal rate, regular rhythm, intact distal pulses and normal pulses.   Pulses:       Carotid pulses are 2+ on the right side, and 2+ on the left side.       Radial pulses are 2+ on the right side, and 2+ on the left side.        Femoral pulses are 2+ on the right side, and 2+ on the left side.       Popliteal pulses are 2+ on the right side, and 2+ on the left side.        Dorsalis pedis pulses are 2+ on the right side, and 2+ on the left side.        Posterior tibial pulses are 2+ on the right side, and 2+ on the left side.   Bilateral groin incisions are healing well.  Steri-Strips were removed today.  There is some mild induration at both sites but no erythema or signs of infection.   Pulmonary/Chest: Effort normal. No respiratory distress.   Abdominal: Soft. He exhibits no distension.   Musculoskeletal: Normal range of motion. He exhibits no edema, tenderness or deformity.   Neurological: He is alert and oriented to person, place, and time.   Skin: Skin is warm and dry. Capillary refill takes less than 2 seconds.   Psychiatric: He has a normal mood and affect. His behavior is normal. Judgment and thought content normal.   Vitals reviewed.      DIAGNOSTIC DATA:    Ct Angiogram Chest With & Without Contrast    Result Date: 8/17/2019  Narrative: Exam: CT ANGIOGRAM CHEST W WO CONTRAST-  Indication: search of clot; PAD  Comparison: None available.  DOSE LENGTH PRODUCT: 225 mGy cm. Automated exposure control was also utilized to decrease patient radiation dose.  " Findings:  No pulmonary embolus identified. Main pulmonary trunk is normal in caliber. Thoracic aorta appears normal. No pericardial effusion.  The central airways are clear. No consolidation, pleural effusion, or pneumothorax. No suspicious pulmonary nodule. No enlarged thoracic lymph nodes. No acute finding in the upper abdomen. No suspicious osseous finding.      Impression:  No evidence of pulmonary embolus or other acute finding.  These findings are in agreement with the critical and emergent findings from the [facility] preliminary report. This report was finalized on 08/17/2019 07:49 by Dr. Leo Mercedes MD.    Ir Angiogram Extremity Bilateral    Result Date: 8/20/2019  Narrative: Performed by Dr. Rivera. Please see procedure note. This report was finalized on 08/20/2019 14:38 by Dr. Karson Rivera MD.    Ct Angio Abdominal Aorta Bilateral Iliofem Runoff With & Without Contrast    Result Date: 8/17/2019  Narrative: Exam: CT ANGIO ABDOMINAL AORTA BILAT ILIOFEM RUNOFF W WO CONTRAST-  Indication: Peripheral arterial disease, blue toe syndrome  Comparison: None available.  DOSE LENGTH PRODUCT: 461.8 mGy cm. Automated exposure control was also utilized to decrease patient radiation dose.  Findings:  Vascular findings:  The abdominal aorta is normal in caliber. The celiac trunk and SMA are widely patent. No definite opacification of the BROOKE. Renal arteries are widely patent. There is a nonocclusive thrombus at the aortic bifurcation extending into both proximal common iliac arteries. This is best demonstrated on coronal image 60 of series 7. Both common iliac arteries maintain patency.  On the right, the right internal iliac, external iliac, common femoral, deep femoral, superficial femoral, impossible to artery are widely patent. Normal trifurcation with three-vessel runoff through the foot. Dorsalis pedis and plantar artery are patent.  On the left, the left internal iliac, external iliac, common femoral, deep  femoral, superficial femoral, and popliteal arteries are widely patent. Normal trifurcation with three-vessel runoff through the foot. Dorsalis pedis and plantar arteries are opacified.  Nonvascular findings:  No arterial enhancing liver lesion. Gallbladder appears normal. No biliary ductal dilatation. Pancreas, spleen, and right adrenal gland appear normal. Indeterminate 1.3 cm left adrenal gland nodule. No solid renal mass. No urolithiasis or hydronephrosis. No focal urinary bladder wall abnormality. Prostate is unremarkable. No abnormal bowel distention or adjacent inflammation. No ascites or free pelvic fluid. No pelvic mass or organized pelvic collections. Small left inguinal fat-containing hernia. No enlarged abdominal or pelvic lymph nodes. No suspicious osseous finding.       Impression:  Large nonocclusive thrombus at the aortic bifurcation extending into both common iliac artery origins (coronal image 59, series 7). This could represent source of reported distal emboli. The BROOKE is not definitely opacified, which may be a chronic finding. Otherwise, no major vascular occlusion is identified. Bilateral three-vessel runoff is maintained.  Indeterminate 1.3 cm left adrenal gland nodule. This likely represents an adenoma but if clinically indicated, further evaluation could be obtained with CT/MR adrenal protocol.  -- Findings of thrombus discussed discussed with current provider. Patient is to be evaluated by vascular surgery. This report was finalized on 08/17/2019 08:12 by Dr. Leo Mercedes MD.    Fl C Arm During Surgery    Result Date: 8/20/2019  Narrative: Performed by Dr. Rivera. Please see procedure note. This report was finalized on 08/20/2019 14:38 by Dr. Karson Rivera MD.      Patient Active Problem List   Diagnosis   • Ischemic toe   • Thrombus of aorta (CMS/HCC)   • Tobacco abuse   • Mixed hyperlipidemia   • Blue toe syndrome (CMS/HCC)         ICD-10-CM ICD-9-CM   1. Thrombus of aorta (CMS/HCC)  I74.10 444.1   2. Blue toe syndrome of right lower extremity (CMS/HCC) I75.021 445.02   3. Mixed hyperlipidemia E78.2 272.2           PLAN: After thoroughly evaluating Fiona Laguerre, I believe the best course of action is to remain conservative from a vascular standpoint.  Overall he has done well since angiogram and placement of aortoiliac stent graft as well as bilateral groin cutdown with thrombectomy of the bilateral lower extremities.  His groin incisions are healing well and he denies any pain.  At this point I will plan to see him back in the office in 2 weeks with a repeat CTA of the abdomen/pelvis for surveillance of the graft 1 month post procedure.  Otherwise he can return to all of his normal activities with no further restrictions.  The patient is to continue taking their medications as previously discussed.   I did discuss vascular risk factors as they pertain to the progression of vascular disease including controlling hyperlipidemia. These factors remain stable.  Of note since his time here admitted to the hospital he has quit smoking which he is commended for.  Patient's Body mass index is 25.97 kg/m². BMI is above normal parameters. Recommendations include: educational material. This was all discussed in full with complete understanding.  Thank you for allowing me to participate in the care of your patient.  Please do not hesitate to call with any questions or concerns.  We will keep you aware of any further encounters with Fiona Laguerre.      Sincerely Yours,      Karson Rivera MD

## 2019-09-06 ENCOUNTER — READMISSION MANAGEMENT (OUTPATIENT)
Dept: CALL CENTER | Facility: HOSPITAL | Age: 44
End: 2019-09-06

## 2019-09-06 NOTE — OUTREACH NOTE
General Surgery Week 3 Survey      Responses   Facility patient discharged from?  [place]   Does the patient have one of the following disease processes/diagnoses(primary or secondary)?  General Surgery   Week 3 attempt successful?  Yes   Call start time  1557   Call end time  1559   Meds reviewed with patient/caregiver?  Yes   Is the patient taking all medications as directed (includes completed medication regime)?  Yes   Has the patient kept scheduled appointments due by today?  Yes   What is the patient's perception of their health status since discharge?  Improving   Week 3 call completed?  Yes   Revoked  No further contact(revokes)-requires comment   Graduated/Revoked comments  He saw Dr. Rivera yesterday and all was fine, will call us if needed          Cesia Murillo RN

## 2019-09-16 ENCOUNTER — TELEPHONE (OUTPATIENT)
Dept: VASCULAR SURGERY | Facility: CLINIC | Age: 44
End: 2019-09-16

## 2019-09-16 NOTE — TELEPHONE ENCOUNTER
I contacted the patient to remind him of his appt on Wednesday with testing and to see Dr. Rivera.  He was aware

## 2019-09-17 ENCOUNTER — TELEPHONE (OUTPATIENT)
Dept: VASCULAR SURGERY | Facility: CLINIC | Age: 44
End: 2019-09-17

## 2019-09-17 NOTE — TELEPHONE ENCOUNTER
Left message advising patient that his insurance had yet to authorize his test for tomorrow.  I cancelled both the test and the appointment with Dr Rivera.  Advised once I heard back about the authorization that I would reschedule everything. Left all contact information for patient to return my call.

## 2019-09-18 ENCOUNTER — APPOINTMENT (OUTPATIENT)
Dept: CT IMAGING | Facility: HOSPITAL | Age: 44
End: 2019-09-18

## 2019-09-23 ENCOUNTER — TELEPHONE (OUTPATIENT)
Dept: VASCULAR SURGERY | Facility: CLINIC | Age: 44
End: 2019-09-23

## 2019-09-23 NOTE — TELEPHONE ENCOUNTER
I called to remind the patient of his appts for testing and to see Dr. Rivera on Wednesday.  The patient was aware and confirmed.

## 2019-09-25 ENCOUNTER — OFFICE VISIT (OUTPATIENT)
Dept: VASCULAR SURGERY | Facility: CLINIC | Age: 44
End: 2019-09-25

## 2019-09-25 ENCOUNTER — HOSPITAL ENCOUNTER (OUTPATIENT)
Dept: CT IMAGING | Facility: HOSPITAL | Age: 44
Discharge: HOME | End: 2019-09-25
Admitting: SURGERY

## 2019-09-25 VITALS
BODY MASS INDEX: 25.91 KG/M2 | OXYGEN SATURATION: 98 % | DIASTOLIC BLOOD PRESSURE: 80 MMHG | SYSTOLIC BLOOD PRESSURE: 122 MMHG | HEIGHT: 70 IN | HEART RATE: 76 BPM | WEIGHT: 181 LBS

## 2019-09-25 DIAGNOSIS — I74.10 AORTIC THROMBUS (HCC): Primary | ICD-10-CM

## 2019-09-25 DIAGNOSIS — I75.021 BLUE TOE SYNDROME OF RIGHT LOWER EXTREMITY (HCC): ICD-10-CM

## 2019-09-25 DIAGNOSIS — I74.10 THROMBUS OF AORTA (HCC): ICD-10-CM

## 2019-09-25 PROCEDURE — 74174 CTA ABD&PLVS W/CONTRAST: CPT

## 2019-09-25 PROCEDURE — 0 IOPAMIDOL PER 1 ML: Performed by: SURGERY

## 2019-09-25 PROCEDURE — 99214 OFFICE O/P EST MOD 30 MIN: CPT | Performed by: SURGERY

## 2019-09-25 RX ADMIN — IOPAMIDOL 150 ML: 755 INJECTION, SOLUTION INTRAVENOUS at 12:35

## 2019-09-25 NOTE — PROGRESS NOTES
"09/25/2019      Manas Zhu MD  [address] [address]        Fiona Laguerre  1975    Chief Complaint   Patient presents with   • Follow-up     2 wk F/u with CTA of the abdomen/pelvis.       Dear Manas Zhu MD:    HPI     I had the pleasure of seeing your patient in the office today for follow up.  As you recall, the patient is a 44 y.o. male who we are currently following for aortic thrombus and resultant blue toe syndrome and he is now 1 month status post aortogram with placement of aortoiliac stent graft for exclusion of aortic thrombus.  He tolerated the procedure very well with no acute issues.  He returns to the office today after having undergone CTA of the abdomen and pelvis for evaluation of the stent graft.  I did personally review these images and they show the stent graft to be in excellent position with no further evidence of any aortic thrombus and no sign of any endoleak.  Patient reports feeling well with no claudication or rest pain.  He is return to all of his normal activities without any incident.  He otherwise is without complaint today.      Review of Systems   Constitutional: Negative.  Negative for activity change, appetite change, chills, diaphoresis, fatigue and fever.   HENT: Negative.  Negative for congestion, sneezing, sore throat and trouble swallowing.    Eyes: Negative.  Negative for visual disturbance.   Respiratory: Negative.  Negative for chest tightness and shortness of breath.    Cardiovascular: Negative.  Negative for chest pain, palpitations and leg swelling.   Gastrointestinal: Negative.  Negative for abdominal distention, abdominal pain, nausea and vomiting.   Endocrine: Negative.    Genitourinary: Negative.    Musculoskeletal: Negative.    Skin: Negative.    Allergic/Immunologic: Negative.    Neurological: Negative.    Hematological: Negative.    Psychiatric/Behavioral: Negative.        /80   Pulse 76   Ht 177.8 cm (70\")   Wt 82.1 kg " (181 lb)   SpO2 98%   BMI 25.97 kg/m²   Physical Exam   Constitutional: He is oriented to person, place, and time. He appears well-developed and well-nourished.   HENT:   Head: Normocephalic and atraumatic.   Eyes: EOM are normal. Pupils are equal, round, and reactive to light.   Neck: Normal range of motion. Neck supple. No JVD present.   Cardiovascular: Normal rate, regular rhythm, intact distal pulses and normal pulses.   Pulses:       Carotid pulses are 2+ on the right side, and 2+ on the left side.       Radial pulses are 2+ on the right side, and 2+ on the left side.        Femoral pulses are 2+ on the right side, and 2+ on the left side.       Popliteal pulses are 2+ on the right side, and 2+ on the left side.        Dorsalis pedis pulses are 2+ on the right side, and 2+ on the left side.        Posterior tibial pulses are 2+ on the right side, and 2+ on the left side.   Bilateral groin incisions are well-healed.   Pulmonary/Chest: Effort normal. No respiratory distress.   Abdominal: Soft. He exhibits no distension.   Musculoskeletal: Normal range of motion. He exhibits no edema, tenderness or deformity.   Neurological: He is alert and oriented to person, place, and time.   Skin: Skin is warm and dry. Capillary refill takes less than 2 seconds.   Psychiatric: He has a normal mood and affect. His behavior is normal. Judgment and thought content normal.   Vitals reviewed.      DIAGNOSTIC DATA:    Ct Angiogram Abdomen Pelvis With & Without Contrast    Result Date: 9/25/2019  Narrative: EXAMINATION:   CT ANGIOGRAM ABDOMEN PELVIS W WO CONTRAST-  9/25/2019 1:13 PM CDT  HISTORY: CT ANGIOGRAM ABDOMEN PELVIS W WO CONTRAST- 9/25/2019 12:33 PM CDT  HISTORY: s/p aortoiliac stent graft for blue toe syndrome; I74.10-Embolism and thrombosis of unspecified parts of aorta; I75.021-Atheroembolism of right lower extremity  COMPARISON: 08/16/2019  DOSE LENGTH PRODUCT: 1116 mGy cm. Automated exposure control was also utilized  to decrease patient radiation dose.  TECHNIQUE: Helical tomographic images of the abdomen and pelvis utilizing angiographic protocol were obtained without and with the intravenous infusion of contrast. Multiplanar and 3 D reformatted images were provided for review.  FINDINGS:  Angiogram: The visualized aorta is normal in caliber and demonstrates no evidence of dissection, stenosis or vessel cut off. No significant atherosclerosis. A short endovascular aortoiliac stent graft is present satisfactorily position. Endovascular stent graft is also noted in the right common iliac artery The lumen is patent.  Bilateral common iliac, external iliac, internal iliac, common femoral, and visualized superficial and deep femoral arteries demonstrate no aneurysm, dissection, stenosis or vessel cut off. No significant atherosclerosis is identified.  The celiac artery and its major branches are normal in appearance. The superior mesenteric artery and its proximal branches are normal in appearance. 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  Bilateral renal arteries are normal in appearance.  Other findings: Liver and spleen are unremarkable. Pancreas is normal. Gallbladder is unremarkable. Adrenal glands are visualized. Renal contours demonstrate symmetric excretion of contrast. There is no evidence of bowel obstruction. The appendix is unremarkable. A few diverticula are noted. The bladder is visualized.      Impression: 1. Endovascular aortic iliac stent graft is present there is no plaque in the aorta iliac or femoral arteries..  This report was finalized on 09/25/2019 13:27 by Dr. Luke Paz MD.      Patient Active Problem List   Diagnosis   • Ischemic toe   • Thrombus of aorta (CMS/HCC)   • Tobacco abuse   • Mixed hyperlipidemia   • Blue toe syndrome (CMS/HCC)         ICD-10-CM ICD-9-CM   1. Aortic thrombus (CMS/HCC) I74.10 444.1       Lab Frequency Next Occurrence   CT Angiogram Abdomen Pelvis With & Without Contrast Once  09/25/2020       PLAN: After thoroughly evaluating Fiona Laguerre, I believe the best course of action is to remain conservative from a vascular standpoint.  He is done well after his aortogram with placement of aortoiliac stent graft for exclusion of aortic thrombus.  I will plan to see him back here in the office in 1 year with a repeat CTA of the abdomen/pelvis for further surveillance of his graft.  The patient is to continue taking their medications as previously discussed.   I did discuss vascular risk factors as they pertain to the progression of vascular disease including controlling hypertension, and hyperlipidemia. These factors remain stable. Patient's Body mass index is 25.97 kg/m². BMI is above normal parameters. Recommendations include: educational material.   This was all discussed in full with complete understanding.  Thank you for allowing me to participate in the care of your patient.  Please do not hesitate to call with any questions or concerns.  We will keep you aware of any further encounters with Fiona Laguerre.      Sincerely Yours,      Karson Rivera MD

## 2019-09-25 NOTE — PATIENT INSTRUCTIONS

## 2019-12-12 ENCOUNTER — TELEPHONE (OUTPATIENT)
Dept: VASCULAR SURGERY | Facility: CLINIC | Age: 44
End: 2019-12-12

## 2019-12-12 NOTE — TELEPHONE ENCOUNTER
"The patient's mother called and stated that the patient's toe is turning black and the bottom of his foot.  She states that his leg \"gave out on him\" and he fell and \"busted up his face\".  She states that this is the right leg.  She said that they couldn't get him to go to the ER after his last call.  He will come to the office tomorrow.  Dr. Rivera didn't wish for the patient to have any testing done prior to this appt.   "

## 2019-12-13 ENCOUNTER — HOSPITAL ENCOUNTER (OUTPATIENT)
Dept: CT IMAGING | Facility: HOSPITAL | Age: 44
Discharge: HOME | End: 2019-12-13
Admitting: SURGERY

## 2019-12-13 ENCOUNTER — OFFICE VISIT (OUTPATIENT)
Dept: VASCULAR SURGERY | Facility: CLINIC | Age: 44
End: 2019-12-13

## 2019-12-13 VITALS
HEIGHT: 70 IN | DIASTOLIC BLOOD PRESSURE: 86 MMHG | SYSTOLIC BLOOD PRESSURE: 124 MMHG | HEART RATE: 68 BPM | BODY MASS INDEX: 27.2 KG/M2 | OXYGEN SATURATION: 98 % | WEIGHT: 190 LBS

## 2019-12-13 DIAGNOSIS — I70.211 ATHEROSCLEROSIS OF NATIVE ARTERY OF RIGHT LOWER EXTREMITY WITH INTERMITTENT CLAUDICATION (HCC): Primary | ICD-10-CM

## 2019-12-13 DIAGNOSIS — I70.211 ATHEROSCLEROSIS OF NATIVE ARTERY OF RIGHT LOWER EXTREMITY WITH INTERMITTENT CLAUDICATION (HCC): ICD-10-CM

## 2019-12-13 LAB — CREAT BLDA-MCNC: 1.1 MG/DL (ref 0.6–1.3)

## 2019-12-13 PROCEDURE — 0 IOPAMIDOL PER 1 ML: Performed by: SURGERY

## 2019-12-13 PROCEDURE — 75635 CT ANGIO ABDOMINAL ARTERIES: CPT

## 2019-12-13 PROCEDURE — 99214 OFFICE O/P EST MOD 30 MIN: CPT | Performed by: SURGERY

## 2019-12-13 PROCEDURE — 82565 ASSAY OF CREATININE: CPT

## 2019-12-13 RX ADMIN — IOPAMIDOL 200 ML: 755 INJECTION, SOLUTION INTRAVENOUS at 11:26

## 2019-12-13 NOTE — PATIENT INSTRUCTIONS

## 2019-12-13 NOTE — PROGRESS NOTES
"12/13/2019      Manas Zhu MD  [address] [place] [address]        Fiona Laguerre  1975    Chief Complaint   Patient presents with   • Follow-up     Discoloration to the toes.  Pt had stopped Plavix and has restarted this medication about a week ago.  He states that he has improvement in his symptoms.  Pt stated that his right leg \"gave out on him\" and he fell last weekend.        Dear Manas Zhu MD:    HPI     I had the pleasure of seeing your patient in the office today for follow up.  As you recall, the patient is a 44 y.o. male who we are currently following for previous aortic thrombus.  He had undergone angiogram with placement of an aortobiiliac stent graft back in August.  He had been seen here 1 month postoperatively and at that time a CTA was done which showed excellent position of the stent graft which was widely patent and no further evidence of any thrombus.  He had palpable bilateral pedal pulses at that time with no claudication.  He was advised to continue on both aspirin and Plavix at that visit moving forward.  However he reports for about the last month he has been experiencing right lower extremity claudication mainly in the calf.  It is at fairly short distances at this point and is making it difficult for him to work.  About a week ago he reports he had some weakness to the right leg and had a fall and suffered some scratches to his face.  On further questioning he reports that about a month ago he did stop his Plavix for a week or 2 for unknown reasons but has now since restarted.  Otherwise he denies any ischemic rest pain.  After being seen in the office today he was sent for a CTA of the abdominal aorta with runoff which I did personally review.  It shows that the previously placed aortoiliac stent graft is widely patent.  In the right lower extremity runoff into the external iliac and common femoral is patent and the profunda is also patent.  However just beyond " "its takeoff the right superficial femoral artery appears completely occluded and remained so until it reconstitutes at the level of the popliteal artery above the knee.  The tibial vessels appear diminutive but do show patency.      Review of Systems   Constitutional: Negative.  Negative for activity change, appetite change, chills, diaphoresis, fatigue and fever.   HENT: Negative.  Negative for congestion, sneezing, sore throat and trouble swallowing.    Eyes: Negative.  Negative for visual disturbance.   Respiratory: Negative.  Negative for chest tightness and shortness of breath.    Cardiovascular: Negative.  Negative for chest pain, palpitations and leg swelling.   Gastrointestinal: Negative.  Negative for abdominal distention, abdominal pain, nausea and vomiting.   Endocrine: Negative.    Genitourinary: Negative.    Musculoskeletal: Negative.         New right calf claudication for the last month.   Skin: Negative.    Allergic/Immunologic: Negative.    Neurological: Negative.    Hematological: Negative.    Psychiatric/Behavioral: Negative.        /86   Pulse 68   Ht 177.8 cm (70\")   Wt 86.2 kg (190 lb)   SpO2 98%   BMI 27.26 kg/m²   Physical Exam   Constitutional: He is oriented to person, place, and time. He appears well-developed and well-nourished.   HENT:   Head: Normocephalic and atraumatic.   Eyes: Pupils are equal, round, and reactive to light. EOM are normal.   Neck: Normal range of motion. Neck supple. No JVD present.   Cardiovascular: Normal rate and regular rhythm.   Pulses:       Carotid pulses are 2+ on the right side, and 2+ on the left side.       Radial pulses are 2+ on the right side, and 2+ on the left side.        Femoral pulses are 2+ on the right side, and 2+ on the left side.       Popliteal pulses are 0 on the right side, and 2+ on the left side.        Dorsalis pedis pulses are 0 on the right side, and 2+ on the left side.        Posterior tibial pulses are 0 on the right " side, and 2+ on the left side.   Bilateral groin incisions are well-healed.    He does have Doppler signals present at the right DP and PT although they are monophasic.   Pulmonary/Chest: Effort normal. No respiratory distress.   Abdominal: Soft. He exhibits no distension.   Musculoskeletal: Normal range of motion. He exhibits no edema, tenderness or deformity.   Neurological: He is alert and oriented to person, place, and time.   Skin: Skin is warm and dry. Less than 2 seconds on the left, greater than 3 seconds on the right..   Psychiatric: He has a normal mood and affect. His behavior is normal. Judgment and thought content normal.   Vitals reviewed.      DIAGNOSTIC DATA:    Ct Angio Abdominal Aorta Bilateral Iliofem Runoff    Result Date: 12/13/2019  Narrative: Exam: CT ANGIO ABDOMINAL AORTA BILAT ILIOFEM RUNOFF- -- 12/13/2019 11:20 AM CST  Indication: Right lower extremity ischemia, status post aortoiliac endograft  Comparison: 09/05/2019  DOSE LENGTH PRODUCT: 1554 mGy cm. Automated exposure control was also utilized to decrease patient radiation dose.  Findings:  The abdominal aorta is normal in caliber. The celiac trunk, SMA, and renal arteries are widely patent. The BROOKE is not well opacified and may be excluded. Postprocedural change of aortobiiliac stent grafting. The stented portion of the aorta is normal in caliber and the stent graft is widely patent. No endoleak or graft migration. The common iliac arteries and bilateral internal/external iliac arteries are widely patent.  Right lower extremity: The right common femoral artery is widely patent. The right profunda is widely patent. The right superficial femoral artery is completely occluded just beyond its origin with reconstitution distally just above the popliteal artery. Popliteal artery is slightly diminutive but widely patent. Normal right trifurcation. Three-vessel runoff appears maintained although dorsalis pedis appear slightly diminutive  compared to the left.  Left lower extremity: The left common femoral artery is widely patent. The left profunda and superficial femoral arteries are widely patent. Left popliteal artery is widely patent. Normal left-sided trifurcation. Three-vessel runoff on the left.  Soft tissue findings:  Lung bases are clear. Arterial phase liver, spleen, right adrenal gland, and pancreas appear unremarkable. No change in small left adrenal gland nodule measuring 1.2 cm, likely an adenoma. Gallbladder and biliary tree appear normal. No solid renal lesion. No urolithiasis or hydronephrosis. No focal urinary bladder abnormality. Prostate and seminal vesicles appear unremarkable.  No abnormal bowel distention or adjacent inflammation. Normal appendix. No ascites or free pelvic fluid. No pelvic mass organized pelvic collection. Prior bilateral inguinal cutdowns are noted. No enlarged retroperitoneal, mesenteric, pelvic, or inguinal lymph nodes. No suspicious focal bone lesion.      Impression: 1. Complete occlusion of the right superficial femoral artery just beyond its origin with reconstitution just above the popliteal artery. Three-vessel runoff is maintained on the right although dorsalis pedis appears slightly diminutive. 2. Three-vessel runoff on the left. 3. Aortobiiliac stent graft remains widely patent. No endoleak or graft migration. 4. Stable small 1.2 cm left adrenal gland nodule. This likely represents an adenoma.  --- These findings were discussed with ordering provider's nurse Arielle on 12/13/2019 1:00 PM CST by Dr. Leo Mercedes. This report was finalized on 12/13/2019 13:01 by Dr. Leo Mercedes MD.      Patient Active Problem List   Diagnosis   • Ischemic toe   • Thrombus of aorta (CMS/HCC)   • Tobacco abuse   • Mixed hyperlipidemia   • Blue toe syndrome (CMS/HCC)         ICD-10-CM ICD-9-CM   1. Atherosclerosis of native artery of right lower extremity with intermittent claudication (CMS/HCC) I70.211 440.21        Lab Frequency Next Occurrence   CT Angiogram Abdomen Pelvis With & Without Contrast Once 09/25/2020       PLAN: After thoroughly evaluating Fiona Laguerre, I believe the best course of action is to plan to proceed to the OR for right groin exploration and right lower extremity thromboembolectomy as well as probable angiogram with possible intervention on the right SFA.  As above I have reviewed the CTA that was done today and it shows complete occlusion of the right SFA from just after its takeoff with reconstitution at the level of the above-knee popliteal artery.  Given this finding along with the significant claudication that he is having he is indicated for revascularization. Risks of the procedure were discussed.  These include, but are not limited to, bleeding, infection, vessel damage, nerve damage, embolus, and loss of limb.  The patient understands these risks and wishes to proceed with procedure.  The patient is to continue taking their medications as previously discussed.   I did discuss vascular risk factors as they pertain to the progression of vascular disease including controlling hyperlipidemia. These factors remain stable. Patient's Body mass index is 27.26 kg/m². BMI is above normal parameters. Recommendations include: educational material. This was all discussed in full with complete understanding.  Thank you for allowing me to participate in the care of your patient.  Please do not hesitate to call with any questions or concerns.  We will keep you aware of any further encounters with Fiona Laguerre.      Sincerely Yours,      Karson Rivera MD

## 2019-12-13 NOTE — H&P (VIEW-ONLY)
HPI     I had the pleasure of seeing your patient in the office today for follow up.  As you recall, the patient is a 44 y.o. male who we are currently following for previous aortic thrombus.  He had undergone angiogram with placement of an aortobiiliac stent graft back in August.  He had been seen here 1 month postoperatively and at that time a CTA was done which showed excellent position of the stent graft which was widely patent and no further evidence of any thrombus.  He had palpable bilateral pedal pulses at that time with no claudication.  He was advised to continue on both aspirin and Plavix at that visit moving forward.  However he reports for about the last month he has been experiencing right lower extremity claudication mainly in the calf.  It is at fairly short distances at this point and is making it difficult for him to work.  About a week ago he reports he had some weakness to the right leg and had a fall and suffered some scratches to his face.  On further questioning he reports that about a month ago he did stop his Plavix for a week or 2 for unknown reasons but has now since restarted.  Otherwise he denies any ischemic rest pain.  After being seen in the office today he was sent for a CTA of the abdominal aorta with runoff which I did personally review.  It shows that the previously placed aortoiliac stent graft is widely patent.  In the right lower extremity runoff into the external iliac and common femoral is patent and the profunda is also patent.  However just beyond its takeoff the right superficial femoral artery appears completely occluded and remained so until it reconstitutes at the level of the popliteal artery above the knee.  The tibial vessels appear diminutive but do show patency.      Review of Systems   Constitutional: Negative.  Negative for activity change, appetite change, chills, diaphoresis, fatigue and fever.   HENT: Negative.  Negative for congestion, sneezing, sore throat  "and trouble swallowing.    Eyes: Negative.  Negative for visual disturbance.   Respiratory: Negative.  Negative for chest tightness and shortness of breath.    Cardiovascular: Negative.  Negative for chest pain, palpitations and leg swelling.   Gastrointestinal: Negative.  Negative for abdominal distention, abdominal pain, nausea and vomiting.   Endocrine: Negative.    Genitourinary: Negative.    Musculoskeletal: Negative.         New right calf claudication for the last month.   Skin: Negative.    Allergic/Immunologic: Negative.    Neurological: Negative.    Hematological: Negative.    Psychiatric/Behavioral: Negative.        /86   Pulse 68   Ht 177.8 cm (70\")   Wt 86.2 kg (190 lb)   SpO2 98%   BMI 27.26 kg/m²   Physical Exam   Constitutional: He is oriented to person, place, and time. He appears well-developed and well-nourished.   HENT:   Head: Normocephalic and atraumatic.   Eyes: Pupils are equal, round, and reactive to light. EOM are normal.   Neck: Normal range of motion. Neck supple. No JVD present.   Cardiovascular: Normal rate and regular rhythm.   Pulses:       Carotid pulses are 2+ on the right side, and 2+ on the left side.       Radial pulses are 2+ on the right side, and 2+ on the left side.        Femoral pulses are 2+ on the right side, and 2+ on the left side.       Popliteal pulses are 0 on the right side, and 2+ on the left side.        Dorsalis pedis pulses are 0 on the right side, and 2+ on the left side.        Posterior tibial pulses are 0 on the right side, and 2+ on the left side.   Bilateral groin incisions are well-healed.    He does have Doppler signals present at the right DP and PT although they are monophasic.   Pulmonary/Chest: Effort normal. No respiratory distress.   Abdominal: Soft. He exhibits no distension.   Musculoskeletal: Normal range of motion. He exhibits no edema, tenderness or deformity.   Neurological: He is alert and oriented to person, place, and time. "   Skin: Skin is warm and dry. Less than 2 seconds on the left, greater than 3 seconds on the right..   Psychiatric: He has a normal mood and affect. His behavior is normal. Judgment and thought content normal.   Vitals reviewed.      DIAGNOSTIC DATA:    Ct Angio Abdominal Aorta Bilateral Iliofem Runoff    Result Date: 12/13/2019  Narrative: Exam: CT ANGIO ABDOMINAL AORTA BILAT ILIOFEM RUNOFF- -- 12/13/2019 11:20 AM CST  Indication: Right lower extremity ischemia, status post aortoiliac endograft  Comparison: 09/05/2019  DOSE LENGTH PRODUCT: 1554 mGy cm. Automated exposure control was also utilized to decrease patient radiation dose.  Findings:  The abdominal aorta is normal in caliber. The celiac trunk, SMA, and renal arteries are widely patent. The BROOKE is not well opacified and may be excluded. Postprocedural change of aortobiiliac stent grafting. The stented portion of the aorta is normal in caliber and the stent graft is widely patent. No endoleak or graft migration. The common iliac arteries and bilateral internal/external iliac arteries are widely patent.  Right lower extremity: The right common femoral artery is widely patent. The right profunda is widely patent. The right superficial femoral artery is completely occluded just beyond its origin with reconstitution distally just above the popliteal artery. Popliteal artery is slightly diminutive but widely patent. Normal right trifurcation. Three-vessel runoff appears maintained although dorsalis pedis appear slightly diminutive compared to the left.  Left lower extremity: The left common femoral artery is widely patent. The left profunda and superficial femoral arteries are widely patent. Left popliteal artery is widely patent. Normal left-sided trifurcation. Three-vessel runoff on the left.  Soft tissue findings:  Lung bases are clear. Arterial phase liver, spleen, right adrenal gland, and pancreas appear unremarkable. No change in small left adrenal gland  nodule measuring 1.2 cm, likely an adenoma. Gallbladder and biliary tree appear normal. No solid renal lesion. No urolithiasis or hydronephrosis. No focal urinary bladder abnormality. Prostate and seminal vesicles appear unremarkable.  No abnormal bowel distention or adjacent inflammation. Normal appendix. No ascites or free pelvic fluid. No pelvic mass organized pelvic collection. Prior bilateral inguinal cutdowns are noted. No enlarged retroperitoneal, mesenteric, pelvic, or inguinal lymph nodes. No suspicious focal bone lesion.      Impression: 1. Complete occlusion of the right superficial femoral artery just beyond its origin with reconstitution just above the popliteal artery. Three-vessel runoff is maintained on the right although dorsalis pedis appears slightly diminutive. 2. Three-vessel runoff on the left. 3. Aortobiiliac stent graft remains widely patent. No endoleak or graft migration. 4. Stable small 1.2 cm left adrenal gland nodule. This likely represents an adenoma.  --- These findings were discussed with ordering provider's nurse Arielle on 12/13/2019 1:00 PM CST by Dr. Leo Mercedes. This report was finalized on 12/13/2019 13:01 by Dr. Leo Mercedes MD.      Patient Active Problem List   Diagnosis   • Ischemic toe   • Thrombus of aorta (CMS/HCC)   • Tobacco abuse   • Mixed hyperlipidemia   • Blue toe syndrome (CMS/HCC)         ICD-10-CM ICD-9-CM   1. Atherosclerosis of native artery of right lower extremity with intermittent claudication (CMS/HCC) I70.211 440.21       Lab Frequency Next Occurrence   CT Angiogram Abdomen Pelvis With & Without Contrast Once 09/25/2020       PLAN: After thoroughly evaluating Fiona Laguerre, I believe the best course of action is to plan to proceed to the OR for right groin exploration and right lower extremity thromboembolectomy as well as probable angiogram with possible intervention on the right SFA.  As above I have reviewed the CTA that was done today and it shows  complete occlusion of the right SFA from just after its takeoff with reconstitution at the level of the above-knee popliteal artery.  Given this finding along with the significant claudication that he is having he is indicated for revascularization. Risks of the procedure were discussed.  These include, but are not limited to, bleeding, infection, vessel damage, nerve damage, embolus, and loss of limb.  The patient understands these risks and wishes to proceed with procedure.  The patient is to continue taking their medications as previously discussed.   I did discuss vascular risk factors as they pertain to the progression of vascular disease including controlling hyperlipidemia. These factors remain stable. Patient's Body mass index is 27.26 kg/m². BMI is above normal parameters. Recommendations include: educational material. This was all discussed in full with complete understanding.

## 2019-12-16 ENCOUNTER — TELEPHONE (OUTPATIENT)
Dept: VASCULAR SURGERY | Facility: CLINIC | Age: 44
End: 2019-12-16

## 2019-12-16 PROBLEM — I70.211 ATHEROSCLEROSIS OF NATIVE ARTERY OF RIGHT LOWER EXTREMITY WITH INTERMITTENT CLAUDICATION: Status: ACTIVE | Noted: 2019-12-16

## 2019-12-16 NOTE — TELEPHONE ENCOUNTER
Spoke with patient and advised of upcoming procedure.  Patient pre work is scheduled for 12/17/2019 at 1245 pm.  Patient procedure is scheduled for 12/19/2019 at 800 am.  Patient advised of location time and prep.  Patient expressed understanding for all that was discussed.

## 2019-12-16 NOTE — TELEPHONE ENCOUNTER
Spoke with patient after confirming with Dr. Rivera.  Patient needs to hold Plavix for 3 days prior to his procedure.  Patient advised and expressed understanding.  He had not taken today and will hold.

## 2019-12-17 ENCOUNTER — APPOINTMENT (OUTPATIENT)
Dept: PREADMISSION TESTING | Facility: HOSPITAL | Age: 44
End: 2019-12-17

## 2019-12-17 VITALS
DIASTOLIC BLOOD PRESSURE: 71 MMHG | HEIGHT: 70 IN | BODY MASS INDEX: 27.17 KG/M2 | WEIGHT: 189.82 LBS | SYSTOLIC BLOOD PRESSURE: 128 MMHG | OXYGEN SATURATION: 96 % | HEART RATE: 71 BPM

## 2019-12-17 DIAGNOSIS — I70.211 ATHEROSCLEROSIS OF NATIVE ARTERY OF RIGHT LOWER EXTREMITY WITH INTERMITTENT CLAUDICATION (HCC): ICD-10-CM

## 2019-12-17 LAB
ANION GAP SERPL CALCULATED.3IONS-SCNC: 10 MMOL/L (ref 5–15)
BASOPHILS # BLD AUTO: 0.07 10*3/MM3 (ref 0–0.2)
BASOPHILS NFR BLD AUTO: 0.9 % (ref 0–1.5)
BUN BLD-MCNC: 7 MG/DL (ref 6–20)
BUN/CREAT SERPL: 7.6 (ref 7–25)
CALCIUM SPEC-SCNC: 9.4 MG/DL (ref 8.6–10.5)
CHLORIDE SERPL-SCNC: 102 MMOL/L (ref 98–107)
CO2 SERPL-SCNC: 27 MMOL/L (ref 22–29)
CREAT BLD-MCNC: 0.92 MG/DL (ref 0.76–1.27)
DEPRECATED RDW RBC AUTO: 46.1 FL (ref 37–54)
EOSINOPHIL # BLD AUTO: 0.26 10*3/MM3 (ref 0–0.4)
EOSINOPHIL NFR BLD AUTO: 3.2 % (ref 0.3–6.2)
ERYTHROCYTE [DISTWIDTH] IN BLOOD BY AUTOMATED COUNT: 14.2 % (ref 12.3–15.4)
GFR SERPL CREATININE-BSD FRML MDRD: 89 ML/MIN/1.73
GLUCOSE BLD-MCNC: 97 MG/DL (ref 65–99)
HCT VFR BLD AUTO: 49.1 % (ref 37.5–51)
HGB BLD-MCNC: 16.2 G/DL (ref 13–17.7)
IMM GRANULOCYTES # BLD AUTO: 0.04 10*3/MM3 (ref 0–0.05)
IMM GRANULOCYTES NFR BLD AUTO: 0.5 % (ref 0–0.5)
INR PPP: 0.91 (ref 0.91–1.09)
LYMPHOCYTES # BLD AUTO: 3.16 10*3/MM3 (ref 0.7–3.1)
LYMPHOCYTES NFR BLD AUTO: 38.4 % (ref 19.6–45.3)
MCH RBC QN AUTO: 29.2 PG (ref 26.6–33)
MCHC RBC AUTO-ENTMCNC: 33 G/DL (ref 31.5–35.7)
MCV RBC AUTO: 88.5 FL (ref 79–97)
MONOCYTES # BLD AUTO: 0.69 10*3/MM3 (ref 0.1–0.9)
MONOCYTES NFR BLD AUTO: 8.4 % (ref 5–12)
NEUTROPHILS # BLD AUTO: 4 10*3/MM3 (ref 1.7–7)
NEUTROPHILS NFR BLD AUTO: 48.6 % (ref 42.7–76)
NRBC BLD AUTO-RTO: 0 /100 WBC (ref 0–0.2)
PLATELET # BLD AUTO: 255 10*3/MM3 (ref 140–450)
PMV BLD AUTO: 10.1 FL (ref 6–12)
POTASSIUM BLD-SCNC: 3.8 MMOL/L (ref 3.5–5.2)
PROTHROMBIN TIME: 12.5 SECONDS (ref 11.9–14.6)
RBC # BLD AUTO: 5.55 10*6/MM3 (ref 4.14–5.8)
SODIUM BLD-SCNC: 139 MMOL/L (ref 136–145)
WBC NRBC COR # BLD: 8.22 10*3/MM3 (ref 3.4–10.8)

## 2019-12-17 PROCEDURE — 93005 ELECTROCARDIOGRAM TRACING: CPT

## 2019-12-17 PROCEDURE — 36415 COLL VENOUS BLD VENIPUNCTURE: CPT

## 2019-12-17 PROCEDURE — 93010 ELECTROCARDIOGRAM REPORT: CPT | Performed by: INTERNAL MEDICINE

## 2019-12-17 PROCEDURE — 85610 PROTHROMBIN TIME: CPT | Performed by: SURGERY

## 2019-12-17 PROCEDURE — 85025 COMPLETE CBC W/AUTO DIFF WBC: CPT | Performed by: SURGERY

## 2019-12-17 PROCEDURE — 80048 BASIC METABOLIC PNL TOTAL CA: CPT | Performed by: SURGERY

## 2019-12-17 NOTE — DISCHARGE INSTRUCTIONS
DAY OF SURGERY INSTRUCTIONS        YOUR SURGEON: ***    PROCEDURE: ***RIGHT LOWER EXTREMITY FEMORAL THROMBECTOMY/EMBOLECTOMY, POSSIBLE ANGIOGRAM, POSSIBLE INTERVENTION    DATE OF SURGERY: ***12/19/19    ARRIVAL TIME: AS DIRECTED BY OFFICE    YOU MAY TAKE THE FOLLOWING MEDICATION(S) THE MORNING OF SURGERY WITH A SIP OF WATER: ***as directed by your doctor      ALL OTHER HOME MEDICATION CHECK WITH YOUR PHYSICIAN                MANAGING PAIN AFTER SURGERY    We know you are probably wondering what your pain will be like after surgery.  Following surgery it is unrealistic to expect you will not have pain.   Pain is how our bodies let us know that something is wrong or cautions us to be careful.  That said, our goal is to make your pain tolerable.    Methods we may use to treat your pain include (oral or IV medications, PCAs, epidurals, nerve blocks, etc.)   While some procedures require IV pain medications for a short time after surgery, transitioning to pain medications by mouth allows for better management of pain.   Your nurse will encourage you to take oral pain medications whenever possible.  IV medications work almost immediately, but only last a short while.  Taking medications by mouth allows for a more constant level of medication in your blood stream for a longer period of time.      Once your pain is out of control it is harder to get back under control.  It is important you are aware when your next dose of pain medication is due.  If you are admitted, your nurse may write the time of your next dose on the white board in your room to help you remember.      We are interested in your pain and encourage you to inform us about aggravating factors during your visit.   Many times a simple repositioning every few hours can make a big difference.    If your physician says it is okay, do not let your pain prevent you from getting out of bed. Be sure to call your nurse for assistance prior to getting up so  you do not fall.      Before surgery, please decide your tolerable pain goal.  These faces help describe the pain ratings we use on a 0-10 scale.   Be prepared to tell us your goal and whether or not you take pain or anxiety medications at home.          BEFORE YOU COME TO THE HOSPITAL  (Pre-op instructions)  • Do not eat, drink, smoke or chew gum after midnight the night before surgery.  This also includes no mints.  • Morning of surgery take only the medicines you have been instructed with a sip of water unless otherwise instructed  by your physician.  • Do not shave, wear makeup or dark nail polish.  • Remove all jewelry including rings.  • Leave anything you consider valuable at home.  • Leave your suitcase in the car until after your surgery.  • Bring the following with you if applicable:  o Picture ID and insurance, Medicare or Medicaid cards  o Co-pay/deductible required by insurance (cash, check, credit card)  o Copy of advance directive, living will or power-of- documents if not brought to PAT  o CPAP or BIPAP mask and tubing  o Relaxation aids ( book, magazine), etc.  o Hearing aids                        ON THE DAY OF SURGERY  · On the day of surgery check in at registration located at the main entrance of the hospital.   ? You will be registered and given a beeper with instructions where to wait in the main lobby.  ? When your beeper lights up and vibrates a member of the Outpatient Surgery staff will meet you at the double doors under the stair steps and escort you to your preoperative room.   · You may have cloth compression devices placed on your legs. These help to prevent blood clots and reduce swelling in your legs.  · An IV may be inserted into one of your veins.  · In the operating room, you may be given one or more of the following:  ? A medicine to help you relax (sedative).  ? A medicine to numb the area (local anesthetic).  ? A medicine to make you fall asleep (general  "anesthetic).  ? A medicine that is injected into an area of your body to numb everything below the injection site (regional anesthetic).  · Your surgical site will be marked or identified.  · You may be given an antibiotic through your IV to help prevent infection.  Contact a health care provider if you:  · Develop a fever of more than 100.4°F (38°C) or other feelings of illness during the 48 hours before your surgery.  · Have symptoms that get worse.  Have questions or concerns about your surgery    General Anesthesia/Surgery, Adult  General anesthesia is the use of medicines to make a person \"go to sleep\" (unconscious) for a medical procedure. General anesthesia must be used for certain procedures, and is often recommended for procedures that:  · Last a long time.  · Require you to be still or in an unusual position.  · Are major and can cause blood loss.  The medicines used for general anesthesia are called general anesthetics. As well as making you unconscious for a certain amount of time, these medicines:  · Prevent pain.  · Control your blood pressure.  · Relax your muscles.  Tell a health care provider about:  · Any allergies you have.  · All medicines you are taking, including vitamins, herbs, eye drops, creams, and over-the-counter medicines.  · Any problems you or family members have had with anesthetic medicines.  · Types of anesthetics you have had in the past.  · Any blood disorders you have.  · Any surgeries you have had.  · Any medical conditions you have.  · Any recent upper respiratory, chest, or ear infections.  · Any history of:  ? Heart or lung conditions, such as heart failure, sleep apnea, asthma, or chronic obstructive pulmonary disease (COPD).  ?  service.  ? Depression or anxiety.  · Any tobacco or drug use, including marijuana or alcohol use.  · Whether you are pregnant or may be pregnant.  What are the risks?  Generally, this is a safe procedure. However, problems may occur, " including:  · Allergic reaction.  · Lung and heart problems.  · Inhaling food or liquid from the stomach into the lungs (aspiration).  · Nerve injury.  · Air in the bloodstream, which can lead to stroke.  · Extreme agitation or confusion (delirium) when you wake up from the anesthetic.  · Waking up during your procedure and being unable to move. This is rare.  These problems are more likely to develop if you are having a major surgery or if you have an advanced or serious medical condition. You can prevent some of these complications by answering all of your health care provider's questions thoroughly and by following all instructions before your procedure.  General anesthesia can cause side effects, including:  · Nausea or vomiting.  · A sore throat from the breathing tube.  · Hoarseness.  · Wheezing or coughing.  · Shaking chills.  · Tiredness.  · Body aches.  · Anxiety.  · Sleepiness or drowsiness.  · Confusion or agitation.  RISKS AND COMPLICATIONS OF SURGERY  Your health care provider will discuss possible risks and complications with you before surgery. Common risks and complications include:    · Problems due to the use of anesthetics.  · Blood loss and replacement (does not apply to minor surgical procedures).  · Temporary increase in pain due to surgery.  · Uncorrected pain or problems that the surgery was meant to correct.  · Infection.  · New damage.    What happens before the procedure?    Medicines  Ask your health care provider about:  · Changing or stopping your regular medicines. This is especially important if you are taking diabetes medicines or blood thinners.  · Taking medicines such as aspirin and ibuprofen. These medicines can thin your blood. Do not take these medicines unless your health care provider tells you to take them.  · Taking over-the-counter medicines, vitamins, herbs, and supplements. Do not take these during the week before your procedure unless your health care provider approves  them.  General instructions  · Starting 3-6 weeks before the procedure, do not use any products that contain nicotine or tobacco, such as cigarettes and e-cigarettes. If you need help quitting, ask your health care provider.  · If you brush your teeth on the morning of the procedure, make sure to spit out all of the toothpaste.  · Tell your health care provider if you become ill or develop a cold, cough, or fever.  · If instructed by your health care provider, bring your sleep apnea device with you on the day of your surgery (if applicable).  · Ask your health care provider if you will be going home the same day, the following day, or after a longer hospital stay.  ? Plan to have someone take you home from the hospital or clinic.  ? Plan to have a responsible adult care for you for at least 24 hours after you leave the hospital or clinic. This is important.  What happens during the procedure?  · You will be given anesthetics through both of the following:  ? A mask placed over your nose and mouth.  ? An IV in one of your veins.  · You may receive a medicine to help you relax (sedative).  · After you are unconscious, a breathing tube may be inserted down your throat to help you breathe. This will be removed before you wake up.  · An anesthesia specialist will stay with you throughout your procedure. He or she will:  ? Keep you comfortable and safe by continuing to give you medicines and adjusting the amount of medicine that you get.  ? Monitor your blood pressure, pulse, and oxygen levels to make sure that the anesthetics do not cause any problems.  The procedure may vary among health care providers and hospitals.  What happens after the procedure?  · Your blood pressure, temperature, heart rate, breathing rate, and blood oxygen level will be monitored until the medicines you were given have worn off.  · You will wake up in a recovery area. You may wake up slowly.  · If you feel anxious or agitated, you may be given  medicine to help you calm down.  · If you will be going home the same day, your health care provider may check to make sure you can walk, drink, and urinate.  · Your health care provider will treat any pain or side effects you have before you go home.  · Do not drive for 24 hours if you were given a sedative.  Summary  · General anesthesia is used to keep you still and prevent pain during a procedure.  · It is important to tell your healthcare provider about your medical history and any surgeries you have had, and previous experience with anesthesia.  · Follow your healthcare provider’s instructions about when to stop eating, drinking, or taking certain medicines before your procedure.  · Plan to have someone take you home from the hospital or clinic.  This information is not intended to replace advice given to you by your health care provider. Make sure you discuss any questions you have with your health care provider.  Document Released: 03/26/2009 Document Revised: 08/03/2018 Document Reviewed: 08/03/2018  [employer] Interactive Patient Education © 2019 [employer]       Fall Prevention in Hospitals, Adult  As a hospital patient, your condition and the treatments you receive can increase your risk for falls. Some additional risk factors for falls in a hospital include:  · Being in an unfamiliar environment.  · Being on bed rest.  · Your surgery.  · Taking certain medicines.  · Your tubing requirements, such as intravenous (IV) therapy or catheters.  It is important that you learn how to decrease fall risks while at the hospital. Below are important tips that can help prevent falls.  SAFETY TIPS FOR PREVENTING FALLS  Talk about your risk of falling.  · Ask your health care provider why you are at risk for falling. Is it your medicine, illness, tubing placement, or something else?  · Make a plan with your health care provider to keep you safe from falls.  · Ask your health care provider or pharmacist about side  effects of your medicines. Some medicines can make you dizzy or affect your coordination.  Ask for help.  · Ask for help before getting out of bed. You may need to press your call button.  · Ask for assistance in getting safely to the toilet.  · Ask for a walker or cane to be put at your bedside. Ask that most of the side rails on your bed be placed up before your health care provider leaves the room.  · Ask family or friends to sit with you.  · Ask for things that are out of your reach, such as your glasses, hearing aids, telephone, bedside table, or call button.  Follow these tips to avoid falling:  · Stay lying or seated, rather than standing, while waiting for help.  · Wear rubber-soled slippers or shoes whenever you walk in the hospital.  · Avoid quick, sudden movements.  ¨ Change positions slowly.  ¨ Sit on the side of your bed before standing.  ¨ Stand up slowly and wait before you start to walk.  · Let your health care provider know if there is a spill on the floor.  · Pay careful attention to the medical equipment, electrical cords, and tubes around you.  · When you need help, use your call button by your bed or in the bathroom. Wait for one of your health care providers to help you.  · If you feel dizzy or unsure of your footing, return to bed and wait for assistance.  · Avoid being distracted by the TV, telephone, or another person in your room.  · Do not lean or support yourself on rolling objects, such as IV poles or bedside tables.     This information is not intended to replace advice given to you by your health care provider. Make sure you discuss any questions you have with your health care provider.     Document Released: 12/15/2001 Document Revised: 01/08/2016 Document Reviewed: 08/25/2013  [employer] Interactive Patient Education ©2016 [employer]       Surgical Site Infections FAQs  What is a Surgical Site Infection (SSI)?  A surgical site infection is an infection that occurs after surgery in  the part of the body where the surgery took place. Most patients who have surgery do not develop an infection. However, infections develop in about 1 to 3 out of every 100 patients who have surgery.  Some of the common symptoms of a surgical site infection are:  · Redness and pain around the area where you had surgery  · Drainage of cloudy fluid from your surgical wound  · Fever  Can SSIs be treated?  Yes. Most surgical site infections can be treated with antibiotics. The antibiotic given to you depends on the bacteria (germs) causing the infection. Sometimes patients with SSIs also need another surgery to treat the infection.  What are some of the things that hospitals are doing to prevent SSIs?  To prevent SSIs, doctors, nurses, and other healthcare providers:  · Clean their hands and arms up to their elbows with an antiseptic agent just before the surgery.  · Clean their hands with soap and water or an alcohol-based hand rub before and after caring for each patient.  · May remove some of your hair immediately before your surgery using electric clippers if the hair is in the same area where the procedure will occur. They should not shave you with a razor.  · Wear special hair covers, masks, gowns, and gloves during surgery to keep the surgery area clean.  · Give you antibiotics before your surgery starts. In most cases, you should get antibiotics within 60 minutes before the surgery starts and the antibiotics should be stopped within 24 hours after surgery.  · Clean the skin at the site of your surgery with a special soap that kills germs.  What can I do to help prevent SSIs?  Before your surgery:  · Tell your doctor about other medical problems you may have. Health problems such as allergies, diabetes, and obesity could affect your surgery and your treatment.  · Quit smoking. Patients who smoke get more infections. Talk to your doctor about how you can quit before your surgery.  · Do not shave near where you will  have surgery. Shaving with a razor can irritate your skin and make it easier to develop an infection.  At the time of your surgery:  · Speak up if someone tries to shave you with a razor before surgery. Ask why you need to be shaved and talk with your surgeon if you have any concerns.  · Ask if you will get antibiotics before surgery.  After your surgery:  · Make sure that your healthcare providers clean their hands before examining you, either with soap and water or an alcohol-based hand rub.  · If you do not see your providers clean their hands, please ask them to do so.  · Family and friends who visit you should not touch the surgical wound or dressings.  · Family and friends should clean their hands with soap and water or an alcohol-based hand rub before and after visiting you. If you do not see them clean their hands, ask them to clean their hands.  What do I need to do when I go home from the hospital?  · Before you go home, your doctor or nurse should explain everything you need to know about taking care of your wound. Make sure you understand how to care for your wound before you leave the hospital.  · Always clean your hands before and after caring for your wound.  · Before you go home, make sure you know who to contact if you have questions or problems after you get home.  · If you have any symptoms of an infection, such as redness and pain at the surgery site, drainage, or fever, call your doctor immediately.  If you have additional questions, please ask your doctor or nurse.  Developed and co-sponsored by The Society for Healthcare Epidemiology of [place] (SHEA); Infectious Diseases Society of [place] (IDSA); American Hospital Association; Association for Professionals in Infection Control and Epidemiology (APIC); Centers for Disease Control and Prevention (CDC); and The Joint Commission.     This information is not intended to replace advice given to you by your health care provider. Make sure you  discuss any questions you have with your health care provider.     Document Released: 12/23/2014 Document Revised: 01/08/2016 Document Reviewed: 03/02/2016  [employer] Interactive Patient Education ©2016 [employer]           [facility]  CHG 4% Patient Instruction Sheet    Chlorhexidine Before Surgery  Chlorhexidine gluconate (CHG) is a germ-killing (antiseptic) solution that is used to clean the skin. It gets rid of the bacteria that normally live on the skin. Cleaning your skin with CHG before surgery helps lower the risk for infection after surgery.    How to use CHG solution  · You will take 2 showers, one shower the night before surgery, the second shower the morning of surgery before coming to the hospital.  · Use CHG only as told by your health care provider, and follow the instructions on the label.  · Use CHG solution while taking a shower. Follow these steps when using CHG solution (unless your health care provider gives you different instructions):  1. Start the shower.  2. Use your normal soap and shampoo to wash your face and hair.  3. Turn off the shower or move out of the shower stream.  4. Pour the CHG onto a clean washcloth. Do not use any type of brush or rough-edged sponge.  5. Starting at your neck, lather your body down to your toes. Make sure you:  6. Pay special attention to the part of your body where you will be having surgery. Scrub this area for at least 1 minute.  7. Use the full amount of CHG as directed. Usually, this is one half bottle for each shower.  8. Do not use CHG on your head or face. If the solution gets into your ears or eyes, rinse them well with water.  9. Avoid your genital area.  10. Avoid any areas of skin that have broken skin, cuts, or scrapes.  11. Scrub your back and under your arms. Make sure to wash skin folds.  12. Let the lather sit on your skin for 1-2 minutes or as long as told by your health care  provider.  13. Thoroughly rinse your entire body in  the shower. Make sure that all body creases and crevices are rinsed well.  14. Dry off with a clean towel. Do not put any substances on your body afterward, such as powder, lotion, or perfume.  15. Put on clean clothes or pajamas.  16. If it is the night before your surgery, sleep in clean sheets.    What are the risks?  Risks of using CHG include:  · A skin reaction.  · Hearing loss, if CHG gets in your ears.  · Eye injury, if CHG gets in your eyes and is not rinsed out.  · The CHG product catching fire.  Make sure that you avoid smoking and flames after applying CHG to your skin.  Do not use CHG:  · If you have a chlorhexidine allergy or have previously reacted to chlorhexidine.  · On babies younger than 2 months of age.      On the day of surgery, when you are taken to your room in Outpatient Surgery you will be given a CHG prepackaged cloth to wipe the site for your surgery.  How to use CHG prepackaged cloths  · Follow the instructions on the label.  · Use the CHG cloth on clean, dry skin. Follow these steps when using a CHG cloth (unless your health care provider gives you different instructions):  1. Using the CHG cloth, vigorously scrub the part of your body where you will be having surgery. Scrub using a back-and-forth motion for 3 minutes. The area on your body should be completely wet with CHG when you are finished scrubbing.  2. Do not rinse. Discard the cloth and let the area air-dry for 1 minute. Do not put any substances on your body afterward, such as powder, lotion, or perfume.  Contact a health care provider if:  · Your skin gets irritated after scrubbing.  · You have questions about using your solution or cloth.  Get help right away if:  · Your eyes become very red or swollen.  · Your eyes itch badly.  · Your skin itches badly and is red or swollen.  · Your hearing changes.  · You have trouble seeing.  · You have swelling or tingling in your mouth or throat.  · You have trouble breathing.  · You  swallow any chlorhexidine.  Summary  · Chlorhexidine gluconate (CHG) is a germ-killing (antiseptic) solution that is used to clean the skin. Cleaning your skin with CHG before surgery helps lower the risk for infection after surgery.  · You may be given CHG to use at home. It may be in a bottle or in a prepackaged cloth to use on your skin. Carefully follow your health care provider's instructions and the instructions on the product label.  · Do not use CHG if you have a chlorhexidine allergy.  · Contact your health care provider if your skin gets irritated after scrubbing.  This information is not intended to replace advice given to you by your health care provider. Make sure you discuss any questions you have with your health care provider.  Document Released: 09/11/2013 Document Revised: 11/15/2018 Document Reviewed: 11/15/2018  [employer] Interactive Patient Education © 2019 [employer]          PATIENT/FAMILY/RESPONSIBLE PARTY VERBALIZES UNDERSTANDING OF ABOVE EDUCATION.  COPY OF PAIN SCALE GIVEN AND REVIEWED WITH VERBALIZED UNDERSTANDING.

## 2019-12-18 ENCOUNTER — TELEPHONE (OUTPATIENT)
Dept: VASCULAR SURGERY | Facility: CLINIC | Age: 44
End: 2019-12-18

## 2019-12-19 ENCOUNTER — HOSPITAL ENCOUNTER (INPATIENT)
Facility: HOSPITAL | Age: 44
LOS: 2 days | Discharge: HOME | End: 2019-12-21
Attending: SURGERY | Admitting: SURGERY

## 2019-12-19 ENCOUNTER — ANESTHESIA (OUTPATIENT)
Dept: PERIOP | Facility: HOSPITAL | Age: 44
End: 2019-12-19

## 2019-12-19 ENCOUNTER — ANESTHESIA EVENT (OUTPATIENT)
Dept: PERIOP | Facility: HOSPITAL | Age: 44
End: 2019-12-19

## 2019-12-19 ENCOUNTER — APPOINTMENT (OUTPATIENT)
Dept: INTERVENTIONAL RADIOLOGY/VASCULAR | Facility: HOSPITAL | Age: 44
End: 2019-12-19

## 2019-12-19 DIAGNOSIS — I70.211 ATHEROSCLEROSIS OF NATIVE ARTERY OF RIGHT LOWER EXTREMITY WITH INTERMITTENT CLAUDICATION (HCC): ICD-10-CM

## 2019-12-19 LAB
A-A DO2: 221.2 MMHG
ABO GROUP BLD: NORMAL
APTT PPP: 74.9 SECONDS (ref 24.1–35)
ARTERIAL PATENCY WRIST A: ABNORMAL
ATMOSPHERIC PRESS: 761 MMHG
BASE EXCESS BLDA CALC-SCNC: -3.5 MMOL/L (ref 0–2)
BDY SITE: ABNORMAL
BLD GP AB SCN SERPL QL: NEGATIVE
BODY TEMPERATURE: 37 C
CA-I BLD-MCNC: 4.51 MG/DL (ref 4.6–5.4)
COHGB MFR BLD: 1.3 % (ref 0–5)
HCO3 BLDA-SCNC: 22.3 MMOL/L (ref 20–26)
HCT VFR BLD CALC: 44.3 % (ref 38–51)
HGB BLDA-MCNC: 14.5 G/DL (ref 14–18)
HOROWITZ INDEX BLD+IHG-RTO: 100 %
Lab: ABNORMAL
METHGB BLD QL: 1.1 % (ref 0–3)
MODALITY: ABNORMAL
NOTE: ABNORMAL
OXYHGB MFR BLDV: 97.6 % (ref 94–99)
PCO2 BLDA: 42.1 MM HG (ref 35–45)
PCO2 TEMP ADJ BLD: ABNORMAL MM[HG]
PH BLDA: 7.33 PH UNITS (ref 7.35–7.45)
PH, TEMP CORRECTED: ABNORMAL
PO2 BLDA: 451 MM HG (ref 83–108)
PO2 TEMP ADJ BLD: ABNORMAL MM[HG]
POTASSIUM BLDA-SCNC: 5.2 MMOL/L (ref 3.5–5.2)
RH BLD: POSITIVE
SAO2 % BLDCOA: 100 % (ref 94–99)
SODIUM BLDA-SCNC: 138 MMOL/L (ref 136–145)
T&S EXPIRATION DATE: NORMAL
VENTILATOR MODE: ABNORMAL

## 2019-12-19 PROCEDURE — C1757 CATH, THROMBECTOMY/EMBOLECT: HCPCS | Performed by: SURGERY

## 2019-12-19 PROCEDURE — 75710 ARTERY X-RAYS ARM/LEG: CPT

## 2019-12-19 PROCEDURE — 25010000002 ARGATROBAN PER 5 MG: Performed by: NURSE ANESTHETIST, CERTIFIED REGISTERED

## 2019-12-19 PROCEDURE — 86900 BLOOD TYPING SEROLOGIC ABO: CPT | Performed by: SURGERY

## 2019-12-19 PROCEDURE — 94799 UNLISTED PULMONARY SVC/PX: CPT

## 2019-12-19 PROCEDURE — C1887 CATHETER, GUIDING: HCPCS | Performed by: SURGERY

## 2019-12-19 PROCEDURE — C1769 GUIDE WIRE: HCPCS | Performed by: SURGERY

## 2019-12-19 PROCEDURE — 25010000002 ONDANSETRON PER 1 MG: Performed by: NURSE ANESTHETIST, CERTIFIED REGISTERED

## 2019-12-19 PROCEDURE — 82805 BLOOD GASES W/O2 SATURATION: CPT

## 2019-12-19 PROCEDURE — X27M385 DILATION OF DISTAL RIGHT POPLITEAL ARTERY WITH SUSTAINED RELEASE DRUG-ELUTING INTRALUMINAL DEVICE, PERCUTANEOUS APPROACH, NEW TECHNOLOGY GROUP 5: ICD-10-PCS | Performed by: SURGERY

## 2019-12-19 PROCEDURE — 25010000002 PHENYLEPHRINE 10 MG/ML SOLUTION 5 ML VIAL: Performed by: NURSE ANESTHETIST, CERTIFIED REGISTERED

## 2019-12-19 PROCEDURE — 04CK3ZZ EXTIRPATION OF MATTER FROM RIGHT FEMORAL ARTERY, PERCUTANEOUS APPROACH: ICD-10-PCS | Performed by: SURGERY

## 2019-12-19 PROCEDURE — 83050 HGB METHEMOGLOBIN QUAN: CPT

## 2019-12-19 PROCEDURE — 25010000002 IOPAMIDOL 61 % SOLUTION: Performed by: SURGERY

## 2019-12-19 PROCEDURE — C2623 CATH, TRANSLUMIN, DRUG-COAT: HCPCS | Performed by: SURGERY

## 2019-12-19 PROCEDURE — 25010000002 MIDAZOLAM PER 1 MG: Performed by: ANESTHESIOLOGY

## 2019-12-19 PROCEDURE — C1894 INTRO/SHEATH, NON-LASER: HCPCS | Performed by: SURGERY

## 2019-12-19 PROCEDURE — 25010000002 FENTANYL CITRATE (PF) 100 MCG/2ML SOLUTION: Performed by: NURSE ANESTHETIST, CERTIFIED REGISTERED

## 2019-12-19 PROCEDURE — 25010000002 HEPARIN (PORCINE) PER 1000 UNITS: Performed by: NURSE ANESTHETIST, CERTIFIED REGISTERED

## 2019-12-19 PROCEDURE — C1760 CLOSURE DEV, VASC: HCPCS | Performed by: SURGERY

## 2019-12-19 PROCEDURE — C1876 STENT, NON-COA/NON-COV W/DEL: HCPCS | Performed by: SURGERY

## 2019-12-19 PROCEDURE — 86901 BLOOD TYPING SEROLOGIC RH(D): CPT | Performed by: SURGERY

## 2019-12-19 PROCEDURE — 82375 ASSAY CARBOXYHB QUANT: CPT

## 2019-12-19 PROCEDURE — 25010000002 HEPARIN (PORCINE) PER 1000 UNITS: Performed by: SURGERY

## 2019-12-19 PROCEDURE — 25010000002 BIVALIRUDIN 5 MG/ML: Performed by: SURGERY

## 2019-12-19 PROCEDURE — 25010000002 DEXAMETHASONE PER 1 MG: Performed by: NURSE ANESTHETIST, CERTIFIED REGISTERED

## 2019-12-19 PROCEDURE — 76000 FLUOROSCOPY <1 HR PHYS/QHP: CPT

## 2019-12-19 PROCEDURE — 047H3Z1 DILATION OF RIGHT EXTERNAL ILIAC ARTERY USING DRUG-COATED BALLOON, PERCUTANEOUS APPROACH: ICD-10-PCS | Performed by: SURGERY

## 2019-12-19 PROCEDURE — 86850 RBC ANTIBODY SCREEN: CPT | Performed by: SURGERY

## 2019-12-19 PROCEDURE — 85730 THROMBOPLASTIN TIME PARTIAL: CPT | Performed by: SURGERY

## 2019-12-19 PROCEDURE — X27H385 DILATION OF RIGHT FEMORAL ARTERY WITH SUSTAINED RELEASE DRUG-ELUTING INTRALUMINAL DEVICE, PERCUTANEOUS APPROACH, NEW TECHNOLOGY GROUP 5: ICD-10-PCS | Performed by: SURGERY

## 2019-12-19 PROCEDURE — 25010000002 PROPOFOL 10 MG/ML EMULSION: Performed by: NURSE ANESTHETIST, CERTIFIED REGISTERED

## 2019-12-19 PROCEDURE — 25010000002 NEOSTIGMINE 10 MG/10ML SOLUTION 10 ML VIAL: Performed by: NURSE ANESTHETIST, CERTIFIED REGISTERED

## 2019-12-19 PROCEDURE — 34201 REMOVAL OF ARTERY CLOT: CPT | Performed by: SURGERY

## 2019-12-19 PROCEDURE — 75710 ARTERY X-RAYS ARM/LEG: CPT | Performed by: SURGERY

## 2019-12-19 PROCEDURE — 37226 PR REVSC OPN/PRQ FEM/POP W/STNT/ANGIOP SM VSL: CPT | Performed by: SURGERY

## 2019-12-19 PROCEDURE — 75625 CONTRAST EXAM ABDOMINL AORTA: CPT | Performed by: SURGERY

## 2019-12-19 PROCEDURE — 25010000002 HYDROMORPHONE PER 4 MG: Performed by: NURSE ANESTHETIST, CERTIFIED REGISTERED

## 2019-12-19 DEVICE — STNT PERIPH EVERFLEX ENTRUST 5F 6X100MM 120CM: Type: IMPLANTABLE DEVICE | Status: FUNCTIONAL

## 2019-12-19 RX ORDER — ONDANSETRON 2 MG/ML
4 INJECTION INTRAMUSCULAR; INTRAVENOUS EVERY 6 HOURS PRN
Status: DISCONTINUED | OUTPATIENT
Start: 2019-12-19 | End: 2019-12-21 | Stop reason: HOSPADM

## 2019-12-19 RX ORDER — NEOSTIGMINE METHYLSULFATE 1 MG/ML
INJECTION, SOLUTION INTRAVENOUS AS NEEDED
Status: DISCONTINUED | OUTPATIENT
Start: 2019-12-19 | End: 2019-12-19 | Stop reason: SURG

## 2019-12-19 RX ORDER — GLYCOPYRROLATE 0.2 MG/ML
INJECTION INTRAMUSCULAR; INTRAVENOUS AS NEEDED
Status: DISCONTINUED | OUTPATIENT
Start: 2019-12-19 | End: 2019-12-19 | Stop reason: SURG

## 2019-12-19 RX ORDER — FENTANYL CITRATE 50 UG/ML
25 INJECTION, SOLUTION INTRAMUSCULAR; INTRAVENOUS AS NEEDED
Status: DISCONTINUED | OUTPATIENT
Start: 2019-12-19 | End: 2019-12-19 | Stop reason: HOSPADM

## 2019-12-19 RX ORDER — ONDANSETRON 2 MG/ML
INJECTION INTRAMUSCULAR; INTRAVENOUS AS NEEDED
Status: DISCONTINUED | OUTPATIENT
Start: 2019-12-19 | End: 2019-12-19 | Stop reason: SURG

## 2019-12-19 RX ORDER — LABETALOL HYDROCHLORIDE 5 MG/ML
5 INJECTION, SOLUTION INTRAVENOUS
Status: DISCONTINUED | OUTPATIENT
Start: 2019-12-19 | End: 2019-12-19 | Stop reason: HOSPADM

## 2019-12-19 RX ORDER — SODIUM CHLORIDE 0.9 % (FLUSH) 0.9 %
10 SYRINGE (ML) INJECTION AS NEEDED
Status: DISCONTINUED | OUTPATIENT
Start: 2019-12-19 | End: 2019-12-19 | Stop reason: HOSPADM

## 2019-12-19 RX ORDER — ATORVASTATIN CALCIUM 40 MG/1
40 TABLET, FILM COATED ORAL NIGHTLY
Status: DISCONTINUED | OUTPATIENT
Start: 2019-12-19 | End: 2019-12-21 | Stop reason: HOSPADM

## 2019-12-19 RX ORDER — SODIUM CHLORIDE 0.9 % (FLUSH) 0.9 %
10 SYRINGE (ML) INJECTION EVERY 12 HOURS SCHEDULED
Status: DISCONTINUED | OUTPATIENT
Start: 2019-12-19 | End: 2019-12-19 | Stop reason: HOSPADM

## 2019-12-19 RX ORDER — MIDAZOLAM HYDROCHLORIDE 1 MG/ML
2 INJECTION INTRAMUSCULAR; INTRAVENOUS
Status: DISCONTINUED | OUTPATIENT
Start: 2019-12-19 | End: 2019-12-19 | Stop reason: HOSPADM

## 2019-12-19 RX ORDER — OXYCODONE AND ACETAMINOPHEN 7.5; 325 MG/1; MG/1
2 TABLET ORAL EVERY 4 HOURS PRN
Status: DISCONTINUED | OUTPATIENT
Start: 2019-12-19 | End: 2019-12-19 | Stop reason: HOSPADM

## 2019-12-19 RX ORDER — LIDOCAINE HYDROCHLORIDE 40 MG/ML
SOLUTION TOPICAL AS NEEDED
Status: DISCONTINUED | OUTPATIENT
Start: 2019-12-19 | End: 2019-12-19 | Stop reason: SURG

## 2019-12-19 RX ORDER — SODIUM CHLORIDE, SODIUM LACTATE, POTASSIUM CHLORIDE, CALCIUM CHLORIDE 600; 310; 30; 20 MG/100ML; MG/100ML; MG/100ML; MG/100ML
1000 INJECTION, SOLUTION INTRAVENOUS CONTINUOUS
Status: DISCONTINUED | OUTPATIENT
Start: 2019-12-19 | End: 2019-12-19

## 2019-12-19 RX ORDER — ONDANSETRON 2 MG/ML
4 INJECTION INTRAMUSCULAR; INTRAVENOUS ONCE AS NEEDED
Status: DISCONTINUED | OUTPATIENT
Start: 2019-12-19 | End: 2019-12-19 | Stop reason: HOSPADM

## 2019-12-19 RX ORDER — ROCURONIUM BROMIDE 10 MG/ML
INJECTION, SOLUTION INTRAVENOUS AS NEEDED
Status: DISCONTINUED | OUTPATIENT
Start: 2019-12-19 | End: 2019-12-19 | Stop reason: SURG

## 2019-12-19 RX ORDER — SODIUM CHLORIDE, SODIUM LACTATE, POTASSIUM CHLORIDE, CALCIUM CHLORIDE 600; 310; 30; 20 MG/100ML; MG/100ML; MG/100ML; MG/100ML
9 INJECTION, SOLUTION INTRAVENOUS CONTINUOUS
Status: DISCONTINUED | OUTPATIENT
Start: 2019-12-19 | End: 2019-12-19

## 2019-12-19 RX ORDER — BUPIVACAINE HCL/0.9 % NACL/PF 0.1 %
2 PLASTIC BAG, INJECTION (ML) EPIDURAL ONCE
Status: COMPLETED | OUTPATIENT
Start: 2019-12-19 | End: 2019-12-19

## 2019-12-19 RX ORDER — METOCLOPRAMIDE HYDROCHLORIDE 5 MG/ML
5 INJECTION INTRAMUSCULAR; INTRAVENOUS
Status: DISCONTINUED | OUTPATIENT
Start: 2019-12-19 | End: 2019-12-19 | Stop reason: HOSPADM

## 2019-12-19 RX ORDER — SODIUM CHLORIDE 9 MG/ML
100 INJECTION, SOLUTION INTRAVENOUS CONTINUOUS
Status: DISCONTINUED | OUTPATIENT
Start: 2019-12-19 | End: 2019-12-21 | Stop reason: HOSPADM

## 2019-12-19 RX ORDER — PROPOFOL 10 MG/ML
VIAL (ML) INTRAVENOUS AS NEEDED
Status: DISCONTINUED | OUTPATIENT
Start: 2019-12-19 | End: 2019-12-19 | Stop reason: SURG

## 2019-12-19 RX ORDER — HYDROMORPHONE HCL 110MG/55ML
PATIENT CONTROLLED ANALGESIA SYRINGE INTRAVENOUS AS NEEDED
Status: DISCONTINUED | OUTPATIENT
Start: 2019-12-19 | End: 2019-12-19 | Stop reason: SURG

## 2019-12-19 RX ORDER — IBUPROFEN 600 MG/1
600 TABLET ORAL ONCE AS NEEDED
Status: DISCONTINUED | OUTPATIENT
Start: 2019-12-19 | End: 2019-12-19 | Stop reason: HOSPADM

## 2019-12-19 RX ORDER — LIDOCAINE HYDROCHLORIDE 20 MG/ML
INJECTION, SOLUTION INFILTRATION; PERINEURAL AS NEEDED
Status: DISCONTINUED | OUTPATIENT
Start: 2019-12-19 | End: 2019-12-19 | Stop reason: SURG

## 2019-12-19 RX ORDER — FENTANYL CITRATE 50 UG/ML
25 INJECTION, SOLUTION INTRAMUSCULAR; INTRAVENOUS
Status: DISCONTINUED | OUTPATIENT
Start: 2019-12-19 | End: 2019-12-19 | Stop reason: HOSPADM

## 2019-12-19 RX ORDER — MIDAZOLAM HYDROCHLORIDE 1 MG/ML
1 INJECTION INTRAMUSCULAR; INTRAVENOUS
Status: DISCONTINUED | OUTPATIENT
Start: 2019-12-19 | End: 2019-12-19 | Stop reason: HOSPADM

## 2019-12-19 RX ORDER — IPRATROPIUM BROMIDE AND ALBUTEROL SULFATE 2.5; .5 MG/3ML; MG/3ML
3 SOLUTION RESPIRATORY (INHALATION) ONCE AS NEEDED
Status: DISCONTINUED | OUTPATIENT
Start: 2019-12-19 | End: 2019-12-19 | Stop reason: HOSPADM

## 2019-12-19 RX ORDER — ASPIRIN 81 MG/1
81 TABLET ORAL DAILY
Status: DISCONTINUED | OUTPATIENT
Start: 2019-12-19 | End: 2019-12-21 | Stop reason: HOSPADM

## 2019-12-19 RX ORDER — NALOXONE HCL 0.4 MG/ML
0.4 VIAL (ML) INJECTION AS NEEDED
Status: DISCONTINUED | OUTPATIENT
Start: 2019-12-19 | End: 2019-12-19 | Stop reason: HOSPADM

## 2019-12-19 RX ORDER — DEXTROSE MONOHYDRATE 25 G/50ML
12.5 INJECTION, SOLUTION INTRAVENOUS AS NEEDED
Status: DISCONTINUED | OUTPATIENT
Start: 2019-12-19 | End: 2019-12-19 | Stop reason: HOSPADM

## 2019-12-19 RX ORDER — ONDANSETRON 4 MG/1
4 TABLET, FILM COATED ORAL EVERY 6 HOURS PRN
Status: DISCONTINUED | OUTPATIENT
Start: 2019-12-19 | End: 2019-12-21 | Stop reason: HOSPADM

## 2019-12-19 RX ORDER — OXYCODONE AND ACETAMINOPHEN 10; 325 MG/1; MG/1
1 TABLET ORAL ONCE AS NEEDED
Status: COMPLETED | OUTPATIENT
Start: 2019-12-19 | End: 2019-12-19

## 2019-12-19 RX ORDER — FENTANYL CITRATE 50 UG/ML
INJECTION, SOLUTION INTRAMUSCULAR; INTRAVENOUS AS NEEDED
Status: DISCONTINUED | OUTPATIENT
Start: 2019-12-19 | End: 2019-12-19 | Stop reason: SURG

## 2019-12-19 RX ORDER — HYDROCODONE BITARTRATE AND ACETAMINOPHEN 7.5; 325 MG/1; MG/1
1 TABLET ORAL EVERY 4 HOURS PRN
Status: DISCONTINUED | OUTPATIENT
Start: 2019-12-19 | End: 2019-12-21 | Stop reason: HOSPADM

## 2019-12-19 RX ORDER — HEPARIN SODIUM 1000 [USP'U]/ML
INJECTION, SOLUTION INTRAVENOUS; SUBCUTANEOUS AS NEEDED
Status: DISCONTINUED | OUTPATIENT
Start: 2019-12-19 | End: 2019-12-19 | Stop reason: SURG

## 2019-12-19 RX ORDER — NALOXONE HCL 0.4 MG/ML
0.4 VIAL (ML) INJECTION
Status: DISCONTINUED | OUTPATIENT
Start: 2019-12-19 | End: 2019-12-21 | Stop reason: HOSPADM

## 2019-12-19 RX ORDER — DEXAMETHASONE SODIUM PHOSPHATE 4 MG/ML
INJECTION, SOLUTION INTRA-ARTICULAR; INTRALESIONAL; INTRAMUSCULAR; INTRAVENOUS; SOFT TISSUE AS NEEDED
Status: DISCONTINUED | OUTPATIENT
Start: 2019-12-19 | End: 2019-12-19 | Stop reason: SURG

## 2019-12-19 RX ORDER — SODIUM CHLORIDE 0.9 % (FLUSH) 0.9 %
3 SYRINGE (ML) INJECTION AS NEEDED
Status: DISCONTINUED | OUTPATIENT
Start: 2019-12-19 | End: 2019-12-19 | Stop reason: HOSPADM

## 2019-12-19 RX ADMIN — HEPARIN SODIUM 1000 UNITS: 1000 INJECTION, SOLUTION INTRAVENOUS; SUBCUTANEOUS at 16:27

## 2019-12-19 RX ADMIN — HEPARIN SODIUM 1000 UNITS: 1000 INJECTION, SOLUTION INTRAVENOUS; SUBCUTANEOUS at 15:01

## 2019-12-19 RX ADMIN — FENTANYL CITRATE 100 MCG: 50 INJECTION, SOLUTION INTRAMUSCULAR; INTRAVENOUS at 13:32

## 2019-12-19 RX ADMIN — BIVALIRUDIN 1.75 MG/KG/HR: 250 INJECTION, POWDER, LYOPHILIZED, FOR SOLUTION INTRAVENOUS at 17:43

## 2019-12-19 RX ADMIN — ROCURONIUM BROMIDE 50 MG: 10 INJECTION INTRAVENOUS at 13:32

## 2019-12-19 RX ADMIN — HEPARIN SODIUM 3000 UNITS: 1000 INJECTION, SOLUTION INTRAVENOUS; SUBCUTANEOUS at 17:18

## 2019-12-19 RX ADMIN — HYDROMORPHONE HYDROCHLORIDE 1 MG: 2 INJECTION, SOLUTION INTRAMUSCULAR; INTRAVENOUS; SUBCUTANEOUS at 18:36

## 2019-12-19 RX ADMIN — GLYCOPYRROLATE 0.2 MG: 0.2 INJECTION, SOLUTION INTRAMUSCULAR; INTRAVENOUS at 13:59

## 2019-12-19 RX ADMIN — SODIUM CHLORIDE 2 MCG/KG/MIN: 9 INJECTION, SOLUTION INTRAVENOUS at 18:40

## 2019-12-19 RX ADMIN — SODIUM CHLORIDE 100 ML/HR: 9 INJECTION, SOLUTION INTRAVENOUS at 21:26

## 2019-12-19 RX ADMIN — PROPOFOL 150 MG: 10 INJECTION, EMULSION INTRAVENOUS at 13:32

## 2019-12-19 RX ADMIN — DEXAMETHASONE SODIUM PHOSPHATE 4 MG: 4 INJECTION, SOLUTION INTRAMUSCULAR; INTRAVENOUS at 18:25

## 2019-12-19 RX ADMIN — LIDOCAINE HYDROCHLORIDE 100 MG: 20 INJECTION, SOLUTION INFILTRATION; PERINEURAL at 13:32

## 2019-12-19 RX ADMIN — SODIUM CHLORIDE, POTASSIUM CHLORIDE, SODIUM LACTATE AND CALCIUM CHLORIDE: 600; 310; 30; 20 INJECTION, SOLUTION INTRAVENOUS at 16:32

## 2019-12-19 RX ADMIN — HYDROMORPHONE HYDROCHLORIDE 1 MG: 2 INJECTION, SOLUTION INTRAMUSCULAR; INTRAVENOUS; SUBCUTANEOUS at 18:33

## 2019-12-19 RX ADMIN — HEPARIN SODIUM 1000 UNITS: 1000 INJECTION, SOLUTION INTRAVENOUS; SUBCUTANEOUS at 14:01

## 2019-12-19 RX ADMIN — ATORVASTATIN CALCIUM 40 MG: 40 TABLET, FILM COATED ORAL at 21:22

## 2019-12-19 RX ADMIN — FENTANYL CITRATE 100 MCG: 50 INJECTION, SOLUTION INTRAMUSCULAR; INTRAVENOUS at 17:20

## 2019-12-19 RX ADMIN — MIDAZOLAM 2 MG: 1 INJECTION INTRAMUSCULAR; INTRAVENOUS at 12:41

## 2019-12-19 RX ADMIN — GLYCOPYRROLATE 0.4 MG: 0.2 INJECTION, SOLUTION INTRAMUSCULAR; INTRAVENOUS at 18:23

## 2019-12-19 RX ADMIN — PHENYLEPHRINE HYDROCHLORIDE 1 MCG/KG/MIN: 10 INJECTION INTRAVENOUS at 13:36

## 2019-12-19 RX ADMIN — ASPIRIN 81 MG: 81 TABLET ORAL at 21:22

## 2019-12-19 RX ADMIN — Medication 2 G: at 13:36

## 2019-12-19 RX ADMIN — SODIUM CHLORIDE, POTASSIUM CHLORIDE, SODIUM LACTATE AND CALCIUM CHLORIDE 1000 ML: 600; 310; 30; 20 INJECTION, SOLUTION INTRAVENOUS at 09:40

## 2019-12-19 RX ADMIN — SODIUM CHLORIDE 1000 ML: 9 INJECTION, SOLUTION INTRAVENOUS at 20:29

## 2019-12-19 RX ADMIN — OXYCODONE HYDROCHLORIDE AND ACETAMINOPHEN 1 TABLET: 10; 325 TABLET ORAL at 19:15

## 2019-12-19 RX ADMIN — ROCURONIUM BROMIDE 25 MG: 10 INJECTION INTRAVENOUS at 17:20

## 2019-12-19 RX ADMIN — Medication 1 G: at 17:21

## 2019-12-19 RX ADMIN — NEOSTIGMINE METHYLSULFATE 4 MG: 1 INJECTION INTRAVENOUS at 18:23

## 2019-12-19 RX ADMIN — ONDANSETRON HYDROCHLORIDE 4 MG: 2 SOLUTION INTRAMUSCULAR; INTRAVENOUS at 18:25

## 2019-12-19 RX ADMIN — LIDOCAINE HYDROCHLORIDE 1 EACH: 40 SOLUTION TOPICAL at 13:32

## 2019-12-19 RX ADMIN — SODIUM CHLORIDE, POTASSIUM CHLORIDE, SODIUM LACTATE AND CALCIUM CHLORIDE: 600; 310; 30; 20 INJECTION, SOLUTION INTRAVENOUS at 17:55

## 2019-12-19 RX ADMIN — HEPARIN SODIUM 4000 UNITS: 1000 INJECTION, SOLUTION INTRAVENOUS; SUBCUTANEOUS at 15:29

## 2019-12-19 NOTE — ANESTHESIA PROCEDURE NOTES
Arterial Line      Patient reassessed immediately prior to procedure    Patient location during procedure: OR   Line placed for hemodynamic monitoring, ABGs/Labs/ISTAT and MD/Surgeon request.  Performed By   CRNA: Tejas Koenig CRNA  Preanesthetic Checklist  Completed: patient identified, site marked, surgical consent, pre-op evaluation, timeout performed, IV checked, risks and benefits discussed and monitors and equipment checked  Arterial Line Prep   Sterile Tech: mask, gloves and cap  Prep: alcohol swabs  Arterial Line Procedure   Laterality:left  Location:  radial artery  Catheter size: 20 G   Guidance: ultrasound guided and palpation technique  Number of attempts: 1  Successful placement: yes  Post Assessment   Dressing Type: secured with tape.   Complications no  Circ/Move/Sens Assessment: normal.   Patient Tolerance: patient tolerated the procedure well with no apparent complications

## 2019-12-19 NOTE — ANESTHESIA PREPROCEDURE EVALUATION
Anesthesia Evaluation     Patient summary reviewed   no history of anesthetic complications:  NPO Solid Status: > 8 hours             Airway   Mallampati: II  TM distance: >3 FB  Neck ROM: full  Dental      Pulmonary    (+) a smoker Former,   (-) COPD, asthma, sleep apnea  Cardiovascular   Exercise tolerance: good (4-7 METS)    ECG reviewed    (+) PVD, hyperlipidemia,   (-) pacemaker, past MI, angina, cardiac stents      Neuro/Psych  (-) seizures, TIA, CVA  GI/Hepatic/Renal/Endo    (-) GERD, liver disease, no renal disease, diabetes    Musculoskeletal     Abdominal    Substance History      OB/GYN          Other                        Anesthesia Plan    ASA 3     general     intravenous induction     Anesthetic plan, all risks, benefits, and alternatives have been provided, discussed and informed consent has been obtained with: patient.

## 2019-12-19 NOTE — ANESTHESIA PROCEDURE NOTES
Airway  Urgency: elective    Date/Time: 12/19/2019 1:33 PM  Airway not difficult    General Information and Staff    Patient location during procedure: OR  CRNA: Tejas Koenig CRNA    Indications and Patient Condition  Indications for airway management: airway protection    Preoxygenated: yes  MILS maintained throughout  Mask difficulty assessment: 1 - vent by mask    Final Airway Details  Final airway type: endotracheal airway      Successful airway: ETT  Cuffed: yes   Successful intubation technique: direct laryngoscopy  Endotracheal tube insertion site: oral  Blade: Mei  Blade size: 2  ETT size (mm): 7.5  Cormack-Lehane Classification: grade I - full view of glottis  Placement verified by: chest auscultation and capnometry   Cuff volume (mL): 5  Measured from: lips  Number of attempts at approach: 1

## 2019-12-20 LAB
ANION GAP SERPL CALCULATED.3IONS-SCNC: 8 MMOL/L (ref 5–15)
APTT PPP: 50.1 SECONDS (ref 24.1–35)
APTT PPP: 56.1 SECONDS (ref 24.1–35)
APTT PPP: 69.9 SECONDS (ref 24.1–35)
APTT PPP: 71.2 SECONDS (ref 24.1–35)
APTT PPP: 74.5 SECONDS (ref 24.1–35)
BASOPHILS # BLD AUTO: 0.02 10*3/MM3 (ref 0–0.2)
BASOPHILS NFR BLD AUTO: 0.2 % (ref 0–1.5)
BUN BLD-MCNC: 11 MG/DL (ref 6–20)
BUN/CREAT SERPL: 12.8 (ref 7–25)
CALCIUM SPEC-SCNC: 8.3 MG/DL (ref 8.6–10.5)
CHLORIDE SERPL-SCNC: 103 MMOL/L (ref 98–107)
CO2 SERPL-SCNC: 25 MMOL/L (ref 22–29)
CREAT BLD-MCNC: 0.86 MG/DL (ref 0.76–1.27)
DEPRECATED RDW RBC AUTO: 46.5 FL (ref 37–54)
EOSINOPHIL # BLD AUTO: 0 10*3/MM3 (ref 0–0.4)
EOSINOPHIL NFR BLD AUTO: 0 % (ref 0.3–6.2)
ERYTHROCYTE [DISTWIDTH] IN BLOOD BY AUTOMATED COUNT: 14.2 % (ref 12.3–15.4)
GFR SERPL CREATININE-BSD FRML MDRD: 97 ML/MIN/1.73
GLUCOSE BLD-MCNC: 137 MG/DL (ref 65–99)
HCT VFR BLD AUTO: 36.3 % (ref 37.5–51)
HGB BLD-MCNC: 12 G/DL (ref 13–17.7)
IMM GRANULOCYTES # BLD AUTO: 0.06 10*3/MM3 (ref 0–0.05)
IMM GRANULOCYTES NFR BLD AUTO: 0.5 % (ref 0–0.5)
LYMPHOCYTES # BLD AUTO: 1.52 10*3/MM3 (ref 0.7–3.1)
LYMPHOCYTES NFR BLD AUTO: 12.1 % (ref 19.6–45.3)
MCH RBC QN AUTO: 29.6 PG (ref 26.6–33)
MCHC RBC AUTO-ENTMCNC: 33.1 G/DL (ref 31.5–35.7)
MCV RBC AUTO: 89.6 FL (ref 79–97)
MONOCYTES # BLD AUTO: 0.95 10*3/MM3 (ref 0.1–0.9)
MONOCYTES NFR BLD AUTO: 7.6 % (ref 5–12)
NEUTROPHILS # BLD AUTO: 10 10*3/MM3 (ref 1.7–7)
NEUTROPHILS NFR BLD AUTO: 79.6 % (ref 42.7–76)
NRBC BLD AUTO-RTO: 0 /100 WBC (ref 0–0.2)
PLATELET # BLD AUTO: 198 10*3/MM3 (ref 140–450)
PMV BLD AUTO: 10 FL (ref 6–12)
POTASSIUM BLD-SCNC: 4.5 MMOL/L (ref 3.5–5.2)
RBC # BLD AUTO: 4.05 10*6/MM3 (ref 4.14–5.8)
SODIUM BLD-SCNC: 136 MMOL/L (ref 136–145)
WBC NRBC COR # BLD: 12.55 10*3/MM3 (ref 3.4–10.8)

## 2019-12-20 PROCEDURE — 85025 COMPLETE CBC W/AUTO DIFF WBC: CPT | Performed by: SURGERY

## 2019-12-20 PROCEDURE — 99024 POSTOP FOLLOW-UP VISIT: CPT | Performed by: SURGERY

## 2019-12-20 PROCEDURE — 85730 THROMBOPLASTIN TIME PARTIAL: CPT | Performed by: SURGERY

## 2019-12-20 PROCEDURE — 86022 PLATELET ANTIBODIES: CPT | Performed by: SURGERY

## 2019-12-20 PROCEDURE — 94799 UNLISTED PULMONARY SVC/PX: CPT

## 2019-12-20 PROCEDURE — 94760 N-INVAS EAR/PLS OXIMETRY 1: CPT

## 2019-12-20 PROCEDURE — 80048 BASIC METABOLIC PNL TOTAL CA: CPT | Performed by: SURGERY

## 2019-12-20 RX ADMIN — HYDROCODONE BITARTRATE AND ACETAMINOPHEN 1 TABLET: 7.5; 325 TABLET ORAL at 00:05

## 2019-12-20 RX ADMIN — HYDROCODONE BITARTRATE AND ACETAMINOPHEN 1 TABLET: 7.5; 325 TABLET ORAL at 15:52

## 2019-12-20 RX ADMIN — ASPIRIN 81 MG: 81 TABLET ORAL at 08:27

## 2019-12-20 RX ADMIN — HYDROCODONE BITARTRATE AND ACETAMINOPHEN 1 TABLET: 7.5; 325 TABLET ORAL at 08:27

## 2019-12-20 RX ADMIN — ATORVASTATIN CALCIUM 40 MG: 40 TABLET, FILM COATED ORAL at 21:01

## 2019-12-20 RX ADMIN — SODIUM CHLORIDE 100 ML/HR: 9 INJECTION, SOLUTION INTRAVENOUS at 07:39

## 2019-12-20 NOTE — PAYOR COMM NOTE
"ADMIT INPT 12-19-19  [id number]  UR PHONE    [phone]    Adryan Laguerre (44 y.o. Male)     Date of Birth Social Security Number Address Home Phone MRN    [id number]  [id number] [place] [id number] [phone] [id number]    Restorationist Marital Status          Other Single       Admission Date Admission Type Admitting Provider Attending Provider Department, Room/Bed    12/19/19 Elective Karson Rivera MD Cumbers, Jason Ronald, MD [facility] INTENSIVE CARE, I009/1    Discharge Date Discharge Disposition Discharge Destination                       Attending Provider:  Karson Rivera MD    Allergies:  No Known Allergies    Isolation:  None   Infection:  None   Code Status:  CPR    Ht:  179.1 cm (70.5\")   Wt:  89.5 kg (197 lb 6.4 oz)    Admission Cmt:  None   Principal Problem:  Atherosclerosis of native artery of right lower extremity with intermittent claudication (CMS/[facility]) [I70.211] More...                 Active Insurance as of 12/19/2019     Primary Coverage     Payor Plan Insurance Group Employer/Plan Group    ANTHEM MEDICAID ANTHEM MEDICAID [id number]     Payor Plan Address Payor Plan Phone Number Payor Plan Fax Number Effective Dates    [address] [address] [phone]  8/15/2019 - None Entered    [employer] [id number]       Subscriber Name Subscriber Birth Date Member ID       ADRYAN LAGUERRE 1975 [id number]                 Emergency Contacts      (Rel.) Home Phone Work Phone Mobile Phone    Tiff Douglas (Mother) -- -- [phone]               History & Physical      Karson Rivera MD at 12/19/19 1310          H&P reviewed. The patient was examined and there are no changes to the H&P.   For angiogram and RLE thrombectomy. Risks of the procedure were discussed.  These include, but are not limited to, bleeding, infection, vessel damage, nerve damage, embolus, and loss of limb.  The patient understands these risks and wishes to " proceed with procedure.          Electronically signed by Karson Rivera MD at 12/19/19 6659   Source Note          HPI     I had the pleasure of seeing your patient in the office today for follow up.  As you recall, the patient is a 44 y.o. male who we are currently following for previous aortic thrombus.  He had undergone angiogram with placement of an aortobiiliac stent graft back in August.  He had been seen here 1 month postoperatively and at that time a CTA was done which showed excellent position of the stent graft which was widely patent and no further evidence of any thrombus.  He had palpable bilateral pedal pulses at that time with no claudication.  He was advised to continue on both aspirin and Plavix at that visit moving forward.  However he reports for about the last month he has been experiencing right lower extremity claudication mainly in the calf.  It is at fairly short distances at this point and is making it difficult for him to work.  About a week ago he reports he had some weakness to the right leg and had a fall and suffered some scratches to his face.  On further questioning he reports that about a month ago he did stop his Plavix for a week or 2 for unknown reasons but has now since restarted.  Otherwise he denies any ischemic rest pain.  After being seen in the office today he was sent for a CTA of the abdominal aorta with runoff which I did personally review.  It shows that the previously placed aortoiliac stent graft is widely patent.  In the right lower extremity runoff into the external iliac and common femoral is patent and the profunda is also patent.  However just beyond its takeoff the right superficial femoral artery appears completely occluded and remained so until it reconstitutes at the level of the popliteal artery above the knee.  The tibial vessels appear diminutive but do show patency.      Review of Systems   Constitutional: Negative.  Negative for activity change,  "appetite change, chills, diaphoresis, fatigue and fever.   HENT: Negative.  Negative for congestion, sneezing, sore throat and trouble swallowing.    Eyes: Negative.  Negative for visual disturbance.   Respiratory: Negative.  Negative for chest tightness and shortness of breath.    Cardiovascular: Negative.  Negative for chest pain, palpitations and leg swelling.   Gastrointestinal: Negative.  Negative for abdominal distention, abdominal pain, nausea and vomiting.   Endocrine: Negative.    Genitourinary: Negative.    Musculoskeletal: Negative.         New right calf claudication for the last month.   Skin: Negative.    Allergic/Immunologic: Negative.    Neurological: Negative.    Hematological: Negative.    Psychiatric/Behavioral: Negative.        /86   Pulse 68   Ht 177.8 cm (70\")   Wt 86.2 kg (190 lb)   SpO2 98%   BMI 27.26 kg/m²   Physical Exam   Constitutional: He is oriented to person, place, and time. He appears well-developed and well-nourished.   HENT:   Head: Normocephalic and atraumatic.   Eyes: Pupils are equal, round, and reactive to light. EOM are normal.   Neck: Normal range of motion. Neck supple. No JVD present.   Cardiovascular: Normal rate and regular rhythm.   Pulses:       Carotid pulses are 2+ on the right side, and 2+ on the left side.       Radial pulses are 2+ on the right side, and 2+ on the left side.        Femoral pulses are 2+ on the right side, and 2+ on the left side.       Popliteal pulses are 0 on the right side, and 2+ on the left side.        Dorsalis pedis pulses are 0 on the right side, and 2+ on the left side.        Posterior tibial pulses are 0 on the right side, and 2+ on the left side.   Bilateral groin incisions are well-healed.    He does have Doppler signals present at the right DP and PT although they are monophasic.   Pulmonary/Chest: Effort normal. No respiratory distress.   Abdominal: Soft. He exhibits no distension.   Musculoskeletal: Normal range of " motion. He exhibits no edema, tenderness or deformity.   Neurological: He is alert and oriented to person, place, and time.   Skin: Skin is warm and dry. Less than 2 seconds on the left, greater than 3 seconds on the right..   Psychiatric: He has a normal mood and affect. His behavior is normal. Judgment and thought content normal.   Vitals reviewed.      DIAGNOSTIC DATA:    Ct Angio Abdominal Aorta Bilateral Iliofem Runoff    Result Date: 12/13/2019  Narrative: Exam: CT ANGIO ABDOMINAL AORTA BILAT ILIOFEM RUNOFF- -- 12/13/2019 11:20 AM CST  Indication: Right lower extremity ischemia, status post aortoiliac endograft  Comparison: 09/05/2019  DOSE LENGTH PRODUCT: 1554 mGy cm. Automated exposure control was also utilized to decrease patient radiation dose.  Findings:  The abdominal aorta is normal in caliber. The celiac trunk, SMA, and renal arteries are widely patent. The BROOKE is not well opacified and may be excluded. Postprocedural change of aortobiiliac stent grafting. The stented portion of the aorta is normal in caliber and the stent graft is widely patent. No endoleak or graft migration. The common iliac arteries and bilateral internal/external iliac arteries are widely patent.  Right lower extremity: The right common femoral artery is widely patent. The right profunda is widely patent. The right superficial femoral artery is completely occluded just beyond its origin with reconstitution distally just above the popliteal artery. Popliteal artery is slightly diminutive but widely patent. Normal right trifurcation. Three-vessel runoff appears maintained although dorsalis pedis appear slightly diminutive compared to the left.  Left lower extremity: The left common femoral artery is widely patent. The left profunda and superficial femoral arteries are widely patent. Left popliteal artery is widely patent. Normal left-sided trifurcation. Three-vessel runoff on the left.  Soft tissue findings:  Lung bases are clear.  Arterial phase liver, spleen, right adrenal gland, and pancreas appear unremarkable. No change in small left adrenal gland nodule measuring 1.2 cm, likely an adenoma. Gallbladder and biliary tree appear normal. No solid renal lesion. No urolithiasis or hydronephrosis. No focal urinary bladder abnormality. Prostate and seminal vesicles appear unremarkable.  No abnormal bowel distention or adjacent inflammation. Normal appendix. No ascites or free pelvic fluid. No pelvic mass organized pelvic collection. Prior bilateral inguinal cutdowns are noted. No enlarged retroperitoneal, mesenteric, pelvic, or inguinal lymph nodes. No suspicious focal bone lesion.      Impression: 1. Complete occlusion of the right superficial femoral artery just beyond its origin with reconstitution just above the popliteal artery. Three-vessel runoff is maintained on the right although dorsalis pedis appears slightly diminutive. 2. Three-vessel runoff on the left. 3. Aortobiiliac stent graft remains widely patent. No endoleak or graft migration. 4. Stable small 1.2 cm left adrenal gland nodule. This likely represents an adenoma.  --- These findings were discussed with ordering provider's nurse Arielle on 12/13/2019 1:00 PM CST by Dr. Leo Mercedes. This report was finalized on 12/13/2019 13:01 by Dr. Leo Mercedes MD.      Patient Active Problem List   Diagnosis   • Ischemic toe   • Thrombus of aorta (CMS/HCC)   • Tobacco abuse   • Mixed hyperlipidemia   • Blue toe syndrome (CMS/HCC)         ICD-10-CM ICD-9-CM   1. Atherosclerosis of native artery of right lower extremity with intermittent claudication (CMS/HCC) I70.211 440.21       Lab Frequency Next Occurrence   CT Angiogram Abdomen Pelvis With & Without Contrast Once 09/25/2020       PLAN: After thoroughly evaluating Fiona Laguerre, I believe the best course of action is to plan to proceed to the OR for right groin exploration and right lower extremity thromboembolectomy as well as probable  angiogram with possible intervention on the right SFA.  As above I have reviewed the CTA that was done today and it shows complete occlusion of the right SFA from just after its takeoff with reconstitution at the level of the above-knee popliteal artery.  Given this finding along with the significant claudication that he is having he is indicated for revascularization. Risks of the procedure were discussed.  These include, but are not limited to, bleeding, infection, vessel damage, nerve damage, embolus, and loss of limb.  The patient understands these risks and wishes to proceed with procedure.  The patient is to continue taking their medications as previously discussed.   I did discuss vascular risk factors as they pertain to the progression of vascular disease including controlling hyperlipidemia. These factors remain stable. Patient's Body mass index is 27.26 kg/m². BMI is above normal parameters. Recommendations include: educational material. This was all discussed in full with complete understanding.    Electronically signed by Karson Rivera MD at 12/13/19 1316             Karson Rviera MD at 12/13/19 1400          HPI     I had the pleasure of seeing your patient in the office today for follow up.  As you recall, the patient is a 44 y.o. male who we are currently following for previous aortic thrombus.  He had undergone angiogram with placement of an aortobiiliac stent graft back in August.  He had been seen here 1 month postoperatively and at that time a CTA was done which showed excellent position of the stent graft which was widely patent and no further evidence of any thrombus.  He had palpable bilateral pedal pulses at that time with no claudication.  He was advised to continue on both aspirin and Plavix at that visit moving forward.  However he reports for about the last month he has been experiencing right lower extremity claudication mainly in the calf.  It is at fairly short  "distances at this point and is making it difficult for him to work.  About a week ago he reports he had some weakness to the right leg and had a fall and suffered some scratches to his face.  On further questioning he reports that about a month ago he did stop his Plavix for a week or 2 for unknown reasons but has now since restarted.  Otherwise he denies any ischemic rest pain.  After being seen in the office today he was sent for a CTA of the abdominal aorta with runoff which I did personally review.  It shows that the previously placed aortoiliac stent graft is widely patent.  In the right lower extremity runoff into the external iliac and common femoral is patent and the profunda is also patent.  However just beyond its takeoff the right superficial femoral artery appears completely occluded and remained so until it reconstitutes at the level of the popliteal artery above the knee.  The tibial vessels appear diminutive but do show patency.      Review of Systems   Constitutional: Negative.  Negative for activity change, appetite change, chills, diaphoresis, fatigue and fever.   HENT: Negative.  Negative for congestion, sneezing, sore throat and trouble swallowing.    Eyes: Negative.  Negative for visual disturbance.   Respiratory: Negative.  Negative for chest tightness and shortness of breath.    Cardiovascular: Negative.  Negative for chest pain, palpitations and leg swelling.   Gastrointestinal: Negative.  Negative for abdominal distention, abdominal pain, nausea and vomiting.   Endocrine: Negative.    Genitourinary: Negative.    Musculoskeletal: Negative.         New right calf claudication for the last month.   Skin: Negative.    Allergic/Immunologic: Negative.    Neurological: Negative.    Hematological: Negative.    Psychiatric/Behavioral: Negative.        /86   Pulse 68   Ht 177.8 cm (70\")   Wt 86.2 kg (190 lb)   SpO2 98%   BMI 27.26 kg/m²   Physical Exam   Constitutional: He is oriented to " person, place, and time. He appears well-developed and well-nourished.   HENT:   Head: Normocephalic and atraumatic.   Eyes: Pupils are equal, round, and reactive to light. EOM are normal.   Neck: Normal range of motion. Neck supple. No JVD present.   Cardiovascular: Normal rate and regular rhythm.   Pulses:       Carotid pulses are 2+ on the right side, and 2+ on the left side.       Radial pulses are 2+ on the right side, and 2+ on the left side.        Femoral pulses are 2+ on the right side, and 2+ on the left side.       Popliteal pulses are 0 on the right side, and 2+ on the left side.        Dorsalis pedis pulses are 0 on the right side, and 2+ on the left side.        Posterior tibial pulses are 0 on the right side, and 2+ on the left side.   Bilateral groin incisions are well-healed.    He does have Doppler signals present at the right DP and PT although they are monophasic.   Pulmonary/Chest: Effort normal. No respiratory distress.   Abdominal: Soft. He exhibits no distension.   Musculoskeletal: Normal range of motion. He exhibits no edema, tenderness or deformity.   Neurological: He is alert and oriented to person, place, and time.   Skin: Skin is warm and dry. Less than 2 seconds on the left, greater than 3 seconds on the right..   Psychiatric: He has a normal mood and affect. His behavior is normal. Judgment and thought content normal.   Vitals reviewed.      DIAGNOSTIC DATA:    Ct Angio Abdominal Aorta Bilateral Iliofem Runoff    Result Date: 12/13/2019  Narrative: Exam: CT ANGIO ABDOMINAL AORTA BILAT ILIOFEM RUNOFF- -- 12/13/2019 11:20 AM CST  Indication: Right lower extremity ischemia, status post aortoiliac endograft  Comparison: 09/05/2019  DOSE LENGTH PRODUCT: 1554 mGy cm. Automated exposure control was also utilized to decrease patient radiation dose.  Findings:  The abdominal aorta is normal in caliber. The celiac trunk, SMA, and renal arteries are widely patent. The BROOKE is not well opacified  and may be excluded. Postprocedural change of aortobiiliac stent grafting. The stented portion of the aorta is normal in caliber and the stent graft is widely patent. No endoleak or graft migration. The common iliac arteries and bilateral internal/external iliac arteries are widely patent.  Right lower extremity: The right common femoral artery is widely patent. The right profunda is widely patent. The right superficial femoral artery is completely occluded just beyond its origin with reconstitution distally just above the popliteal artery. Popliteal artery is slightly diminutive but widely patent. Normal right trifurcation. Three-vessel runoff appears maintained although dorsalis pedis appear slightly diminutive compared to the left.  Left lower extremity: The left common femoral artery is widely patent. The left profunda and superficial femoral arteries are widely patent. Left popliteal artery is widely patent. Normal left-sided trifurcation. Three-vessel runoff on the left.  Soft tissue findings:  Lung bases are clear. Arterial phase liver, spleen, right adrenal gland, and pancreas appear unremarkable. No change in small left adrenal gland nodule measuring 1.2 cm, likely an adenoma. Gallbladder and biliary tree appear normal. No solid renal lesion. No urolithiasis or hydronephrosis. No focal urinary bladder abnormality. Prostate and seminal vesicles appear unremarkable.  No abnormal bowel distention or adjacent inflammation. Normal appendix. No ascites or free pelvic fluid. No pelvic mass organized pelvic collection. Prior bilateral inguinal cutdowns are noted. No enlarged retroperitoneal, mesenteric, pelvic, or inguinal lymph nodes. No suspicious focal bone lesion.      Impression: 1. Complete occlusion of the right superficial femoral artery just beyond its origin with reconstitution just above the popliteal artery. Three-vessel runoff is maintained on the right although dorsalis pedis appears slightly  diminutive. 2. Three-vessel runoff on the left. 3. Aortobiiliac stent graft remains widely patent. No endoleak or graft migration. 4. Stable small 1.2 cm left adrenal gland nodule. This likely represents an adenoma.  --- These findings were discussed with ordering provider's nurse Arielle on 12/13/2019 1:00 PM CST by Dr. Leo Mercedes. This report was finalized on 12/13/2019 13:01 by Dr. Leo Mercedes MD.      Patient Active Problem List   Diagnosis   • Ischemic toe   • Thrombus of aorta (CMS/HCC)   • Tobacco abuse   • Mixed hyperlipidemia   • Blue toe syndrome (CMS/HCC)         ICD-10-CM ICD-9-CM   1. Atherosclerosis of native artery of right lower extremity with intermittent claudication (CMS/HCC) I70.211 440.21       Lab Frequency Next Occurrence   CT Angiogram Abdomen Pelvis With & Without Contrast Once 09/25/2020       PLAN: After thoroughly evaluating Fiona Laguerre, I believe the best course of action is to plan to proceed to the OR for right groin exploration and right lower extremity thromboembolectomy as well as probable angiogram with possible intervention on the right SFA.  As above I have reviewed the CTA that was done today and it shows complete occlusion of the right SFA from just after its takeoff with reconstitution at the level of the above-knee popliteal artery.  Given this finding along with the significant claudication that he is having he is indicated for revascularization. Risks of the procedure were discussed.  These include, but are not limited to, bleeding, infection, vessel damage, nerve damage, embolus, and loss of limb.  The patient understands these risks and wishes to proceed with procedure.  The patient is to continue taking their medications as previously discussed.   I did discuss vascular risk factors as they pertain to the progression of vascular disease including controlling hyperlipidemia. These factors remain stable. Patient's Body mass index is 27.26 kg/m². BMI is above normal  parameters. Recommendations include: educational material. This was all discussed in full with complete understanding.    Electronically signed by Karson Rivera MD at 12/13/19 1316       Oxygen Therapy (last 2 days)     Date/Time   SpO2   Device (Oxygen Therapy)   Flow (L/min)   Oxygen Concentration (%)   ETCO2 (mmHg)    12/20/19 0400   100   nasal cannula   2   --   --    12/20/19 0356   100   nasal cannula   2   --   --    12/20/19 0345   100   --   --   --   --    12/20/19 0330   100   --   --   --   --    12/20/19 0315   100   --   --   --   --    12/20/19 0300   100   nasal cannula   2   --   --    12/20/19 0245   100   --   --   --   --    12/20/19 0230   100   --   --   --   --    12/20/19 0215   100   --   --   --   --    12/20/19 0200   99   nasal cannula   2   --   --    12/20/19 0145   100   --   --   --   --    12/20/19 0130   99   --   --   --   --    12/20/19 0115   99   --   --   --   --    12/20/19 0100   96   nasal cannula   2   --   --    12/20/19 0045   100   --   --   --   --    12/20/19 0030   97   --   --   --   --    12/20/19 0015   100   --   --   --   --    12/20/19 0000   95   nasal cannula   2   --   --    12/19/19 2357   100   nasal cannula   2   --   --    12/19/19 2345   96   --   --   --   --    12/19/19 2330   100   --   --   --   --    12/19/19 2315   98   --   --   --   --    12/19/19 2300   98   nasal cannula   2   --   --    12/19/19 2245   100   --   --   --   --    12/19/19 2230   98   --   --   --   --    12/19/19 2215   100   --   --   --   --    12/19/19 2200   96   nasal cannula   2   --   --    12/19/19 2145   99   --   --   --   --    12/19/19 2130   99   --   --   --   --    12/19/19 2115   98   --   --   --   --    12/19/19 2100 99   nasal cannula   2   --   --    12/19/19 2054   100   nasal cannula   2   --   --    12/19/19 2045   99   --   --   --   --    12/19/19 2030   99   --   --   --   --    12/19/19 2015   97   --   --   --   --    12/19/19 2000 97    nasal cannula   2   --   --    12/19/19 1945   99   nasal cannula   2   --   --    12/19/19 1930   91   room air   --   --   --    12/19/19 1915   94   room air   --   --   --    12/19/19 1910   99   simple face mask   8   --   --    12/19/19 1905   99   simple face mask   8   --   --    12/19/19 1900   99   simple face mask   8   --   --    12/19/19 1855   98   simple face mask   8   --   --    12/19/19 1854   100   simple face mask   8   --   --    12/19/19 1234   99   room air   --   --   --    12/19/19 0911   97   room air   --   --   --            Intake & Output (last 2 days)       12/18 0701 - 12/19 0700 12/19 0701 - 12/20 0700    P.O.  720    I.V. (mL/kg)  4093.6 (45.7)    Total Intake(mL/kg)  4813.6 (53.8)    Urine (mL/kg/hr)  1200    Total Output  1200    Net  +3613.6              Hospital Medications (all)       Dose Frequency Start End    argatroban 250 mg in sodium chloride 0.9 % 250 mL (1 mg/mL) infusion 2 mcg/kg/min × 86.1 kg Titrated 12/19/2019 12/22/2019    Admin Instructions:     Route: Intravenous    aspirin EC tablet 81 mg 81 mg Daily 12/19/2019     Admin Instructions: Swallow tablet whole.  Do not crush, chew, or split.<BR>Do not exceed 4 grams of aspirin in a 24 hr period.<BR><BR>If given for pain, use the following pain scale: <BR>Mild Pain = Pain Score of 1-3, CPOT 1-2<BR>Moderate Pain = Pain Score of 4-6, CPOT 3-4<BR>Severe Pain = Pain Score of 7-10, CPOT 5-8    Route: Oral    atorvastatin (LIPITOR) tablet 40 mg 40 mg Nightly 12/19/2019     Admin Instructions: Avoid grapefruit juice.    Route: Oral    ceFAZolin in 0.9% normal saline (ANCEF) IVPB solution 2 g (Completed) 2 g Once 12/19/2019 12/19/2019    Admin Instructions: Administer Within 1 Hour of Surgical Incision.<BR>Redose 4 Hours From Pre-Op Dose if Procedure Ongoing or Blood Loss Greater Than 1.5 L<BR>Caution: Look alike/sound alike drug alert.  Refrigerate    Route: Intravenous    HYDROcodone-acetaminophen (NORCO) 7.5-325 MG per  "tablet 1 tablet 1 tablet Every 4 Hours PRN 12/19/2019 12/29/2019    Admin Instructions: [DAFNE]<BR><BR>Do not exceed 4 grams of acetaminophen in a 24 hr period.<BR><BR>If given for pain, use the following pain scale: <BR>Mild Pain = Pain Score of 1-3, CPOT 1-2<BR>Moderate Pain = Pain Score of 4-6, CPOT 3-4<BR>Severe Pain = Pain Score of 7-10, CPOT 5-8    Route: Oral    HYDROmorphone (DILAUDID) injection solution 1 mg 1 mg Every 2 Hours PRN 12/19/2019 12/29/2019    Route: Intravenous    Linked Group 1:  \"And\" Linked Group Details        naloxone (NARCAN) injection 0.4 mg 0.4 mg Every 5 Minutes PRN 12/19/2019     Admin Instructions: If respiratory rate is less than 8 breaths/minute or patient is difficult to arouse stop any narcotics and contact physician. <BR>Administer slow IV push. Repeat as ordered until patient's respiratory rate is greater than 12 breaths/minute.    Route: Intravenous    Linked Group 1:  \"And\" Linked Group Details        ondansetron (ZOFRAN) injection 4 mg 4 mg Every 6 Hours PRN 12/19/2019     Route: Intravenous    Linked Group 2:  \"Or\" Linked Group Details        ondansetron (ZOFRAN) tablet 4 mg 4 mg Every 6 Hours PRN 12/19/2019     Admin Instructions: If BOTH ondansetron (ZOFRAN) and promethazine (PHENERGAN) are ordered use ondansetron first and THEN promethazine IF ondansetron is ineffective.    Route: Oral    Linked Group 2:  \"Or\" Linked Group Details        oxyCODONE-acetaminophen (PERCOCET)  MG per tablet 1 tablet (Completed) 1 tablet Once As Needed 12/19/2019 12/19/2019    Admin Instructions: May not be given within 30 minutes following any IV Morphine or Dilaudid given in PACU. After switching to PO pain medicine, no additional IV Morphine or Dilaudid may be given.    Route: Oral    sodium chloride 0.9 % bolus 1,000 mL (Completed) 1,000 mL Once 12/19/2019 12/19/2019    Route: Intravenous    sodium chloride 0.9 % infusion 100 mL/hr Continuous 12/19/2019     Route: Intravenous    " atropine sulfate injection 0.5 mg (Discontinued) 0.5 mg Once As Needed 12/19/2019 12/19/2019    Route: Intravenous    Reason for Discontinue: Patient Transfer    bivalirudin (ANGIOMAX) 250 mg/50 mL (5 mg/mL) infusion MBP (Discontinued) 0.25 mg/kg/hr × 86.1 kg Continuous 12/19/2019 12/19/2019    Admin Instructions: Break seal and mix to activate vial before use    Route: Intravenous    buffered lidocaine syringe 0.5 mL (Discontinued) 0.5 mL Once As Needed 12/19/2019 12/19/2019    Route: Intradermal    Reason for Discontinue: Patient Transfer    buffered lidocaine syringe 0.5 mL (Discontinued) 0.5 mL Once As Needed 12/19/2019 12/19/2019    Route: Injection    Reason for Discontinue: Patient Transfer    dextrose (D50W) 25 g/ 50mL Intravenous Solution 12.5 g (Discontinued) 12.5 g As Needed 12/19/2019 12/19/2019    Admin Instructions: Recheck blood glucose in 30 minutes, if still below 75 repeat as needed.    Route: Intravenous    Reason for Discontinue: Patient Transfer    fentaNYL citrate (PF) (SUBLIMAZE) injection 25 mcg (Discontinued) 25 mcg Every 5 Minutes PRN 12/19/2019 12/19/2019    Admin Instructions: Maximum total dose of fentanyl is 100 mcg.<BR>If given for pain, use the following pain scale:<BR>Mild Pain = Pain Score of 1-3, CPOT 1-2<BR>Moderate Pain = Pain Score of 4-6, CPOT 3-4<BR>Severe Pain = Pain Score of 7-10, CPOT 5-8    Route: Intravenous    Reason for Discontinue: Patient Transfer    fentaNYL citrate (PF) (SUBLIMAZE) injection 25 mcg (Discontinued) 25 mcg As Needed 12/19/2019 12/19/2019    Admin Instructions: Maximum total dose of fentanyl is 100 mcg.<BR>If given for pain, use the following pain scale:<BR>Mild Pain = Pain Score of 1-3, CPOT 1-2<BR>Moderate Pain = Pain Score of 4-6, CPOT 3-4<BR>Severe Pain = Pain Score of 7-10, CPOT 5-8    Route: Intravenous    Reason for Discontinue: Patient Transfer    ibuprofen (ADVIL,MOTRIN) tablet 600 mg (Discontinued) 600 mg Once As Needed 12/19/2019 12/19/2019  "   Admin Instructions: If given for pain, use the following pain scale:<BR>Mild Pain = Pain Score of 1-3, CPOT 1-2<BR>Moderate Pain = Pain Score of 4-6, CPOT 3-4<BR>Severe Pain = Pain Score of 7-10, CPOT 5-8    Route: Oral    Reason for Discontinue: Patient Transfer    iopamidol (ISOVUE-300) 61 % injection (Discontinued)  As Needed 12/19/2019 12/19/2019    Reason for Discontinue: Patient Discharge    ipratropium-albuterol (DUO-NEB) nebulizer solution 3 mL (Discontinued) 3 mL Once As Needed 12/19/2019 12/19/2019    Route: Nebulization    Reason for Discontinue: Patient Transfer    labetalol (NORMODYNE,TRANDATE) injection 5 mg (Discontinued) 5 mg Every 5 Minutes PRN 12/19/2019 12/19/2019    Admin Instructions: Hold for heart rate less than 60.<BR>Give by slow IV Push each 20mg (or less) over 2 minutes    Route: Intravenous    Reason for Discontinue: Patient Transfer    lactated ringers infusion 1,000 mL (Discontinued) 1,000 mL Continuous 12/19/2019 12/19/2019    Route: Intravenous    lactated ringers infusion 1,000 mL (Discontinued) 1,000 mL Continuous 12/19/2019 12/19/2019    Route: Intravenous    lactated ringers infusion (Discontinued) 9 mL/hr Continuous 12/19/2019 12/19/2019    Route: Intravenous    metoclopramide (REGLAN) injection 5 mg (Discontinued) 5 mg Every 5 Minutes PRN 12/19/2019 12/19/2019    Route: Intravenous    Reason for Discontinue: Patient Transfer    midazolam (VERSED) injection 1 mg (Discontinued) 1 mg Every 5 Minutes PRN 12/19/2019 12/19/2019    Admin Instructions: Max 4 mg of midazolam TOTAL<BR>    Route: Intravenous    Reason for Discontinue: Patient Transfer    Linked Group 3:  \"Or\" Linked Group Details        midazolam (VERSED) injection 2 mg (Discontinued) 2 mg Every 5 Minutes PRN 12/19/2019 12/19/2019    Admin Instructions: Max 4 mg of midazolam TOTAL<BR>    Route: Intravenous    Reason for Discontinue: Patient Transfer    Linked Group 3:  \"Or\" Linked Group Details        naloxone (NARCAN) " injection 0.4 mg (Discontinued) 0.4 mg As Needed 12/19/2019 12/19/2019    Route: Intravenous    Reason for Discontinue: Patient Transfer    nitroglycerin 20 mcg/mL 10 mL syringe (Discontinued)  As Needed 12/19/2019 12/19/2019    Reason for Discontinue: Patient Discharge    ondansetron (ZOFRAN) injection 4 mg (Discontinued) 4 mg Once As Needed 12/19/2019 12/19/2019    Admin Instructions: If BOTH ondansetron (ZOFRAN) and promethazine (PHENERGAN) are ordered use ondansetron first and THEN promethazine IF ondansetron is ineffective.    Route: Intravenous    Reason for Discontinue: Patient Transfer    oxyCODONE-acetaminophen (PERCOCET) 7.5-325 MG per tablet 2 tablet (Discontinued) 2 tablet Every 4 Hours PRN 12/19/2019 12/19/2019    Admin Instructions: May not be given within 30 minutes following any IV Morphine or Dilaudid given in PACU.    Route: Oral    Reason for Discontinue: Patient Transfer    sodium chloride 0.9 % flush 10 mL (Discontinued) 10 mL As Needed 12/19/2019 12/19/2019    Route: Intravenous    Reason for Discontinue: Patient Transfer    sodium chloride 0.9 % flush 10 mL (Discontinued) 10 mL Every 12 Hours Scheduled 12/19/2019 12/19/2019    Route: Intravenous    Reason for Discontinue: Patient Transfer    sodium chloride 0.9 % flush 10 mL (Discontinued) 10 mL As Needed 12/19/2019 12/19/2019    Route: Intravenous    Reason for Discontinue: Patient Transfer    sodium chloride 0.9 % flush 3 mL (Discontinued) 3 mL As Needed 12/19/2019 12/19/2019    Route: Intravenous    Reason for Discontinue: Patient Transfer    sodium chloride 1,000 mL with bacitracin 50,000 Units irrigation (Discontinued)  As Needed 12/19/2019 12/19/2019    Reason for Discontinue: Patient Discharge    sodium chloride 500 mL with heparin (porcine) 5,000 Units mixture (Discontinued)  As Needed 12/19/2019 12/19/2019    Reason for Discontinue: Patient Discharge    thrombin topical (Discontinued)  As Needed 12/19/2019 12/19/2019    Reason for  Discontinue: Patient Discharge            Lab Results (last 24 hours)     Procedure Component Value Units Date/Time    CBC & Differential [[id number]] Collected:  12/20/19 0456    Specimen:  Blood Updated:  12/20/19 0514    Narrative:       The following orders were created for panel order CBC & Differential.  Procedure                               Abnormality         Status                     ---------                               -----------         ------                     CBC Auto Differential[[id number]]        Abnormal            Final result                 Please view results for these tests on the individual orders.    CBC Auto Differential [[id number]]  (Abnormal) Collected:  12/20/19 0456    Specimen:  Blood Updated:  12/20/19 0514     WBC 12.55 10*3/mm3      RBC 4.05 10*6/mm3      Hemoglobin 12.0 g/dL      Hematocrit 36.3 %      MCV 89.6 fL      MCH 29.6 pg      MCHC 33.1 g/dL      RDW 14.2 %      RDW-SD 46.5 fl      MPV 10.0 fL      Platelets 198 10*3/mm3      Neutrophil % 79.6 %      Lymphocyte % 12.1 %      Monocyte % 7.6 %      Eosinophil % 0.0 %      Basophil % 0.2 %      Immature Grans % 0.5 %      Neutrophils, Absolute 10.00 10*3/mm3      Lymphocytes, Absolute 1.52 10*3/mm3      Monocytes, Absolute 0.95 10*3/mm3      Eosinophils, Absolute 0.00 10*3/mm3      Basophils, Absolute 0.02 10*3/mm3      Immature Grans, Absolute 0.06 10*3/mm3      nRBC 0.0 /100 WBC     Basic Metabolic Panel [[id number]] Collected:  12/20/19 0456    Specimen:  Blood Updated:  12/20/19 0509    aPTT [[id number]]  (Abnormal) Collected:  12/20/19 0244    Specimen:  Blood Updated:  12/20/19 0310     PTT 50.1 seconds     aPTT [[id number]]  (Abnormal) Collected:  12/19/19 2252    Specimen:  Blood Updated:  12/19/19 2352     PTT 74.9 seconds     Blood Gas, Arterial With Co-Ox [[id number]]  (Abnormal) Collected:  12/19/19 1615    Specimen:  Arterial Blood Updated:  12/19/19 1629     Site Arterial Line     Larry's Test N/A     pH,  Arterial 7.333 pH units      Comment: 84 Value below reference range        pCO2, Arterial 42.1 mm Hg      pO2, Arterial 451.0 mm Hg      Comment: 83 Value above reference range        HCO3, Arterial 22.3 mmol/L      Base Excess, Arterial -3.5 mmol/L      Comment: 84 Value below reference range        O2 Saturation, Arterial 100.0 %      Comment: 83 Value above reference range        Hemoglobin, Blood Gas 14.5 g/dL      Hematocrit, Blood Gas 44.3 %      Oxyhemoglobin 97.6 %      Methemoglobin 1.10 %      Carboxyhemoglobin 1.3 %      A-a Gradiant 221.2 mmHg      Temperature 37.0 C      Sodium, Arterial 138 mmol/L      Potassium, Arterial 5.2 mmol/L      Comment: 83 Value above reference range        Ionized Calcium 4.51 mg/dL      Comment: 84 Value below reference range        Barometric Pressure for Blood Gas 761 mmHg      Modality Ventilator     FIO2 100 %      Ventilator Mode NA     Note --     Collected by [facility]     Comment: Meter: [id number]     :  [id number]        pH, Temp Corrected --     pCO2, Temperature Corrected --     pO2, Temperature Corrected --        Imaging Results (Last 24 Hours)     Procedure Component Value Units Date/Time    IR Angiogram Extremity [[id number]] Resulted:  12/19/19 1407     Updated:  12/19/19 1852    FL C Arm During Surgery [[id number]] Resulted:  12/19/19 1852     Updated:  12/19/19 1852        Orders (last 24 hrs)      Start     Ordered    12/20/19 0600  Basic Metabolic Panel  Morning Draw      12/19/19 1959 12/20/19 0600  CBC & Differential  Morning Draw      12/19/19 1959 12/20/19 0600  CBC Auto Differential  PROCEDURE ONCE      12/20/19 0001    12/20/19 0240  aPTT  Timed      12/20/19 0009    12/20/19 0000  Strict Intake and Output  Every 8 Hours      12/19/19 1959 12/19/19 2240  aPTT  Once      12/19/19 2159 12/19/19 2157  aPTT  Timed,   Status:  Canceled      12/19/19 2156 12/19/19 2133  Adjust Anticoagulant Infusion Rate Based on aPTT  & Creatinine Clearance Using Nomogram  Continuous      12/19/19 2133 12/19/19 2133  Other Anticoagulant Medications Should Be Discontinued Prior to Initiation of the Protocol  Once      12/19/19 2133 12/19/19 2133  Stop Anticoagulant Infusion if Bleeding Occurs  Continuous      12/19/19 2133 12/19/19 2133  Notify Provider if Bleeding Occurs or Patient Not Therapeutic Within 24 Hours  Until Discontinued      12/19/19 2133 12/19/19 2133  RN to Order aPTT 4 Hours After Beginning Anticoagulant Infusion  Once      12/19/19 2133 12/19/19 2100  atorvastatin (LIPITOR) tablet 40 mg  Nightly      12/19/19 1959 12/19/19 2100  sodium chloride 0.9 % bolus 1,000 mL  Once      12/19/19 2002 12/19/19 2100  sodium chloride 0.9 % infusion  Continuous      12/19/19 2002 12/19/19 2045  aspirin EC tablet 81 mg  Daily      12/19/19 1959 12/19/19 2000  Code Status and Medical Interventions:  Continuous      12/19/19 1959 12/19/19 2000  Vital Signs and Neurovascular Checks - As Specified  Until Discontinued      12/19/19 1959 12/19/19 2000  Cardiac Monitoring  Continuous      12/19/19 1959 12/19/19 2000  Arterial Line Monitoring  Every Shift      12/19/19 1959 12/19/19 2000  Elevate HOB 30 Degrees  Continuous      12/19/19 1959 12/19/19 2000  Check Operative Site With Vitals  Continuous      12/19/19 1959 12/19/19 2000  Monitor Drain Output  Every Shift      12/19/19 1959 12/19/19 2000  Provide &  Stroke Educational Material With Patient / Family  Once      12/19/19 1959 12/19/19 2000  Notify Physician  Until Discontinued      12/19/19 1959 12/19/19 2000  Oxygen Therapy- Nasal Cannula; Titrate for SPO2: 90% - 95%  Continuous      12/19/19 1959 12/19/19 2000  Continuous Pulse Oximetry  Continuous      12/19/19 1959 12/19/19 2000  Incentive Spirometry  Every 2 Hours While Awake      12/19/19 1959 12/19/19 2000  Advance Diet as Tolerated  Until Discontinued      12/19/19 1959     12/19/19 2000  Bed Rest  Until Discontinued      12/19/19 1959 12/19/19 2000  Continue Indwelling Urinary Catheter  Once      12/19/19 1959 12/19/19 2000  Assess Need for Indwelling Urinary Catheter - Follow Removal Protocol  Continuous     Comments:  Indwelling Urinary Catheter Removal Criteria  Discontinue Indwelling Urinary Catheter Unless One of the Following is Present:  Urinary Retention or Obstruction  Chronic Urinary Catheter Use  End of Life  Critical Illness with Strict I/O   Tract or Abdominal Surgery  Stage 3/4 Sacral / Perineal Wound  Required Activity Restriction: Trauma  Required Activity Restriction: Spine Surgery  If Patient is Being Followed by Urology Contact Them PRIOR to Removal  Do Not Remove Indwelling Urinary Catheter Order is Present with a CLINICAL REASON to Maintain the Catheter. Provider is Required to Include a Clinical Reason to Maintain a Urinary Catheter    Patient Admitted With Indwelling Urinary Catheter (Not Placed at [facility])  Assess for Continued Need & Document Medical Necessity  If Infection is Suspected, Contact the Provider        12/19/19 1959 12/19/19 2000  Urinary Catheter Care  Every Shift      12/19/19 1959 12/19/19 2000  Diet Regular  Diet Effective Now      12/19/19 1959 12/19/19 1959  HYDROcodone-acetaminophen (NORCO) 7.5-325 MG per tablet 1 tablet  Every 4 Hours PRN      12/19/19 1959 12/19/19 1959  HYDROmorphone (DILAUDID) injection solution 1 mg  Every 2 Hours PRN      12/19/19 1959 12/19/19 1959  naloxone (NARCAN) injection 0.4 mg  Every 5 Minutes PRN      12/19/19 1959 12/19/19 1959  ondansetron (ZOFRAN) tablet 4 mg  Every 6 Hours PRN      12/19/19 1959 12/19/19 1959  ondansetron (ZOFRAN) injection 4 mg  Every 6 Hours PRN      12/19/19 1959 12/19/19 1959  Oxygen Therapy- Blow by - Humidified; Titrate for SPO2: equal to or greater than, 96%, per policy  Continuous,   Status:  Canceled      12/19/19 1959 12/19/19 1959   Pulse Oximetry, Continuous  Continuous,   Status:  Canceled      12/19/19 1959 12/19/19 1959  Assess Need for Indwelling Urinary Catheter - Follow Removal Protocol  Continuous,   Status:  Canceled     Comments:  Indwelling Urinary Catheter Removal Criteria  Discontinue Indwelling Urinary Catheter Unless One of the Following is Present:  Urinary Retention or Obstruction  Chronic Urinary Catheter Use  End of Life  Critical Illness with Strict I/O   Tract or Abdominal Surgery  Stage 3/4 Sacral / Perineal Wound  Required Activity Restriction: Trauma  Required Activity Restriction: Spine Surgery  If Patient is Being Followed by Urology Contact Them PRIOR to Removal  Do Not Remove Indwelling Urinary Catheter Order is Present with a CLINICAL REASON to Maintain the Catheter. Provider is Required to Include a Clinical Reason to Maintain a Urinary Catheter    Patient Admitted With Indwelling Urinary Catheter (Not Placed at [facility])  Assess for Continued Need & Document Medical Necessity  If Infection is Suspected, Contact the Provider        12/19/19 1959 12/19/19 1959  Urinary Catheter Care  Every Shift,   Status:  Canceled      12/19/19 1959 12/19/19 1911  Inpatient Admission  Once      12/19/19 1912 12/19/19 1911  VTE Prophylaxis Not Indicated:  Once      12/19/19 1912 12/19/19 1859  Vital Signs Every 5 Minutes for 15 Minutes, Every 15 Minutes Thereafter.  Continuous,   Status:  Canceled      12/19/19 1858 12/19/19 1859  Call Anesthesiologist For Additional IV Fluid Bolus For Hypotension / Tachycardia  Continuous,   Status:  Canceled      12/19/19 1858 12/19/19 1859  Notify Anesthesia of Any Acute Changes in Patient Condition  Until Discontinued,   Status:  Canceled      12/19/19 1858 12/19/19 1859  Notify Anesthesia for Unrelieved Pain  Until Discontinued,   Status:  Canceled      12/19/19 1858 12/19/19 1859  Once Discharge Criteria to Floor Met, Follow Surgeon Orders  Continuous,    Status:  Canceled      12/19/19 1858 12/19/19 1859  Discharge Patient From PACU When Discharge Criteria Met  Continuous,   Status:  Canceled      12/19/19 1858 12/19/19 1858  Apply Warming [place]  As Needed,   Status:  Canceled     Comments:  For a recorded temp of  <36.9 C, Recovery Only    12/19/19 1858 12/19/19 1858  oxyCODONE-acetaminophen (PERCOCET)  MG per tablet 1 tablet  Once As Needed      12/19/19 1858 12/19/19 1858  oxyCODONE-acetaminophen (PERCOCET) 7.5-325 MG per tablet 2 tablet  Every 4 Hours PRN,   Status:  Discontinued      12/19/19 1858 12/19/19 1858  naloxone (NARCAN) injection 0.4 mg  As Needed,   Status:  Discontinued      12/19/19 1858 12/19/19 1858  ondansetron (ZOFRAN) injection 4 mg  Once As Needed,   Status:  Discontinued      12/19/19 1858 12/19/19 1858  ibuprofen (ADVIL,MOTRIN) tablet 600 mg  Once As Needed,   Status:  Discontinued      12/19/19 1858 12/19/19 1858  fentaNYL citrate (PF) (SUBLIMAZE) injection 25 mcg  As Needed,   Status:  Discontinued      12/19/19 1858 12/19/19 1858  labetalol (NORMODYNE,TRANDATE) injection 5 mg  Every 5 Minutes PRN,   Status:  Discontinued      12/19/19 1858 12/19/19 1858  atropine sulfate injection 0.5 mg  Once As Needed,   Status:  Discontinued      12/19/19 1858 12/19/19 1858  ipratropium-albuterol (DUO-NEB) nebulizer solution 3 mL  Once As Needed,   Status:  Discontinued      12/19/19 1858 12/19/19 1858  metoclopramide (REGLAN) injection 5 mg  Every 5 Minutes PRN,   Status:  Discontinued      12/19/19 1858 12/19/19 1825  FL C Arm During Surgery  1 Time Imaging      12/19/19 1824 12/19/19 1814  argatroban 250 mg in sodium chloride 0.9 % 250 mL (1 mg/mL) infusion  Titrated      12/19/19 1812 12/19/19 1749  nitroglycerin 20 mcg/mL 10 mL syringe  As Needed,   Status:  Discontinued      12/19/19 1749    12/19/19 1730  bivalirudin (ANGIOMAX) 250 mg/50 mL (5 mg/mL) infusion MBP  Continuous,   Status:   Discontinued      12/19/19 1728    12/19/19 1630  Blood Gas, Arterial With Co-Ox  Once      12/19/19 1615    12/19/19 1616  Blood Gas, Arterial  Once      12/19/19 1615    12/19/19 1421  Insert Indwelling Urinary Catheter  Once,   Status:  Canceled      12/19/19 1420    12/19/19 1421  sodium chloride 500 mL with heparin (porcine) 5,000 Units mixture  As Needed,   Status:  Discontinued      12/19/19 1421    12/19/19 1421  iopamidol (ISOVUE-300) 61 % injection  As Needed,   Status:  Discontinued      12/19/19 1421    12/19/19 1421  thrombin topical  As Needed,   Status:  Discontinued      12/19/19 1421    12/19/19 1420  sodium chloride 1,000 mL with bacitracin 50,000 Units irrigation  As Needed,   Status:  Discontinued      12/19/19 1420    12/19/19 1308  IR Angiogram Extremity  1 Time Imaging      12/19/19 1307    12/19/19 1237  sodium chloride 0.9 % flush 10 mL  Every 12 Hours Scheduled,   Status:  Discontinued      12/19/19 1235    12/19/19 1237  lactated ringers infusion  Continuous,   Status:  Discontinued      12/19/19 1235    12/19/19 1235  Oxygen Therapy- Nasal Cannula; Titrate for SPO2: equal to or greater than, 90%  Continuous,   Status:  Canceled      12/19/19 1235    12/19/19 1235  Pulse Oximetry, Continuous  Continuous,   Status:  Canceled      12/19/19 1235    12/19/19 1235  Insert Peripheral IV  Once,   Status:  Canceled      12/19/19 1235    12/19/19 1235  Saline Lock & Maintain IV Access  Continuous      12/19/19 1235    12/19/19 1234  Vital Signs - Per Anesthesia Protocol  As Needed,   Status:  Canceled      12/19/19 1235    12/19/19 1234  sodium chloride 0.9 % flush 10 mL  As Needed,   Status:  Discontinued      12/19/19 1235    12/19/19 1234  buffered lidocaine syringe 0.5 mL  Once As Needed,   Status:  Discontinued      12/19/19 1235    12/19/19 1234  fentaNYL citrate (PF) (SUBLIMAZE) injection 25 mcg  Every 5 Minutes PRN,   Status:  Discontinued      12/19/19 1235    12/19/19 1234  midazolam  (VERSED) injection 1 mg  Every 5 Minutes PRN,   Status:  Discontinued      12/19/19 1235    12/19/19 1234  midazolam (VERSED) injection 2 mg  Every 5 Minutes PRN,   Status:  Discontinued      12/19/19 1235    12/19/19 1234  dextrose (D50W) 25 g/ 50mL Intravenous Solution 12.5 g  As Needed,   Status:  Discontinued      12/19/19 1235    12/19/19 0916  lactated ringers infusion 1,000 mL  Continuous,   Status:  Discontinued      12/19/19 0914    12/19/19 0916  lactated ringers infusion 1,000 mL  Continuous,   Status:  Discontinued      12/19/19 0914    12/19/19 0915  ceFAZolin in 0.9% normal saline (ANCEF) IVPB solution 2 g  Once      12/19/19 0913 12/19/19 0914  Type & Screen  STAT      12/19/19 0913 12/19/19 0914  Follow Anesthesia Guidelines / Standing Orders  Once,   Status:  Canceled      12/19/19 0913    12/19/19 0914  Chlorhexidine Skin Prep  Until Discontinued,   Status:  Canceled     Comments:  Chlorhexidine Skin wipes and instructions for all patients having a procedure requiring an outward incision if not allergic.  If allergic, give antibacterial skin wipes and instructions.  Do not use for facial cases or on any mucus membranes.    12/19/19 0913 12/19/19 0914  Clip Bilateral Groins  Once,   Status:  Canceled     Comments:  For Aortic Bypass / Aortic Stent Graft - Clip Abdomen & Bilateral Groins  For Fem-Pop-Tib Bypass - Clip Bilateral Groins & Leg    12/19/19 0913 12/19/19 0914  Insert Arterial Line  Once,   Status:  Canceled      12/19/19 0913    12/19/19 0914  Obtain Informed Consent  Once,   Status:  Canceled      12/19/19 0913    12/19/19 0914  Please Insert a Second Peripheral IV If Indicated For Procedure (All DaVinci Cases, Gastric Bypass, Sleeve Surgery, AAA, Any Vascular Bypass, Carotid, Sigmoidectomy, Colectomy (Lap Assisted), Colon Resection, M[place], Exploratory Laparotomy, Spinal...  Once,   Status:  Canceled     Comments:  Please Insert a Second Peripheral IV If Indicated For  Procedure (All DaVinci Cases, Gastric Bypass, Sleeve Surgery, AAA, Any Vascular Bypass, Carotid, Sigmoidectomy, Colectomy (Lap Assisted), Colon Resection, Mike, Exploratory Laparotomy, Spinal Fusion, Craniotomy, Thoracotomy, or Mediastinoscopy.    12/19/19 0914 12/19/19 0914  Insert Peripheral IV  Once,   Status:  Canceled      12/19/19 0914 12/19/19 0914  Maintain IV Access  Continuous,   Status:  Canceled      12/19/19 0914    12/19/19 0914  Insert Peripheral IV  Once,   Status:  Canceled      12/19/19 0914    12/19/19 0914  Maintain IV Access  Continuous,   Status:  Canceled      12/19/19 0914 12/19/19 0913  sodium chloride 0.9 % flush 10 mL  As Needed,   Status:  Discontinued      12/19/19 0914 12/19/19 0913  buffered lidocaine syringe 0.5 mL  Once As Needed,   Status:  Discontinued      12/19/19 0914 12/19/19 0913  sodium chloride 0.9 % flush 3 mL  As Needed,   Status:  Discontinued      12/19/19 0914    Unscheduled  RN to Order aPTT 4 Hours After Each Infusion Change Until 4 Consecutive Therapeutic aPTTs Are Achieved  As Needed      12/19/19 2133    Unscheduled  RN to Order aPTT Every 24 Hours Once 2 Consecutive aPTTs Are Within Therapeutic Range  As Needed      12/19/19 2133                Ventilator/Non-Invasive Ventilation Settings (From admission, onward)    None           Operative/Procedure Notes (last 24 hours) (Notes from 12/19/19 0521 through 12/20/19 0521)      Karson Rivera MD at 12/19/19 125        Fiona [place]  12/19/2019     PREOPERATIVE DIAGNOSIS: Atherosclerosis of native artery of right lower extremity with intermittent claudication (CMS/HCC) [I70.211]     POSTOPERATIVE DIAGNOSIS: Post-Op Diagnosis Codes:     * Atherosclerosis of native artery of right lower extremity with intermittent claudication (CMS/HCC) [I70.211]     PROCEDURE PERFORMED:  1.  Ultrasound-guided left common femoral artery access  2.  Placement of catheter in abdominal aorta  3.  Aortoiliac  angiogram  4.  Redo right groin exploration  5.  Thrombectomy of the right superficial femoral artery using a #3 Reid, a #4 Reid, and a #4 over-the-wire Reid balloon  6.  Angiogram of the right lower extremity  7.  Angioplasty of the right popliteal artery using a 4 x 150 mm Medtronic drug-coated balloon  8.  Angioplasty of the right superficial femoral artery using a 5 x 150 mm and 5 x 80 mm Medtronic drug-coated balloon  9.  Stenting of the distal right SFA using a 6 x 100 mm ever flex self-expanding stent  10.  Stenting of the above-knee popliteal artery using a 6 x 100 mm ever flex self-expanding stent  11.  Completion angiogram  12.  Deployment of minx closure device in the left common femoral artery  13.  Radiologic supervision and interpretation     SURGEON: Karson Rivera MD  ASSISTANT: Alexandru James DO     ANESTHESIA: General.    PREPARATION: Routine.    STAFF: Circulator: Aylin Crane RN; Unique Rosenberg RN  Scrub Person: Joe Britt; Karley Segura; Мария Can; Mayra Bains  Vascular Radiology Technician: Acacia Ardon    Estimated Blood Loss: 200ml    SPECIMENS: None    COMPLICATIONS: None apparent    INDICATIONS: Fiona Laguerre is a 44 y.o. male who initially had presented back in August with blue toe syndrome.  He ultimately was found to have thrombus at the aortic bifurcation as well as into the proximal bilateral common iliac arteries.  As such she underwent angiogram with aortoiliac stent placement for exclusion of the thrombus and did well.  He had return to the office 1 month post procedure and underwent a CTA that showed the graft in good position and was widely patent.  He had palpable pulses bilaterally at that time in the feet and denied any claudication.  His blue toe on the right foot had also significantly improved.  However he called the office last week and reported new onset claudication at short distances to the right lower extremity.  Ultimately  CTA was done which showed a patent aortoiliac stent graft and iliac system however he appeared to have occlusion of the right SFA with thrombus present.  As such she was brought today to the operating room for angiogram as well as thrombectomy of the right lower extremity. The indications, risks, and possible complications of the procedure were explained to the patient, who voiced understanding and wished to proceed with surgery.     PROCEDURE IN DETAIL: The patient was taken to the operating room and placed on the operating table in a supine position. After neural anesthesia was obtained, the entire right lower extremity as well as the left groin where prepped and draped in a sterile manner.  The left common femoral artery was then accessed under direct ultrasound guidance with a micropuncture needle.  Microwire was advanced into the abdominal aorta.  Micro sheath was then placed.  Glidewire advantage was then introduced and placed in the proximal abdominal aorta.  The micro sheath was then exchanged for a short 6 [place]araguan sheath.  2000 units of IV heparin was then given. A Omni Flush catheter was then placed in the abdominal aorta and aortoiliac angiogram performed.  Showed a patent abdominal aorta above the stent graft and patency of the aortoiliac stent graft itself.  Just distal to the iliac portion of the stent on the right there was noted to be stenosis of the right external iliac artery starting at the iliac bifurcation.  The visualized portion of the common femoral artery did appear patent.  At this point given that there was no thrombus in the SFA decision was made to focus on the right groin and perform a cutdown.  As such an oblique incision was made in the right groin following the previously placed incision using a #15 blade.  There was significant scar tissue noted from the previous groin exploration.  Dissection was carried down to the level of the inguinal ligament using electrocautery and the  inguinal ligament was identified.  Just inferior to this the common femoral artery was identified.  Given the significant scar tissue significant care was taken to dissect the common femoral artery circumferentially at this point just beneath the inguinal ligament and it was controlled with a vessel loop.  Dissection was then carried along the artery inferiorly freeing it from all of its local attachments.  Any crossing veins were ligated between hemoclips.  The distal common femoral artery was then controlled at the level of the bifurcation with a Silastic vessel loop.  At this point an additional 4000 units of IV heparin were given.  Throughout the remainder of the case 4000 units of additional IV heparin were given.  After 3 minutes the proximal common femoral artery was controlled using a C-clamp.  The previously placed Prolene sutures used to close the arteriotomy were then removed and a transverse arteriotomy was made in the mid common femoral artery.  There was noted to be minimal black bleeding.  At this point a #3 Reid catheter was advanced down into the SFA and thrombectomy performed returning fresh appearing thrombus.  Multiple passes were made with a #3 Reid and also then a #4 Reid was used.  Following this angiogram directly down the SFA through the arteriotomy was performed and it did now show patency of the SFA however the stenotic appearing lesion as well as more proximally some dissection to the intima of the mid and distal SFA..  Distally there appeared to be outflow to the level of the foot mainly via a posterior tibial artery as well as a peroneal.  The anterior tibial artery appeared to have sluggish flow proximally and then appeared to occlude somewhat in its midportion.  However the popliteal artery also appeared to be somewhat stenotic and small in caliber.  At this point decision was made to perform angioplasty of the stenotic popliteal artery as well as the distal SFA.  As such a  Glidewire advantage was introduced into the arteriotomy in an antegrade fashion passed distally into the SFA.  A 6 Italian sheath was placed over the wire into the arteriotomy.  Using a Ricardo cross catheter the stenotic portion of the distal SFA was traversed and the wire and catheter were able to be placed in the distal popliteal artery.  Then over the wire a 4 x 150 mm Medtronic drug-eluting balloon was advanced over the wire and used to angioplasty the entire length of the popliteal artery as well as the distal SFA.  A second 5 x 150 mm Medtronic drug eluding balloon was used to angioplasty the mid SFA.  Following this completion angiogram did show improved patency of the popliteal artery as well as the SFA.  At this point attention was turned to addressing the more proximal stenosis in the external iliac artery.  As such the wire and sheath were removed from the distal SFA and were repositioned in a retrograde fashion into the external iliac artery.  The wire was able to be advanced into the abdominal aorta.  Then over the wire a 6 x 80 mm Medtronic drug-eluting balloon was used to perform angioplasty of the stenotic segment of the external iliac artery.  Completion retrograde angiogram showed no residual stenosis.  At this point we were satisfied with the result and no further intervention was deemed necessary.  The wire and sheath were removed and the artery forward and backward bled and flushed with heparinized saline.  The arteriotomy was then closed with a 5-0 Prolene suture in a running fashion.  At this point plan was for completion angiogram so the wire advantage was reintroduced into the left groin sheath and Omni Flush catheter was then placed just above the previously placed vein graft.  Angiogram from this point showed a widely patent stent graft as well as patency of the right external iliac artery with no significant residual stenosis.  Common femoral artery and proximal SFA appeared patent however  there was stenotic disease in the proximal SFA just after its origin.  Unfortunately distally there appeared to be reaccumulation of acute thrombus within the distal SFA despite the previous angioplasty as well as heparinization.  As such there was concern for possible HIT and so at this point and infusion of Angiomax was started.  The Glidewire advantage was then reintroduced and using the Omni Flush catheter and the Glidewire the bifurcation was traversed and the catheter and wire advanced to the common femoral artery.  The Omni Flush catheter was then removed and using a Ricardo cross catheter the wire was able to be advanced into the distal popliteal artery.  At this point the short 6 Belizean sheath was removed and a 6 Belizean by 45 cm destination sheath was advanced over the bifurcation and placed with its tip in the common femoral artery.  Again angiogram through the sheath showed reaccumulation of thrombus as well as residual stenosis in the distal SFA as well as popliteal arteries as well as some evidence of dissection in the distal SFA.  With such angioplasty was again performed first in the popliteal artery using the previously used 4 x 150 mm balloon.  The distal SFA was again angioplastied using the 5 x 150 mm balloon.  Given that there was also stenosis in the proximal SFA a 5 x 80 mm Medtronic drug-coated balloon was used to angioplasty the proximal SFA just after its origin.  There appeared to be areas of significant dissection and residual stenosis again in the popliteal as well as distal SFA.  As such a 6 x 100 mm ever flex self-expanding stent was placed in the distal SFA at the area of dissection in question and a second 6 x 100 mm stent was extended distally into the knee popliteal artery.  Completion angiogram at this point showed patency of the SFA and through the stents however there was noted to be stenosis at the proximal tibioperoneal trunk.  As such over the wire the previously used 4 x 150 mm  Medtronic balloon was used to angioplasty the tibioperoneal trunk with good result.  Finally completion angiogram via the sheath showed inline flow through the SFA, the popliteal artery, with runoff to the foot via posterior tibial and peroneal arteries.  At this point no further adding to her mention was deemed necessary.  It was pulled back over the wire to the level of the bifurcation and then the Glidewire was advanced into the abdominal aorta.  The long 6 Khmer sheath was then exchanged over the wire for a short 6 Khmer sheath and a minx closure device was deployed in the left common femoral artery.  Manual pressure was held for an additional 15 minutes.  The right groin incision was then copiously irrigated with antibiotic saline solution and hemostasis achieved with thrombin-soaked Gelfoam.  Hemostasis was noted to be excellent.  The groin was then closed in layers with 2-0 Vicryl for the femoral sheath and deep tissues, 3-0 Vicryl for deep dermal layer, and a running 4-0 subcuticular Monocryl stitch for the skin.  Sterile dressings were applied. The patient tolerated the procedure well. Sponge and needle counts were correct. The patient was then awakened and extubated in the operating room and taken to the recovery room in good condition.    Karson Rivera MD  Date: 12/19/2019 Time: 7:12 PM    Electronically signed by Karson Rivera MD at 12/19/19 8790       Physician Progress Notes (last 24 hours) (Notes from 12/19/19 0521 through 12/20/19 0521)    No notes of this type exist for this encounter.         Consult Notes (last 24 hours) (Notes from 12/19/19 0521 through 12/20/19 0521)    No notes of this type exist for this encounter.

## 2019-12-20 NOTE — OP NOTE
Fiona Laguerre  12/19/2019     PREOPERATIVE DIAGNOSIS: Atherosclerosis of native artery of right lower extremity with intermittent claudication (CMS/HCC) [I70.211]     POSTOPERATIVE DIAGNOSIS: Post-Op Diagnosis Codes:     * Atherosclerosis of native artery of right lower extremity with intermittent claudication (CMS/HCC) [I70.211]     PROCEDURE PERFORMED:  1.  Ultrasound-guided left common femoral artery access  2.  Placement of catheter in abdominal aorta  3.  Aortoiliac angiogram  4.  Redo right groin exploration  5.  Thrombectomy of the right superficial femoral artery using a #3 Reid, a #4 Reid, and a #4 over-the-wire Reid balloon  6.  Angiogram of the right lower extremity  7.  Angioplasty of the right popliteal artery using a 4 x 150 mm Medtronic drug-coated balloon  8.  Angioplasty of the right superficial femoral artery using a 5 x 150 mm and 5 x 80 mm Medtronic drug-coated balloon  9.  Stenting of the distal right SFA using a 6 x 100 mm ever flex self-expanding stent  10.  Stenting of the above-knee popliteal artery using a 6 x 100 mm ever flex self-expanding stent  11.  Completion angiogram  12.  Deployment of minx closure device in the left common femoral artery  13.  Radiologic supervision and interpretation     SURGEON: Karson Rivera MD  ASSISTANT: Alexandru James DO     ANESTHESIA: General.    PREPARATION: Routine.    STAFF: Circulator: Aylin Crane RN; Unique Rosenberg RN  Scrub Person: Joe Britt; Karley Segura; Мария Can; Mayra Bains  Vascular Radiology Technician: Acacia Ardon    Estimated Blood Loss: 200ml    SPECIMENS: None    COMPLICATIONS: None apparent    INDICATIONS: Fiona Laguerre is a 44 y.o. male who initially had presented back in August with blue toe syndrome.  He ultimately was found to have thrombus at the aortic bifurcation as well as into the proximal bilateral common iliac arteries.  As such she underwent angiogram with aortoiliac stent  placement for exclusion of the thrombus and did well.  He had return to the office 1 month post procedure and underwent a CTA that showed the graft in good position and was widely patent.  He had palpable pulses bilaterally at that time in the feet and denied any claudication.  His blue toe on the right foot had also significantly improved.  However he called the office last week and reported new onset claudication at short distances to the right lower extremity.  Ultimately CTA was done which showed a patent aortoiliac stent graft and iliac system however he appeared to have occlusion of the right SFA with thrombus present.  As such she was brought today to the operating room for angiogram as well as thrombectomy of the right lower extremity. The indications, risks, and possible complications of the procedure were explained to the patient, who voiced understanding and wished to proceed with surgery.     PROCEDURE IN DETAIL: The patient was taken to the operating room and placed on the operating table in a supine position. After neural anesthesia was obtained, the entire right lower extremity as well as the left groin where prepped and draped in a sterile manner.  The left common femoral artery was then accessed under direct ultrasound guidance with a micropuncture needle.  Microwire was advanced into the abdominal aorta.  Micro sheath was then placed.  Glidewire advantage was then introduced and placed in the proximal abdominal aorta.  The micro sheath was then exchanged for a short 6 Hungarian sheath.  2000 units of IV heparin was then given. A Omni Flush catheter was then placed in the abdominal aorta and aortoiliac angiogram performed.  Showed a patent abdominal aorta above the stent graft and patency of the aortoiliac stent graft itself.  Just distal to the iliac portion of the stent on the right there was noted to be stenosis of the right external iliac artery starting at the iliac bifurcation.  The visualized  portion of the common femoral artery did appear patent.  At this point given that there was no thrombus in the SFA decision was made to focus on the right groin and perform a cutdown.  As such an oblique incision was made in the right groin following the previously placed incision using a #15 blade.  There was significant scar tissue noted from the previous groin exploration.  Dissection was carried down to the level of the inguinal ligament using electrocautery and the inguinal ligament was identified.  Just inferior to this the common femoral artery was identified.  Given the significant scar tissue significant care was taken to dissect the common femoral artery circumferentially at this point just beneath the inguinal ligament and it was controlled with a vessel loop.  Dissection was then carried along the artery inferiorly freeing it from all of its local attachments.  Any crossing veins were ligated between hemoclips.  The distal common femoral artery was then controlled at the level of the bifurcation with a Silastic vessel loop.  At this point an additional 4000 units of IV heparin were given.  Throughout the remainder of the case 4000 units of additional IV heparin were given.  After 3 minutes the proximal common femoral artery was controlled using a C-clamp.  The previously placed Prolene sutures used to close the arteriotomy were then removed and a transverse arteriotomy was made in the mid common femoral artery.  There was noted to be minimal black bleeding.  At this point a #3 Reid catheter was advanced down into the SFA and thrombectomy performed returning fresh appearing thrombus.  Multiple passes were made with a #3 Reid and also then a #4 Reid was used.  Following this angiogram directly down the SFA through the arteriotomy was performed and it did now show patency of the SFA however the stenotic appearing lesion as well as more proximally some dissection to the intima of the mid and distal  SFA..  Distally there appeared to be outflow to the level of the foot mainly via a posterior tibial artery as well as a peroneal.  The anterior tibial artery appeared to have sluggish flow proximally and then appeared to occlude somewhat in its midportion.  However the popliteal artery also appeared to be somewhat stenotic and small in caliber.  At this point decision was made to perform angioplasty of the stenotic popliteal artery as well as the distal SFA.  As such a Glidewire advantage was introduced into the arteriotomy in an antegrade fashion passed distally into the SFA.  A 6 Belizean sheath was placed over the wire into the arteriotomy.  Using a Ricardo cross catheter the stenotic portion of the distal SFA was traversed and the wire and catheter were able to be placed in the distal popliteal artery.  Then over the wire a 4 x 150 mm Medtronic drug-eluting balloon was advanced over the wire and used to angioplasty the entire length of the popliteal artery as well as the distal SFA.  A second 5 x 150 mm Medtronic drug eluding balloon was used to angioplasty the mid SFA.  Following this completion angiogram did show improved patency of the popliteal artery as well as the SFA.  At this point attention was turned to addressing the more proximal stenosis in the external iliac artery.  As such the wire and sheath were removed from the distal SFA and were repositioned in a retrograde fashion into the external iliac artery.  The wire was able to be advanced into the abdominal aorta.  Then over the wire a 6 x 80 mm Medtronic drug-eluting balloon was used to perform angioplasty of the stenotic segment of the external iliac artery.  Completion retrograde angiogram showed no residual stenosis.  At this point we were satisfied with the result and no further intervention was deemed necessary.  The wire and sheath were removed and the artery forward and backward bled and flushed with heparinized saline.  The arteriotomy was then  closed with a 5-0 Prolene suture in a running fashion.  At this point plan was for completion angiogram so the wire advantage was reintroduced into the left groin sheath and Omni Flush catheter was then placed just above the previously placed vein graft.  Angiogram from this point showed a widely patent stent graft as well as patency of the right external iliac artery with no significant residual stenosis.  Common femoral artery and proximal SFA appeared patent however there was stenotic disease in the proximal SFA just after its origin.  Unfortunately distally there appeared to be reaccumulation of acute thrombus within the distal SFA despite the previous angioplasty as well as heparinization.  As such there was concern for possible HIT and so at this point and infusion of Angiomax was started.  The Glidewire advantage was then reintroduced and using the Omni Flush catheter and the Glidewire the bifurcation was traversed and the catheter and wire advanced to the common femoral artery.  The Omni Flush catheter was then removed and using a Ricardo cross catheter the wire was able to be advanced into the distal popliteal artery.  At this point the short 6 Arabic sheath was removed and a 6 Arabic by 45 cm destination sheath was advanced over the bifurcation and placed with its tip in the common femoral artery.  Again angiogram through the sheath showed reaccumulation of thrombus as well as residual stenosis in the distal SFA as well as popliteal arteries as well as some evidence of dissection in the distal SFA.  With such angioplasty was again performed first in the popliteal artery using the previously used 4 x 150 mm balloon.  The distal SFA was again angioplastied using the 5 x 150 mm balloon.  Given that there was also stenosis in the proximal SFA a 5 x 80 mm Medtronic drug-coated balloon was used to angioplasty the proximal SFA just after its origin.  There appeared to be areas of significant dissection and residual  stenosis again in the popliteal as well as distal SFA.  As such a 6 x 100 mm ever flex self-expanding stent was placed in the distal SFA at the area of dissection in question and a second 6 x 100 mm stent was extended distally into the knee popliteal artery.  Completion angiogram at this point showed patency of the SFA and through the stents however there was noted to be stenosis at the proximal tibioperoneal trunk.  As such over the wire the previously used 4 x 150 mm Medtronic balloon was used to angioplasty the tibioperoneal trunk with good result.  Finally completion angiogram via the sheath showed inline flow through the SFA, the popliteal artery, with runoff to the foot via posterior tibial and peroneal arteries.  At this point no further adding to her mention was deemed necessary.  It was pulled back over the wire to the level of the bifurcation and then the Glidewire was advanced into the abdominal aorta.  The long 6 Mozambican sheath was then exchanged over the wire for a short 6 Mozambican sheath and a minx closure device was deployed in the left common femoral artery.  Manual pressure was held for an additional 15 minutes.  The right groin incision was then copiously irrigated with antibiotic saline solution and hemostasis achieved with thrombin-soaked Gelfoam.  Hemostasis was noted to be excellent.  The groin was then closed in layers with 2-0 Vicryl for the femoral sheath and deep tissues, 3-0 Vicryl for deep dermal layer, and a running 4-0 subcuticular Monocryl stitch for the skin.  Sterile dressings were applied. The patient tolerated the procedure well. Sponge and needle counts were correct. The patient was then awakened and extubated in the operating room and taken to the recovery room in good condition.    Karson Rivera MD  Date: 12/19/2019 Time: 7:12 PM

## 2019-12-20 NOTE — PROGRESS NOTES
LOS: 1 day   Patient Care Team:  Manas Zhu MD as PCP - General (Family Medicine)    Chief Complaint: Right lower extremity ischemia    Subjective     Patient seen and examined at bedside.  He is postoperative day #1 status post aortoiliac angiogram as well as right groin cutdown with thrombectomy of the right SFA as well as right lower extremity angiogram with angioplasty of the right external iliac artery, SFA, popliteal artery, and tibioperoneal trunk as well as stenting of the right distal SFA and popliteal arteries.  During the case despite the use of heparin for anticoagulation he continued to make thrombus and so there was concern for HIT.  As such she was initially placed on Angiomax during the case and then transition to an argatroban drip which remains running.  The argatroban is being titrated based on PTTs.  He has remained stable in the ICU overnight.  Urine output was good.  He continues to have palpable posterior tibial pulses in the bilateral feet as well as Doppler signals at the bilateral DPs.  His pain is well controlled.  He otherwise is without acute complaint    Objective     Vital Signs  Temp:  [97.5 °F (36.4 °C)-99.7 °F (37.6 °C)] 97.9 °F (36.6 °C)  Heart Rate:  [53-94] 83  Resp:  [12-16] 13  BP: ()/(47-87) 116/71  Arterial Line BP: ()/(48-71) 116/55    Physical Exam   Constitutional: He is oriented to person, place, and time. He appears well-developed and well-nourished.   HENT:   Head: Normocephalic and atraumatic.   Eyes: Pupils are equal, round, and reactive to light. EOM are normal.   Neck: Normal range of motion. Neck supple. No JVD present.   Cardiovascular: Normal rate and regular rhythm.   Pulses:       Carotid pulses are 2+ on the right side, and 2+ on the left side.       Radial pulses are 2+ on the right side, and 2+ on the left side.        Femoral pulses are 2+ on the right side, and 2+ on the left side.       Popliteal pulses are 2+ on the right side,  and 2+ on the left side.        Dorsalis pedis pulses are 0 on the right side, and 0 on the left side.        Posterior tibial pulses are 2+ on the right side, and 2+ on the left side.   He does have bilateral palpable posterior tibial pulses.  He has Doppler signals at the bilateral DPs but they are not palpable.  Both feet appear warm with good perfusion.  Motor and sensation is intact bilaterally.    His right groin incision has a dressing in place.  There is a small amount of serosanguineous strikethrough but is otherwise clean.  On palpation of the area there is no significant hematoma or sign of infection.  He does have some tenderness to palpation of the area.  On the left the arterial access site also has a dressing in place that is clean.  The groin is soft with no evidence of hematoma.   Pulmonary/Chest: Effort normal. No respiratory distress.   Abdominal: Soft. He exhibits no distension and no mass. There is no tenderness.   Musculoskeletal: Normal range of motion. He exhibits no edema, tenderness or deformity.   Neurological: He is alert and oriented to person, place, and time. No sensory deficit. He exhibits normal muscle tone.   Skin: Skin is warm and dry. Capillary refill takes less than 2 seconds.   Psychiatric: He has a normal mood and affect. His behavior is normal. Judgment and thought content normal.   Vitals reviewed.      Laboratory Data:   Results from last 7 days   Lab Units 12/20/19  0456 12/17/19  1333   WBC 10*3/mm3 12.55* 8.22   HEMOGLOBIN g/dL 12.0* 16.2   HEMATOCRIT % 36.3* 49.1   PLATELETS 10*3/mm3 198 255       Results from last 7 days   Lab Units 12/20/19  0456 12/19/19  1615 12/17/19  1333 12/13/19  1130   SODIUM mmol/L 136  --  139  --    SODIUM, ARTERIAL mmol/L  --  138  --   --    POTASSIUM mmol/L 4.5  --  3.8  --    CHLORIDE mmol/L 103  --  102  --    CO2 mmol/L 25.0  --  27.0  --    BUN mg/dL 11  --  7  --    CREATININE mg/dL 0.86  --  0.92 1.10   CALCIUM mg/dL 8.3*  --  9.4   --    GLUCOSE mg/dL 137*  --  97  --      Results from last 7 days   Lab Units 12/20/19  0644 12/20/19  0244 12/19/19  2252 12/17/19  1333   INR   --   --   --  0.91   APTT seconds 56.1* 50.1* 74.9*  --             Medication Review: Reviewed, no changes.  Remains on argatroban gtt    Assessment/Plan       Atherosclerosis of native artery of right lower extremity with intermittent claudication (CMS/HCC)      This is a 44-year-old male who is postoperative day #1 status post aortoiliac angiogram as well as right groin cutdown with thrombectomy of the right SFA as well as right lower extremity angiogram with angioplasty of the right external iliac artery, SFA, popliteal artery, and tibioperoneal trunk as well as stenting of the right distal SFA and popliteal arteries.  -Check HIT panel  -Continue argatroban gtt for now  -We will ultimately need transition to oral anticoagulation at discharge  -DC Shields catheter  -DC A-line  -Okay for out of bed  -Pain control as needed  -Diet as tolerated  -Continue ICU monitoring for now and will reassess later in the day and may be able to transfer to  if continues to remain stable    Plan for disposition: Continued inpatient care    Karson Rivera MD  Vascular Surgery  [phone]  12/20/19  10:57 AM

## 2019-12-20 NOTE — INTERVAL H&P NOTE
H&P reviewed. The patient was examined and there are no changes to the H&P.   For angiogram and RLE thrombectomy. Risks of the procedure were discussed.  These include, but are not limited to, bleeding, infection, vessel damage, nerve damage, embolus, and loss of limb.  The patient understands these risks and wishes to proceed with procedure.

## 2019-12-21 VITALS
RESPIRATION RATE: 18 BRPM | BODY MASS INDEX: 27.94 KG/M2 | TEMPERATURE: 97.5 F | OXYGEN SATURATION: 95 % | HEART RATE: 85 BPM | HEIGHT: 71 IN | DIASTOLIC BLOOD PRESSURE: 69 MMHG | SYSTOLIC BLOOD PRESSURE: 124 MMHG | WEIGHT: 199.6 LBS

## 2019-12-21 LAB
ANION GAP SERPL CALCULATED.3IONS-SCNC: 7 MMOL/L (ref 5–15)
APTT PPP: 70.7 SECONDS (ref 24.1–35)
APTT PPP: 71.5 SECONDS (ref 24.1–35)
APTT PPP: 82.4 SECONDS (ref 24.1–35)
BASOPHILS # BLD AUTO: 0.02 10*3/MM3 (ref 0–0.2)
BASOPHILS NFR BLD AUTO: 0.2 % (ref 0–1.5)
BUN BLD-MCNC: 12 MG/DL (ref 6–20)
BUN/CREAT SERPL: 14.1 (ref 7–25)
CALCIUM SPEC-SCNC: 8.3 MG/DL (ref 8.6–10.5)
CHLORIDE SERPL-SCNC: 104 MMOL/L (ref 98–107)
CO2 SERPL-SCNC: 28 MMOL/L (ref 22–29)
CREAT BLD-MCNC: 0.85 MG/DL (ref 0.76–1.27)
DEPRECATED RDW RBC AUTO: 46.4 FL (ref 37–54)
EOSINOPHIL # BLD AUTO: 0.08 10*3/MM3 (ref 0–0.4)
EOSINOPHIL NFR BLD AUTO: 0.9 % (ref 0.3–6.2)
ERYTHROCYTE [DISTWIDTH] IN BLOOD BY AUTOMATED COUNT: 14.2 % (ref 12.3–15.4)
GFR SERPL CREATININE-BSD FRML MDRD: 98 ML/MIN/1.73
GLUCOSE BLD-MCNC: 97 MG/DL (ref 65–99)
HCT VFR BLD AUTO: 33.7 % (ref 37.5–51)
HGB BLD-MCNC: 11.1 G/DL (ref 13–17.7)
IMM GRANULOCYTES # BLD AUTO: 0.03 10*3/MM3 (ref 0–0.05)
IMM GRANULOCYTES NFR BLD AUTO: 0.3 % (ref 0–0.5)
LYMPHOCYTES # BLD AUTO: 2.33 10*3/MM3 (ref 0.7–3.1)
LYMPHOCYTES NFR BLD AUTO: 27.1 % (ref 19.6–45.3)
MCH RBC QN AUTO: 29.4 PG (ref 26.6–33)
MCHC RBC AUTO-ENTMCNC: 32.9 G/DL (ref 31.5–35.7)
MCV RBC AUTO: 89.2 FL (ref 79–97)
MONOCYTES # BLD AUTO: 0.99 10*3/MM3 (ref 0.1–0.9)
MONOCYTES NFR BLD AUTO: 11.5 % (ref 5–12)
NEUTROPHILS # BLD AUTO: 5.14 10*3/MM3 (ref 1.7–7)
NEUTROPHILS NFR BLD AUTO: 60 % (ref 42.7–76)
NRBC BLD AUTO-RTO: 0 /100 WBC (ref 0–0.2)
PLATELET # BLD AUTO: 173 10*3/MM3 (ref 140–450)
PMV BLD AUTO: 10.5 FL (ref 6–12)
POTASSIUM BLD-SCNC: 3.8 MMOL/L (ref 3.5–5.2)
RBC # BLD AUTO: 3.78 10*6/MM3 (ref 4.14–5.8)
SODIUM BLD-SCNC: 139 MMOL/L (ref 136–145)
WBC NRBC COR # BLD: 8.59 10*3/MM3 (ref 3.4–10.8)

## 2019-12-21 PROCEDURE — 99024 POSTOP FOLLOW-UP VISIT: CPT | Performed by: SURGERY

## 2019-12-21 PROCEDURE — 80048 BASIC METABOLIC PNL TOTAL CA: CPT | Performed by: SURGERY

## 2019-12-21 PROCEDURE — 85730 THROMBOPLASTIN TIME PARTIAL: CPT | Performed by: SURGERY

## 2019-12-21 PROCEDURE — 25010000002 ARGATROBAN PER 5 MG: Performed by: SURGERY

## 2019-12-21 PROCEDURE — 85025 COMPLETE CBC W/AUTO DIFF WBC: CPT | Performed by: SURGERY

## 2019-12-21 RX ORDER — HYDROCODONE BITARTRATE AND ACETAMINOPHEN 7.5; 325 MG/1; MG/1
1 TABLET ORAL EVERY 6 HOURS PRN
Qty: 40 TABLET | Refills: 0 | Status: SHIPPED | OUTPATIENT
Start: 2019-12-21 | End: 2019-12-29

## 2019-12-21 RX ADMIN — RIVAROXABAN 15 MG: 15 TABLET, FILM COATED ORAL at 08:28

## 2019-12-21 RX ADMIN — ASPIRIN 81 MG: 81 TABLET ORAL at 08:28

## 2019-12-21 RX ADMIN — SODIUM CHLORIDE 4 MCG/KG/MIN: 9 INJECTION, SOLUTION INTRAVENOUS at 00:15

## 2019-12-21 NOTE — PLAN OF CARE
Problem: Patient Care Overview  Goal: Plan of Care Review  Outcome: Ongoing (interventions implemented as appropriate)  Flowsheets  Taken 12/21/2019 0216 by Leonardo Dickson, RN  Progress: improving  Outcome Summary: Pt had no c/o pain this shift. A&O x 4. Neuros in check. Dsgs d/i with marked dry drainage. Dorsalis pedis and posterior tibial pulses palpable. Argatroban is infusing at 4 mcg/kg/min. Last PTT 71.5. Safety maintained. VSS. Will continue to monitor.  Taken 12/20/2019 1600 by Renae Dawkins, RN  Plan of Care Reviewed With: patient

## 2019-12-21 NOTE — DISCHARGE SUMMARY
Date of Discharge:  12/21/2019    Discharge Diagnosis: Atherosclerosis of the right lower extremity with intermittent claudication    Presenting Problem/History of Present Illness  Atherosclerosis of native artery of right lower extremity with intermittent claudication (CMS/[facility]) [I70.211]  Atherosclerosis of native artery of right lower extremity with intermittent claudication (CMS/[facility]) [I70.211]       Hospital Course   The patient is a 44 y.o. male who we are currently following for previous aortic thrombus.  He had undergone angiogram with placement of an aortobiiliac stent graft back in August.  He had been seen here 1 month postoperatively and at that time a CTA was done which showed excellent position of the stent graft which was widely patent and no further evidence of any thrombus.  He had palpable bilateral pedal pulses at that time with no claudication.  He was advised to continue on both aspirin and Plavix at that visit moving forward.  However he reports for about the last month he has been experiencing right lower extremity claudication mainly in the calf.  It is at fairly short distances at this point and is making it difficult for him to work.  About a week ago he reports he had some weakness to the right leg and had a fall and suffered some scratches to his face.  On further questioning he reports that about a month ago he did stop his Plavix for a week or 2 for unknown reasons but has now since restarted.  Otherwise he denies any ischemic rest pain.  After being seen in the office today he was sent for a CTA of the abdominal aorta with runoff which I did personally review.  It shows that the previously placed aortoiliac stent graft is widely patent.  In the right lower extremity runoff into the external iliac and common femoral is patent and the profunda is also patent.  However just beyond its takeoff the right superficial femoral artery appears completely occluded and remained so until it  reconstitutes at the level of the popliteal artery above the knee.  The tibial vessels appear diminutive but do show patency.  He was taken to the operating room for extensive revascularization of the right lower extremity.  Throughout his stay here he is done quite well and spent the first night in the ICU on an argatroban drip.  His feet are nice and warm with palpable PT pulses.  His groins are soft without hematoma.  He is ready for discharge home.  He will be started on a Xarelto starter pack and a baby aspirin.  He will also continue on his statin therapy.  He will see Dr. Washington in 2 weeks time for continued follow-up and care.  This was all discussed in full with complete understanding.     Procedures Performed  Procedure(s):  RIGHT LOWER EXTREMITY FEMORAL THROMBECTOMY/EMBOLECTOMY, POSSIBLE ANGIOGRAM, POSSIBLE INTERVENTION       Consults:   Consults     No orders found from 11/20/2019 to 12/20/2019.            Condition on Discharge: Good    Discharge Medications     Discharge Medications      New Medications      Instructions Start Date   HYDROcodone-acetaminophen 7.5-325 MG per tablet  Commonly known as:  NORCO   1 tablet, Oral, Every 6 Hours PRN      rivaroxaban 15 MG tablet  Commonly known as:  XARELTO   15 mg, Oral, 2 Times Daily With Meals         Continue These Medications      Instructions Start Date   aspirin 81 MG EC tablet   81 mg, Oral, Daily      atorvastatin 40 MG tablet  Commonly known as:  LIPITOR   40 mg, Oral, Nightly         Stop These Medications    clopidogrel 75 MG tablet  Commonly known as:  PLAVIX            Discharge Diet:   Diet Instructions     Advance Diet As Tolerated            Activity at Discharge:   Activity Instructions     Bathing Restrictions      Type of Restriction:  Bathing    Bathing Restrictions:  Other    Explain Bathing Restrictions:  ok to shower starting 12/22    Driving Restrictions      Type of Restriction:  Driving    Driving Restrictions:  No Driving (Time  Limited)    Length:  1 Week    Gradually Increase Activity Until at Pre-Hospitalization Level      Lifting Restrictions      Type of Restriction:  Lifting    Lifting Restrictions:  Lifting Restriction (Indicate Limit)    Weight Limit (Pounds):  10    Length of Lifting Restriction:  1 week    Work Restrictions      Type of Restriction:  Work    May Return to Work:  In 1 Week    With / Without Restrictions:  Without Restrictions          Follow-up Appointments  Future Appointments   Date Time Provider Department Center   9/4/2020  1:00 PM Karson Rivera MD [facility] None     Additional Instructions for the Follow-ups that You Need to Schedule     Discharge Follow-up with Specified Provider: Miguel; 2 Weeks   As directed      To:  Miguel    Follow Up:  2 Weeks                I spent greater than 30 minutes of time on face-to-face interaction, chart review, and arranging discharge.    Alexandru James DO  12/21/19  11:29 AM

## 2019-12-21 NOTE — PAYOR COMM NOTE
"DC HOME 12-21-19  [id number]    Fiona Laguerre (44 y.o. Male)     Date of Birth Social Security Number Address Home Phone MRN    1975  [address] [place] [id number] [phone] [id number]    Denominational Marital Status          Other Single       Admission Date Admission Type Admitting Provider Attending Provider Department, Room/Bed    12/19/19 Elective Karson Rivera MD  [facility] 3C, 377/1    Discharge Date Discharge Disposition Discharge Destination        12/21/2019 Home or Self Care              Attending Provider:  (none)   Allergies:  No Known Allergies    Isolation:  None   Infection:  None   Code Status:  CPR    Ht:  179.1 cm (70.5\")   Wt:  90.5 kg (199 lb 9.6 oz)    Admission Cmt:  None   Principal Problem:  Atherosclerosis of native artery of right lower extremity with intermittent claudication (CMS/HCC) [I70.211] More...                 Active Insurance as of 12/19/2019     Primary Coverage     Payor Plan Insurance Group Employer/Plan Group    [employer] MEDICAID [facility] MEDICAID [id number]     Payor Plan Address Payor Plan Phone Number Payor Plan Fax Number Effective Dates    [address] [address] [phone]  8/15/2019 - None Entered    [facility] [id number]       Subscriber Name Subscriber Birth Date Member ID       FIONA LAGUERRE 1975 [id number]                 Emergency Contacts      (Rel.) Home Phone Work Phone Mobile Phone    Tiff Douglas (Mother) -- -- [phone]               Discharge Summary      Alexandru James DO at 12/21/19 1129            Date of Discharge:  12/21/2019    Discharge Diagnosis: Atherosclerosis of the right lower extremity with intermittent claudication    Presenting Problem/History of Present Illness  Atherosclerosis of native artery of right lower extremity with intermittent claudication (CMS/HCC) [I70.211]  Atherosclerosis of native artery of right lower extremity with intermittent claudication (CMS/HCC) " [I61.211]       Hospital Course   The patient is a 44 y.o. male who we are currently following for previous aortic thrombus.  He had undergone angiogram with placement of an aortobiiliac stent graft back in August.  He had been seen here 1 month postoperatively and at that time a CTA was done which showed excellent position of the stent graft which was widely patent and no further evidence of any thrombus.  He had palpable bilateral pedal pulses at that time with no claudication.  He was advised to continue on both aspirin and Plavix at that visit moving forward.  However he reports for about the last month he has been experiencing right lower extremity claudication mainly in the calf.  It is at fairly short distances at this point and is making it difficult for him to work.  About a week ago he reports he had some weakness to the right leg and had a fall and suffered some scratches to his face.  On further questioning he reports that about a month ago he did stop his Plavix for a week or 2 for unknown reasons but has now since restarted.  Otherwise he denies any ischemic rest pain.  After being seen in the office today he was sent for a CTA of the abdominal aorta with runoff which I did personally review.  It shows that the previously placed aortoiliac stent graft is widely patent.  In the right lower extremity runoff into the external iliac and common femoral is patent and the profunda is also patent.  However just beyond its takeoff the right superficial femoral artery appears completely occluded and remained so until it reconstitutes at the level of the popliteal artery above the knee.  The tibial vessels appear diminutive but do show patency.  He was taken to the operating room for extensive revascularization of the right lower extremity.  Throughout his stay here he is done quite well and spent the first night in the ICU on an argatroban drip.  His feet are nice and warm with palpable PT pulses.  His groins  are soft without hematoma.  He is ready for discharge home.  He will be started on a Xarelto starter pack and a baby aspirin.  He will also continue on his statin therapy.  He will see Dr. Washington in 2 weeks time for continued follow-up and care.  This was all discussed in full with complete understanding.     Procedures Performed  Procedure(s):  RIGHT LOWER EXTREMITY FEMORAL THROMBECTOMY/EMBOLECTOMY, POSSIBLE ANGIOGRAM, POSSIBLE INTERVENTION       Consults:   Consults     No orders found from 11/20/2019 to 12/20/2019.            Condition on Discharge: Good    Discharge Medications     Discharge Medications      New Medications      Instructions Start Date   HYDROcodone-acetaminophen 7.5-325 MG per tablet  Commonly known as:  NORCO   1 tablet, Oral, Every 6 Hours PRN      rivaroxaban 15 MG tablet  Commonly known as:  XARELTO   15 mg, Oral, 2 Times Daily With Meals         Continue These Medications      Instructions Start Date   aspirin 81 MG EC tablet   81 mg, Oral, Daily      atorvastatin 40 MG tablet  Commonly known as:  LIPITOR   40 mg, Oral, Nightly         Stop These Medications    clopidogrel 75 MG tablet  Commonly known as:  PLAVIX            Discharge Diet:   Diet Instructions     Advance Diet As Tolerated            Activity at Discharge:   Activity Instructions     Bathing Restrictions      Type of Restriction:  Bathing    Bathing Restrictions:  Other    Explain Bathing Restrictions:  ok to shower starting 12/22    Driving Restrictions      Type of Restriction:  Driving    Driving Restrictions:  No Driving (Time Limited)    Length:  1 Week    Gradually Increase Activity Until at Pre-Hospitalization Level      Lifting Restrictions      Type of Restriction:  Lifting    Lifting Restrictions:  Lifting Restriction (Indicate Limit)    Weight Limit (Pounds):  10    Length of Lifting Restriction:  1 week    Work Restrictions      Type of Restriction:  Work    May Return to Work:  In 1 Week    With / Without  Restrictions:  Without Restrictions          Follow-up Appointments  Future Appointments   Date Time Provider Department Center   9/4/2020  1:00 PM Karson Rivera MD [facility] None     Additional Instructions for the Follow-ups that You Need to Schedule     Discharge Follow-up with Specified Provider: Miguel; 2 Weeks   As directed      To:  Miguel    Follow Up:  2 Weeks                I spent greater than 30 minutes of time on face-to-face interaction, chart review, and arranging discharge.    Alexandru James DO  12/21/19  11:29 AM    Electronically signed by Alexandru James DO at 12/21/19 1134

## 2019-12-22 LAB — PF4 HEPARIN CMPLX AB SER-ACNC: 0.32 OD (ref 0–0.4)

## 2020-01-08 ENCOUNTER — TELEPHONE (OUTPATIENT)
Dept: PODIATRY | Facility: CLINIC | Age: 45
End: 2020-01-08

## 2020-01-08 NOTE — TELEPHONE ENCOUNTER
Called to the remind the patient of his appt on Friday with Dr. Rivera.  The patient was aware and confirmed.

## 2020-01-10 ENCOUNTER — OFFICE VISIT (OUTPATIENT)
Dept: VASCULAR SURGERY | Facility: CLINIC | Age: 45
End: 2020-01-10

## 2020-01-10 VITALS
HEIGHT: 71 IN | HEART RATE: 86 BPM | OXYGEN SATURATION: 98 % | DIASTOLIC BLOOD PRESSURE: 88 MMHG | SYSTOLIC BLOOD PRESSURE: 138 MMHG | WEIGHT: 199 LBS | BODY MASS INDEX: 27.86 KG/M2

## 2020-01-10 DIAGNOSIS — I70.211 ATHEROSCLEROSIS OF NATIVE ARTERY OF RIGHT LOWER EXTREMITY WITH INTERMITTENT CLAUDICATION (HCC): Primary | ICD-10-CM

## 2020-01-10 PROCEDURE — 99024 POSTOP FOLLOW-UP VISIT: CPT | Performed by: SURGERY

## 2020-01-10 RX ORDER — CEFUROXIME AXETIL 500 MG/1
500 TABLET ORAL 2 TIMES DAILY
COMMUNITY
Start: 2020-01-02 | End: 2020-05-20

## 2020-01-10 NOTE — PATIENT INSTRUCTIONS

## 2020-01-10 NOTE — PROGRESS NOTES
01/10/2020      Manas Zhu MD  [address] [place] [id number]        Fiona Laguerre  1975    Chief Complaint   Patient presents with   • Post-op     2 wk po right femoral thrombectomy.  Pt states that he is healing well after this procedure.       Dear Manas Zhu MD:    HPI     I had the pleasure of seeing your patient in the office today for follow up.  As you recall, the patient is a 44 y.o. male who we are currently following for previous finding of aortic thrombus causing blue toe syndrome.  He ultimately underwent aortogram with placement of an aorto iliac stent graft for exclusion of this thrombus that was found at the aortic bifurcation.  Tolerated that procedure well however about 2 weeks ago on presentation to the office complained of new significant claudication to the right lower extremity.  Ultimately imaging showed thrombosis of the native right SFA but continued patency of his aortoiliac graft.  Ultimately he was taken to the operating room for right groin exploration with thrombectomy of the right SFA with angiogram and stenting of the right SFA.  He was postoperatively placed on anticoagulation with Xarelto given the fact that he had thrombosed his native artery.  Since the procedure he has done well and denies any further claudication to the right lower extremity.  He had some mild discomfort at the right groin incision site postoperatively but this is now resolved.  He otherwise overall feels well with no new acute complaints.      Review of Systems   Constitutional: Negative.  Negative for activity change, appetite change, chills, diaphoresis, fatigue and fever.   HENT: Negative.  Negative for congestion, sneezing, sore throat and trouble swallowing.    Eyes: Negative.  Negative for visual disturbance.   Respiratory: Negative.  Negative for chest tightness and shortness of breath.    Cardiovascular: Negative.  Negative for chest pain, palpitations and leg swelling.  "  Gastrointestinal: Negative.  Negative for abdominal distention, abdominal pain, nausea and vomiting.   Endocrine: Negative.    Genitourinary: Negative.    Musculoskeletal: Negative.         Previous right lower extremity claudication has now resolved   Skin: Negative.    Allergic/Immunologic: Negative.    Neurological: Negative.    Hematological: Negative.    Psychiatric/Behavioral: Negative.        /88   Pulse 86   Ht 179.1 cm (70.5\")   Wt 90.3 kg (199 lb)   SpO2 98%   BMI 28.15 kg/m²   Physical Exam   Constitutional: He is oriented to person, place, and time. He appears well-developed and well-nourished.   HENT:   Head: Normocephalic and atraumatic.   Eyes: Pupils are equal, round, and reactive to light. EOM are normal.   Neck: Normal range of motion. Neck supple. No JVD present.   Cardiovascular: Normal rate and regular rhythm.   Pulses:       Carotid pulses are 2+ on the right side, and 2+ on the left side.       Radial pulses are 2+ on the right side, and 2+ on the left side.        Femoral pulses are 2+ on the right side, and 2+ on the left side.       Popliteal pulses are 0 on the right side, and 2+ on the left side.        Dorsalis pedis pulses are 1+ on the right side, and 1+ on the left side.        Posterior tibial pulses are 2+ on the right side, and 2+ on the left side.   Bilateral groin incisions are well-healed.   Pulmonary/Chest: Effort normal. No respiratory distress.   Abdominal: Soft. He exhibits no distension.   Musculoskeletal: Normal range of motion. He exhibits no edema, tenderness or deformity.   Neurological: He is alert and oriented to person, place, and time.   Skin: Skin is warm and dry. Capillary refill takes less than 2 seconds.   Psychiatric: He has a normal mood and affect. His behavior is normal. Judgment and thought content normal.   Vitals reviewed.      DIAGNOSTIC DATA:    Ir Angiogram Extremity    Result Date: 12/20/2019  Narrative: Performed by Dr. Rivera. Please " see procedure note. This report was finalized on 12/20/2019 16:53 by Dr. Karson Rivera MD.    Ct Angio Abdominal Aorta Bilateral Iliofem Runoff    Addendum Date: 1/2/2020 Addendum:   Addendum: 3-D MIP images were obtained. This report was finalized on 01/02/2020 16:03 by Dr. Leo Mercedes MD.    Result Date: 1/2/2020  Narrative: Exam: CT ANGIO ABDOMINAL AORTA BILAT ILIOFEM RUNOFF- -- 12/13/2019 11:20 AM CST  Indication: Right lower extremity ischemia, status post aortoiliac endograft  Comparison: 09/05/2019  DOSE LENGTH PRODUCT: 1554 mGy cm. Automated exposure control was also utilized to decrease patient radiation dose.  Findings:  The abdominal aorta is normal in caliber. The celiac trunk, SMA, and renal arteries are widely patent. The BROOKE is not well opacified and may be excluded. Postprocedural change of aortobiiliac stent grafting. The stented portion of the aorta is normal in caliber and the stent graft is widely patent. No endoleak or graft migration. The common iliac arteries and bilateral internal/external iliac arteries are widely patent.  Right lower extremity: The right common femoral artery is widely patent. The right profunda is widely patent. The right superficial femoral artery is completely occluded just beyond its origin with reconstitution distally just above the popliteal artery. Popliteal artery is slightly diminutive but widely patent. Normal right trifurcation. Three-vessel runoff appears maintained although dorsalis pedis appear slightly diminutive compared to the left.  Left lower extremity: The left common femoral artery is widely patent. The left profunda and superficial femoral arteries are widely patent. Left popliteal artery is widely patent. Normal left-sided trifurcation. Three-vessel runoff on the left.  Soft tissue findings:  Lung bases are clear. Arterial phase liver, spleen, right adrenal gland, and pancreas appear unremarkable. No change in small left adrenal gland nodule  measuring 1.2 cm, likely an adenoma. Gallbladder and biliary tree appear normal. No solid renal lesion. No urolithiasis or hydronephrosis. No focal urinary bladder abnormality. Prostate and seminal vesicles appear unremarkable.  No abnormal bowel distention or adjacent inflammation. Normal appendix. No ascites or free pelvic fluid. No pelvic mass organized pelvic collection. Prior bilateral inguinal cutdowns are noted. No enlarged retroperitoneal, mesenteric, pelvic, or inguinal lymph nodes. No suspicious focal bone lesion.      Impression: 1. Complete occlusion of the right superficial femoral artery just beyond its origin with reconstitution just above the popliteal artery. Three-vessel runoff is maintained on the right although dorsalis pedis appears slightly diminutive. 2. Three-vessel runoff on the left. 3. Aortobiiliac stent graft remains widely patent. No endoleak or graft migration. 4. Stable small 1.2 cm left adrenal gland nodule. This likely represents an adenoma.  --- These findings were discussed with ordering provider's nurse Arielle on 12/13/2019 1:00 PM CST by Dr. Leo Mercedes. This report was finalized on 12/13/2019 13:01 by Dr. Leo Mercedes MD.    Fl C Arm During Surgery    Result Date: 12/20/2019  Narrative: Performed by Dr. Rivera. Please see procedure note. This report was finalized on 12/20/2019 16:53 by Dr. Karson Rivera MD.      Patient Active Problem List   Diagnosis   • Ischemic toe   • Thrombus of aorta (CMS/HCC)   • Tobacco abuse   • Mixed hyperlipidemia   • Blue toe syndrome (CMS/HCC)   • Atherosclerosis of native artery of right lower extremity with intermittent claudication (CMS/HCC)         ICD-10-CM ICD-9-CM   1. Atherosclerosis of native artery of right lower extremity with intermittent claudication (CMS/HCC) I70.211 440.21       Lab Frequency Next Occurrence   CT Angiogram Abdomen Pelvis With & Without Contrast Once 09/25/2020   Follow Anesthesia Guidelines / Standing Orders Once  12/13/2019   Obtain Informed Consent Once 12/18/2019   Provide NPO Instructions to Patient Once 12/18/2019   Chlorhexidine Skin Prep Once 12/18/2019   US Ankle / Brachial Indices Extremity Complete Once 04/08/2020       PLAN: After thoroughly evaluating Fiona Laguerre, I believe the best course of action is to remain conservative from a vascular standpoint.  He has done well after recent right groin exploration with thrombectomy of the right SFA and angiogram and stenting of the SFA.  He does have bilateral palpable posterior tibial and dorsalis pedis pulses today and no further claudication to the right lower extremity.  He otherwise feels well and is without acute complaint.  At this point I will plan to see him back here in the office in 3 months with repeat ALBERTO/PVR for continued surveillance.  The patient is to continue taking their medications as previously discussed, including Xarelto.  I did provide him with additional samples today and sent a prescription to his pharmacy once those samples have been used.   I did discuss vascular risk factors as they pertain to the progression of vascular disease including controlling hyperlipidemia. These factors remain stable. Patient's Body mass index is 28.15 kg/m². BMI is above normal parameters. Recommendations include: educational material. This was all discussed in full with complete understanding.  Thank you for allowing me to participate in the care of your patient.  Please do not hesitate to call with any questions or concerns.  We will keep you aware of any further encounters with Fiona Laguerre.      Sincerely Yours,      Karson Rivera MD

## 2020-04-10 ENCOUNTER — APPOINTMENT (OUTPATIENT)
Dept: ULTRASOUND IMAGING | Facility: HOSPITAL | Age: 45
End: 2020-04-10

## 2020-05-19 ENCOUNTER — TELEPHONE (OUTPATIENT)
Dept: VASCULAR SURGERY | Facility: CLINIC | Age: 45
End: 2020-05-19

## 2020-05-19 NOTE — TELEPHONE ENCOUNTER
Called and spoke with the patient's mother since we haven't been able to get in touch with the patient.  She stated that he was aware of his appt for testing and to see Dr. Rivera, but was out of minutes in his phone.  I went over the info regarding the appts with her anyway.

## 2020-05-20 ENCOUNTER — HOSPITAL ENCOUNTER (OUTPATIENT)
Dept: ULTRASOUND IMAGING | Facility: HOSPITAL | Age: 45
Discharge: HOME | End: 2020-05-20
Admitting: SURGERY

## 2020-05-20 ENCOUNTER — OFFICE VISIT (OUTPATIENT)
Dept: VASCULAR SURGERY | Facility: CLINIC | Age: 45
End: 2020-05-20

## 2020-05-20 VITALS
OXYGEN SATURATION: 99 % | BODY MASS INDEX: 28.15 KG/M2 | SYSTOLIC BLOOD PRESSURE: 136 MMHG | HEART RATE: 83 BPM | HEIGHT: 71 IN | DIASTOLIC BLOOD PRESSURE: 84 MMHG

## 2020-05-20 DIAGNOSIS — I74.10 THROMBUS OF AORTA (HCC): ICD-10-CM

## 2020-05-20 DIAGNOSIS — I70.211 ATHEROSCLEROSIS OF NATIVE ARTERY OF RIGHT LOWER EXTREMITY WITH INTERMITTENT CLAUDICATION (HCC): ICD-10-CM

## 2020-05-20 DIAGNOSIS — I70.211 ATHEROSCLEROSIS OF NATIVE ARTERY OF RIGHT LOWER EXTREMITY WITH INTERMITTENT CLAUDICATION (HCC): Primary | ICD-10-CM

## 2020-05-20 DIAGNOSIS — Z72.0 TOBACCO ABUSE: ICD-10-CM

## 2020-05-20 PROCEDURE — 93923 UPR/LXTR ART STDY 3+ LVLS: CPT | Performed by: SURGERY

## 2020-05-20 PROCEDURE — 93923 UPR/LXTR ART STDY 3+ LVLS: CPT

## 2020-05-20 PROCEDURE — 99214 OFFICE O/P EST MOD 30 MIN: CPT | Performed by: SURGERY

## 2020-05-20 NOTE — PROGRESS NOTES
"05/20/2020      Manas Zhu MD  [address] [address]        Fiona Laguerre  1975    Chief Complaint   Patient presents with   • Follow-up     3 month f/u with ABIs.       Dear Manas Zhu MD:    HPI     I had the pleasure of seeing your patient in the office today for follow up.  As you recall, the patient is a 44 y.o. male who we are currently following after previous placement of aortoiliac stent graft for aortic bifurcation thrombus, and subsequent right lower extremity thrombectomy with angioplasty and stenting.  He had repeat arterial testing done today with ALBERTO/PVR which I did review in the office personally.  It shows normal ALBERTO values bilaterally with no evidence of any significant arterial insufficiency.  Clinically he reports feeling well today with again no complaints of claudication in either lower extremity.  He has been walking and working normally with no issues and no limitations.  He has remained on his Xarelto 20 mg daily without issue.  He otherwise has no further acute complaints today.    Review of Systems   Constitutional: Negative.  Negative for activity change, appetite change, chills, diaphoresis, fatigue and fever.   HENT: Negative.  Negative for congestion, sneezing, sore throat and trouble swallowing.    Eyes: Negative.  Negative for visual disturbance.   Respiratory: Negative.  Negative for chest tightness and shortness of breath.    Cardiovascular: Negative.  Negative for chest pain, palpitations and leg swelling.   Gastrointestinal: Negative.  Negative for abdominal distention, abdominal pain, nausea and vomiting.   Endocrine: Negative.    Genitourinary: Negative.    Musculoskeletal: Negative.         Previous right lower extremity claudication has now resolved   Skin: Negative.    Allergic/Immunologic: Negative.    Neurological: Negative.    Hematological: Negative.    Psychiatric/Behavioral: Negative.        /84   Pulse 83   Ht 179.1 cm (70.5\")  "  SpO2 99%   BMI 28.15 kg/m²   Physical Exam   Constitutional: He is oriented to person, place, and time. He appears well-developed and well-nourished.   HENT:   Head: Normocephalic and atraumatic.   Eyes: Pupils are equal, round, and reactive to light. EOM are normal.   Neck: Normal range of motion. Neck supple. No JVD present.   Cardiovascular: Normal rate and regular rhythm.   Pulses:       Carotid pulses are 2+ on the right side, and 2+ on the left side.       Radial pulses are 2+ on the right side, and 2+ on the left side.        Femoral pulses are 2+ on the right side, and 2+ on the left side.       Popliteal pulses are 0 on the right side, and 2+ on the left side.        Dorsalis pedis pulses are 1+ on the right side, and 1+ on the left side.        Posterior tibial pulses are 2+ on the right side, and 2+ on the left side.   Bilateral groin incisions are well-healed.   Pulmonary/Chest: Effort normal. No respiratory distress.   Abdominal: Soft. He exhibits no distension.   Musculoskeletal: Normal range of motion. He exhibits no edema, tenderness or deformity.   Neurological: He is alert and oriented to person, place, and time.   Skin: Skin is warm and dry. Capillary refill takes less than 2 seconds.   Psychiatric: He has a normal mood and affect. His behavior is normal. Judgment and thought content normal.   Vitals reviewed.      DIAGNOSTIC DATA:          Patient Active Problem List   Diagnosis   • Ischemic toe   • Thrombus of aorta (CMS/HCC)   • Tobacco abuse   • Mixed hyperlipidemia   • Blue toe syndrome (CMS/HCC)   • Atherosclerosis of native artery of right lower extremity with intermittent claudication (CMS/HCC)         ICD-10-CM ICD-9-CM   1. Atherosclerosis of native artery of right lower extremity with intermittent claudication (CMS/HCC) I70.211 440.21   2. Thrombus of aorta (CMS/HCC) I74.10 444.1   3. Tobacco abuse Z72.0 305.1       Lab Frequency Next Occurrence   CT Angiogram Abdomen Pelvis With &  Without Contrast Once 09/25/2020   Follow Anesthesia Guidelines / Standing Orders Once 12/13/2019   Obtain Informed Consent Once 12/18/2019   Provide NPO Instructions to Patient Once 12/18/2019   Chlorhexidine Skin Prep Once 12/18/2019   US Ankle / Brachial Indices Extremity Complete Once 11/16/2020   CT Angiogram Abdomen Pelvis Once 11/16/2020       PLAN: After thoroughly evaluating Fiona Laguerre, I believe the best course of action is to remain conservative from a vascular standpoint.  Overall he is continued to do well after his right lower extremity thrombectomy, and subsequent angioplasty and stenting.  He remains claudication free and is able to ambulate without issue.  He has palpable pulses at the bilateral DP and PT and both feet are warm with no signs of any ischemia.  At this point he should continue his Xarelto daily and will be on this indefinitely moving forward.  I will plan to see him back here in the office in 6 months with a repeat ALBERTO/PVR, as well as a repeat CTA of the abdomen and pelvis for continued surveillance of his aortoiliac stent graft.  The patient is to continue taking their medications as previously discussed.   I did discuss vascular risk factors as they pertain to the progression of vascular disease including controlling hyperlipidemia. These factors remain stable. Patient's Body mass index is 28.15 kg/m². BMI is above normal parameters. Recommendations include: educational material. This was all discussed in full with complete understanding.  Thank you for allowing me to participate in the care of your patient.  Please do not hesitate to call with any questions or concerns.  We will keep you aware of any further encounters with Fiona Laguerre.      Sincerely Yours,      Karson Rivera MD

## 2020-05-20 NOTE — PATIENT INSTRUCTIONS

## 2020-05-21 ENCOUNTER — TELEPHONE (OUTPATIENT)
Dept: VASCULAR SURGERY | Facility: CLINIC | Age: 45
End: 2020-05-21

## 2020-05-21 NOTE — TELEPHONE ENCOUNTER
Left message advisng patient that per Dr. Miguel ray will not have the origanally schedueld appointment in September but will have testing and appointemnt in Nov., 6 months after his visit yesterday. Left office phone number for patient to return my call with any questions or concerns.

## 2020-06-22 ENCOUNTER — TELEPHONE (OUTPATIENT)
Dept: VASCULAR SURGERY | Facility: CLINIC | Age: 45
End: 2020-06-22

## 2020-06-22 NOTE — TELEPHONE ENCOUNTER
Pt's mother called and said that the patient has gone to [place] to take care of his sister and will likely be there for a while.  She said that he wanted to be referred to a vascular surgeon in [place] because he is having some issues.    I told the patient's mother that if he could find the name of a vascular surgeon in that area and let us know, we would be able to get a referral for him.  She stated that they would look to find one and get back with us.

## 2020-06-26 ENCOUNTER — TELEPHONE (OUTPATIENT)
Dept: VASCULAR SURGERY | Facility: CLINIC | Age: 45
End: 2020-06-26

## 2020-06-26 NOTE — TELEPHONE ENCOUNTER
Pt's mom called and stated that the patient needed to be referred to a vascular surgeon in [place].  He will be there for a while taking care of his sister and is having some issues.  I told her that we would need a specific name of a doctor he wanted to see.      She called back and gave me the name of Dr. Jae Pierre.  I gave the info to Dr. Rivera and he okayed it.  This was faxed today and scanned into the chart.    Dr. Jae Pierre  [address] [facility] , [address]  Fax: [phone]

## 2020-08-25 DIAGNOSIS — I70.211 ATHEROSCLEROSIS OF NATIVE ARTERY OF RIGHT LOWER EXTREMITY WITH INTERMITTENT CLAUDICATION (HCC): Primary | ICD-10-CM

## 2020-08-25 DIAGNOSIS — I74.10 THROMBUS OF AORTA (HCC): ICD-10-CM

## 2020-08-25 RX ORDER — CLOPIDOGREL BISULFATE 75 MG/1
75 TABLET ORAL DAILY
Qty: 30 TABLET | Refills: 0 | Status: SHIPPED | OUTPATIENT
Start: 2020-08-25 | End: 2020-09-24

## 2020-09-04 ENCOUNTER — APPOINTMENT (OUTPATIENT)
Dept: CT IMAGING | Facility: HOSPITAL | Age: 45
End: 2020-09-04

## 2020-11-11 ENCOUNTER — TELEPHONE (OUTPATIENT)
Dept: VASCULAR SURGERY | Facility: CLINIC | Age: 45
End: 2020-11-11

## 2020-11-11 NOTE — TELEPHONE ENCOUNTER
Contacted the patient to remind him of his testing and appt to see Dr. Rivera on Friday.  Pt states that he has had something to come up and plans to come, however, may not have a car.  He said that if he is unable to get a car, he will call back and let us know to reschedule.

## 2020-11-13 ENCOUNTER — HOSPITAL ENCOUNTER (OUTPATIENT)
Dept: CT IMAGING | Facility: HOSPITAL | Age: 45
End: 2020-11-13

## 2020-12-07 ENCOUNTER — TELEPHONE (OUTPATIENT)
Dept: PODIATRY | Facility: CLINIC | Age: 45
End: 2020-12-07

## 2020-12-07 RX ORDER — CLOPIDOGREL BISULFATE 75 MG/1
75 TABLET ORAL DAILY
Qty: 30 TABLET | Refills: 5 | Status: SHIPPED | OUTPATIENT
Start: 2020-12-07 | End: 2021-01-06

## 2020-12-07 NOTE — TELEPHONE ENCOUNTER
----- Message from Karson Rivera MD sent at 12/7/2020  9:49 AM CST -----  Ok. Plavix prescription sent.    ThanksKarson  ----- Message -----  From: Mary Grace Matamoros MA  Sent: 12/7/2020   8:36 AM CST  To: Karson Rivera MD    I called and spoke to him.  He said that the doctor he saw while he was in [place] took him off the low dose asprin and started the Plavix in the morning and Xarelto at night.  ----- Message -----  From: Karson Rivera MD  Sent: 12/7/2020   8:23 AM CST  To: Mary Grace Matamoros MA    Yes, he should be on Xarelto. I don't recall him being on Plavix recently. Can you confirm this with the patient or his mother so we can get him the correct prescription?  ThanksKarson  ----- Message -----  From: Mary Grace Matamoros MA  Sent: 12/7/2020   7:45 AM CST  To: Karson Rivera MD    Pt's mom called and said that the patient needed a refill of his Plavix.  I saw where he was taking Xarelto.  She left a couple of messages about him needing his Plavix that he is completely out.  Please advise.

## 2020-12-21 ENCOUNTER — HOSPITAL ENCOUNTER (OUTPATIENT)
Dept: CT IMAGING | Facility: HOSPITAL | Age: 45
Discharge: HOME | End: 2020-12-21

## 2020-12-21 ENCOUNTER — OFFICE VISIT (OUTPATIENT)
Dept: VASCULAR SURGERY | Facility: CLINIC | Age: 45
End: 2020-12-21

## 2020-12-21 ENCOUNTER — HOSPITAL ENCOUNTER (OUTPATIENT)
Dept: ULTRASOUND IMAGING | Facility: HOSPITAL | Age: 45
Discharge: HOME | End: 2020-12-21

## 2020-12-21 VITALS
SYSTOLIC BLOOD PRESSURE: 118 MMHG | HEIGHT: 70 IN | OXYGEN SATURATION: 98 % | HEART RATE: 94 BPM | BODY MASS INDEX: 28.55 KG/M2 | DIASTOLIC BLOOD PRESSURE: 88 MMHG

## 2020-12-21 DIAGNOSIS — I74.10 THROMBUS OF AORTA (HCC): ICD-10-CM

## 2020-12-21 DIAGNOSIS — Z98.890 PERIPHERAL ARTERIAL DISEASE WITH HISTORY OF REVASCULARIZATION (HCC): Primary | ICD-10-CM

## 2020-12-21 DIAGNOSIS — E78.2 MIXED HYPERLIPIDEMIA: ICD-10-CM

## 2020-12-21 DIAGNOSIS — I70.211 ATHEROSCLEROSIS OF NATIVE ARTERY OF RIGHT LOWER EXTREMITY WITH INTERMITTENT CLAUDICATION (HCC): ICD-10-CM

## 2020-12-21 DIAGNOSIS — I73.9 PERIPHERAL ARTERIAL DISEASE WITH HISTORY OF REVASCULARIZATION (HCC): Primary | ICD-10-CM

## 2020-12-21 LAB — CREAT BLDA-MCNC: 1.3 MG/DL (ref 0.6–1.3)

## 2020-12-21 PROCEDURE — 82565 ASSAY OF CREATININE: CPT

## 2020-12-21 PROCEDURE — 74174 CTA ABD&PLVS W/CONTRAST: CPT

## 2020-12-21 PROCEDURE — 93923 UPR/LXTR ART STDY 3+ LVLS: CPT | Performed by: SURGERY

## 2020-12-21 PROCEDURE — 99214 OFFICE O/P EST MOD 30 MIN: CPT | Performed by: SURGERY

## 2020-12-21 PROCEDURE — 93923 UPR/LXTR ART STDY 3+ LVLS: CPT

## 2020-12-21 PROCEDURE — 25010000002 IOPAMIDOL 61 % SOLUTION: Performed by: SURGERY

## 2020-12-21 RX ORDER — HYDROCODONE BITARTRATE AND ACETAMINOPHEN 5; 325 MG/1; MG/1
1 TABLET ORAL 2 TIMES DAILY
COMMUNITY
Start: 2020-11-28 | End: 2021-12-06

## 2020-12-21 RX ORDER — GABAPENTIN 100 MG/1
CAPSULE ORAL
COMMUNITY
Start: 2020-12-16 | End: 2021-12-06

## 2020-12-21 RX ADMIN — IOPAMIDOL 100 ML: 612 INJECTION, SOLUTION INTRAVENOUS at 10:46

## 2020-12-21 NOTE — PROGRESS NOTES
12/21/2020      Manas Zhu MD  [address] [facility] [place] [address]        Fiona Laguerre  1975    Chief Complaint   Patient presents with   • Follow-up     6 month f/u with ABIs and CTA of the abdomen/pelvis.  Pt states that adding a second blood thinner has helped his leg.  Pt denies any new or worsening abdominal/back pain.       Dear Manas Zhu MD:    HPI     I had the pleasure of seeing your patient in the office today for follow up.  As you recall, the patient is a 45 y.o. male who we are currently following for history of previous aortic thrombus and blue toe syndrome.  He ultimately had undergone placement of a stent graft in the distal aorta and bilateral common iliac arteries for exclusion of that thrombus back in August 2019.  He had been maintained on anticoagulation since that time however he subsequently represented about a year ago with new onset right lower extremity claudication was found to have thrombotic occlusion of the right SFA.  He was returned to the OR at that time for right lower extremity thrombectomy with angiogram and ultimately angioplasty and stenting of the distal SFA and above-knee popliteal artery on the right.  He had done well following this and was last seen in our office in May of this year.  At that time ABLERTO/PVR was done which showed no evidence of any arterial insufficiency and he had palpable pulses throughout the bilateral lower extremities with no claudication or any other symptoms.  He subsequently had to move to [place] shortly after that to help take care of his sister.  He returns today for continued surveillance and had a repeat CTA of the abdomen/pelvis for evaluation of his stent graft as well as ALBERTO/PVR which I did personally review.  CTA of the abdomen/pelvis shows continued patency of the previously placed aortoiliac stent graft with no evidence of any significant stenosis or occlusion.  The proximal portion of the right SFA just after its  takeoff likely does have a small area of focal dissection which is likely chronic in nature but there is no flow-limiting stenosis.  His ALBERTO/PVRs are normal with no evidence of any arterial insufficiency in the bilateral lower extremities.  On further discussion in the office today the patient does report that while in [place] around August he began experiencing again right lower extremity claudication and was seen by a vascular surgeon in that area.  He notes he did ultimately undergo angiogram via left groin access approach and from what he describes required placement of a catheter for thrombolysis of thrombus in the distal SFA/popliteal artery stents as well as in one of his tibial vessels.  He reports that the catheter was left in for 24 hours for what sounds like infusion of thrombolytic and then he was taken back to the operating room the following day for further imaging and denies the placement of any new stents.  He reports no open procedure was done.  However I do not have any op reports from this and have asked the patient to send any records that he may have.  Otherwise today he reports feeling well and is having no issues since that time.  He denies any claudication or rest pain.  He is able to ambulate easily with no issues.  Since that procedure done in [place] he has been continued on his Xarelto and the aspirin that he was previously on was switched to Plavix which he takes 75 mg daily.  He also continues on a statin.  He has no other acute complaints today.      Review of Systems   Constitutional: Negative.  Negative for activity change, appetite change, chills, diaphoresis, fatigue and fever.   HENT: Negative.  Negative for congestion, sneezing, sore throat and trouble swallowing.    Eyes: Negative.  Negative for visual disturbance.   Respiratory: Negative.  Negative for chest tightness and shortness of breath.    Cardiovascular: Negative.  Negative for chest pain, palpitations and leg  "swelling.   Gastrointestinal: Negative.  Negative for abdominal distention, abdominal pain, nausea and vomiting.   Endocrine: Negative.    Genitourinary: Negative.    Musculoskeletal: Negative.    Skin: Negative.    Allergic/Immunologic: Negative.    Neurological: Negative.    Hematological: Negative.    Psychiatric/Behavioral: Negative.        /88   Pulse 94   Ht 177.8 cm (70\")   SpO2 98%   BMI 28.55 kg/m²   Physical Exam  Vitals signs reviewed.   Constitutional:       Appearance: Normal appearance.   HENT:      Head: Normocephalic and atraumatic.      Nose: Nose normal.      Mouth/Throat:      Mouth: Mucous membranes are moist.   Eyes:      Extraocular Movements: Extraocular movements intact.      Pupils: Pupils are equal, round, and reactive to light.   Neck:      Musculoskeletal: Normal range of motion and neck supple.   Cardiovascular:      Rate and Rhythm: Normal rate and regular rhythm.      Pulses: Normal pulses.           Carotid pulses are 2+ on the right side and 2+ on the left side.       Radial pulses are 2+ on the right side and 2+ on the left side.        Femoral pulses are 2+ on the right side and 2+ on the left side.       Popliteal pulses are 2+ on the right side and 2+ on the left side.        Dorsalis pedis pulses are 2+ on the right side and 2+ on the left side.        Posterior tibial pulses are 2+ on the right side and 2+ on the left side.      Comments: Previous bilateral groin incisions are well-healed.  Pulmonary:      Effort: Pulmonary effort is normal. No respiratory distress.   Abdominal:      General: Abdomen is flat. There is no distension.      Palpations: Abdomen is soft. There is no mass.      Tenderness: There is no abdominal tenderness.   Musculoskeletal: Normal range of motion.      Right lower leg: No edema.      Left lower leg: No edema.   Skin:     General: Skin is warm and dry.      Capillary Refill: Capillary refill takes less than 2 seconds.   Neurological:      " General: No focal deficit present.      Mental Status: He is alert and oriented to person, place, and time.   Psychiatric:         Mood and Affect: Mood normal.         Behavior: Behavior normal.         Thought Content: Thought content normal.         Judgment: Judgment normal.         DIAGNOSTIC DATA:    Us Ankle / Brachial Indices Extremity Complete    Result Date: 12/21/2020  Narrative:  History: PAD  Comments: Bilateral lower extremity arterial with multi-level pulse volume recordings and segmental pressures were performed at rest and stress.  The right ankle/brachial index is 1.03. The waveforms are triphasic without dampening.These findings are consistent with no significant arterial insufficiency of the right lower extremity at rest.  The postexercise ABIs 1.13.  The left ankle/brachial index is 1.05. The waveforms are triphasic without dampening. These findings are consistent with no significant arterial insufficiency of the left lower extremity at rest.    The postexercise ABIs 1.12.      Impression: Impression: 1. No significant arterial insufficiency of the right lower extremity at rest. 2. No significant arterial insufficiency of the left lower extremity at rest.   This report was finalized on 12/21/2020 11:36 by Dr. Alexandru James MD.      Patient Active Problem List   Diagnosis   • Ischemic toe   • Thrombus of aorta (CMS/HCC)   • Tobacco abuse   • Mixed hyperlipidemia   • Blue toe syndrome (CMS/HCC)   • Atherosclerosis of native artery of right lower extremity with intermittent claudication (CMS/HCC)         ICD-10-CM ICD-9-CM   1. Peripheral arterial disease with history of revascularization (CMS/HCC)  I73.9 443.9    Z98.890 V45.89   2. Mixed hyperlipidemia  E78.2 272.2       Lab Frequency Next Occurrence   Follow Anesthesia Guidelines / Standing Orders Once 12/13/2019   Obtain Informed Consent Once 12/18/2019   Provide NPO Instructions to Patient Once 12/18/2019   Chlorhexidine Skin Prep Once  12/18/2019   US Ankle / Brachial Indices Extremity Complete Once 06/19/2021       PLAN: After thoroughly evaluating Fiona Laguerre, I believe the best course of action is to remain conservative from a vascular standpoint.  He does seem to be doing quite well and has palpable pulses throughout the bilateral lower extremities and denies any claudication.  However as noted above he did undergo angiogram while living in [place] back around August for what sounds like arterial thrombus in the right lower extremity.  I have asked the patient to forward any operative reports or other information from that procedure so that we can have them on record here in his chart.  Today's imaging showed widely patent aortoiliac stent graft that was previously placed and his ALBERTO/PVR shows no evidence of any arterial insufficiency.  As such I will plan to see him back in the office in 6 months with a repeat ALBERTO/PVR for continued surveillance.  He should continue on his Xarelto as well as the Plavix moving forward and should also stay on his statin.  The patient is to otherwise continue taking their medications as previously discussed.   I did discuss vascular risk factors as they pertain to the progression of vascular disease including controlling hyperlipidemia. These factors remain stable. Patient's Body mass index is 28.55 kg/m². BMI is above normal parameters. Recommendations include: educational material. This was all discussed in full with complete understanding.  Thank you for allowing me to participate in the care of your patient.  Please do not hesitate to call with any questions or concerns.  We will keep you aware of any further encounters with Fiona Laguerre.      Sincerely Yours,      Karson Rivera MD

## 2021-03-31 ENCOUNTER — TELEPHONE (OUTPATIENT)
Dept: VASCULAR SURGERY | Facility: CLINIC | Age: 46
End: 2021-03-31

## 2021-03-31 DIAGNOSIS — I74.10 THROMBUS OF AORTA (HCC): ICD-10-CM

## 2021-03-31 NOTE — TELEPHONE ENCOUNTER
Spoke with Tiff, pt mother, regarding refill. Dr. Rivera informed me that he will send in the xarelto refill, but pt would need to refer back to pain clinic for gabapentin. I gave this information to pt mother. She expressed understanding.

## 2021-03-31 NOTE — TELEPHONE ENCOUNTER
Returning voicemail from pt mother for med refill. Needs xarelto and gabapentin refilled. Pt goes to pain clinic, was unsure if  would fill gabapentin.

## 2021-05-21 RX ORDER — CLOPIDOGREL BISULFATE 75 MG/1
75 TABLET ORAL DAILY
Qty: 30 TABLET | Refills: 5 | Status: SHIPPED | OUTPATIENT
Start: 2021-05-21 | End: 2021-11-17

## 2021-06-21 ENCOUNTER — OFFICE VISIT (OUTPATIENT)
Dept: VASCULAR SURGERY | Facility: CLINIC | Age: 46
End: 2021-06-21

## 2021-06-21 ENCOUNTER — HOSPITAL ENCOUNTER (OUTPATIENT)
Dept: ULTRASOUND IMAGING | Facility: HOSPITAL | Age: 46
Discharge: HOME | End: 2021-06-21
Admitting: SURGERY

## 2021-06-21 VITALS
HEART RATE: 74 BPM | SYSTOLIC BLOOD PRESSURE: 140 MMHG | DIASTOLIC BLOOD PRESSURE: 86 MMHG | HEIGHT: 70 IN | WEIGHT: 170 LBS | OXYGEN SATURATION: 97 % | BODY MASS INDEX: 24.34 KG/M2

## 2021-06-21 DIAGNOSIS — Z98.890 PERIPHERAL ARTERIAL DISEASE WITH HISTORY OF REVASCULARIZATION (HCC): Primary | ICD-10-CM

## 2021-06-21 DIAGNOSIS — I73.9 PERIPHERAL ARTERIAL DISEASE WITH HISTORY OF REVASCULARIZATION (HCC): ICD-10-CM

## 2021-06-21 DIAGNOSIS — I73.9 PERIPHERAL ARTERIAL DISEASE WITH HISTORY OF REVASCULARIZATION (HCC): Primary | ICD-10-CM

## 2021-06-21 DIAGNOSIS — E78.2 MIXED HYPERLIPIDEMIA: ICD-10-CM

## 2021-06-21 DIAGNOSIS — Z98.890 PERIPHERAL ARTERIAL DISEASE WITH HISTORY OF REVASCULARIZATION (HCC): ICD-10-CM

## 2021-06-21 PROCEDURE — 99214 OFFICE O/P EST MOD 30 MIN: CPT | Performed by: SURGERY

## 2021-06-21 PROCEDURE — 93923 UPR/LXTR ART STDY 3+ LVLS: CPT | Performed by: SURGERY

## 2021-06-21 PROCEDURE — 93923 UPR/LXTR ART STDY 3+ LVLS: CPT

## 2021-06-21 NOTE — PROGRESS NOTES
06/21/2021      Manas Zhu MD  [address] [place] [address]        Fiona Laguerre  1975    Chief Complaint   Patient presents with   • Follow-up     6 month f/u with ABIs.  Pt states he is doing about the same.  Last seen in the office 12/21/20.       Dear Manas Zhu MD:    HPI     I had the pleasure of seeing your patient in the office today for follow up.  As you recall, the patient is a 46 y.o. male who we are currently following for history of previous aortic thrombus and blue toe syndrome.  He ultimately had undergone placement of a stent graft in the distal aorta and bilateral common iliac arteries for exclusion of that thrombus back in August 2019.  He had been maintained on anticoagulation since that time however he subsequently represented following that with new onset right lower extremity claudication was found to have thrombotic occlusion of the right SFA.  He was returned to the OR at that time for right lower extremity thrombectomy with angiogram and ultimately angioplasty and stenting of the distal SFA and above-knee popliteal artery on the right. He subsequently had to move to [place] shortly after that to help take care of his sister. While in [place] around August he began experiencing again right lower extremity claudication and was seen by a vascular surgeon in that area.  He notes he did ultimately undergo angiogram via left groin access approach and from what he describes required placement of a catheter for thrombolysis of thrombus in the distal SFA/popliteal artery stents as well as in one of his tibial vessels.  He reports that the catheter was left in for 24 hours for what sounds like infusion of thrombolytic and then he was taken back to the operating room the following day for further imaging and denies the placement of any new stents.  He reports no open procedure was done.  However I do not have any op reports from this and have asked the patient to send any  "records that he may have.  Otherwise today he reports feeling well and is having no issues since that time.  He denies any claudication or rest pain.  He is able to ambulate easily with no issues.  Since that procedure done in [place] he has been continued on his Xarelto and Plavix.  He had repeat ALBERTO/PVR done today which I did personally review.  It shows normal values bilaterally with no evidence of any arterial insufficiency in the bilateral lower extremities.    Review of Systems   Constitutional: Negative.  Negative for activity change, appetite change, chills, diaphoresis, fatigue and fever.   HENT: Negative.  Negative for congestion, sneezing, sore throat and trouble swallowing.    Eyes: Negative.  Negative for visual disturbance.   Respiratory: Negative.  Negative for chest tightness and shortness of breath.    Cardiovascular: Negative.  Negative for chest pain, palpitations and leg swelling.   Gastrointestinal: Negative.  Negative for abdominal distention, abdominal pain, nausea and vomiting.   Endocrine: Negative.    Genitourinary: Negative.    Musculoskeletal: Negative.    Skin: Negative.    Allergic/Immunologic: Negative.    Neurological: Negative.    Hematological: Negative.    Psychiatric/Behavioral: Negative.        /86   Pulse 74   Ht 177.8 cm (70\")   Wt 77.1 kg (170 lb)   SpO2 97%   BMI 24.39 kg/m²   Physical Exam  Vitals reviewed.   Constitutional:       Appearance: Normal appearance.   HENT:      Head: Normocephalic and atraumatic.      Nose: Nose normal.      Mouth/Throat:      Mouth: Mucous membranes are moist.   Eyes:      Extraocular Movements: Extraocular movements intact.      Pupils: Pupils are equal, round, and reactive to light.   Cardiovascular:      Rate and Rhythm: Normal rate and regular rhythm.      Pulses: Normal pulses.           Carotid pulses are 2+ on the right side and 2+ on the left side.       Radial pulses are 2+ on the right side and 2+ on the left side.        " Femoral pulses are 2+ on the right side and 2+ on the left side.       Popliteal pulses are 2+ on the right side and 2+ on the left side.        Dorsalis pedis pulses are 2+ on the right side and 2+ on the left side.        Posterior tibial pulses are 2+ on the right side and 2+ on the left side.      Comments: Previous bilateral groin incisions are well-healed.  Pulmonary:      Effort: Pulmonary effort is normal. No respiratory distress.   Abdominal:      General: Abdomen is flat. There is no distension.      Palpations: Abdomen is soft. There is no mass.      Tenderness: There is no abdominal tenderness.   Musculoskeletal:         General: Normal range of motion.      Cervical back: Normal range of motion and neck supple.      Right lower leg: No edema.      Left lower leg: No edema.   Skin:     General: Skin is warm and dry.      Capillary Refill: Capillary refill takes less than 2 seconds.   Neurological:      General: No focal deficit present.      Mental Status: He is alert and oriented to person, place, and time.   Psychiatric:         Mood and Affect: Mood normal.         Behavior: Behavior normal.         Thought Content: Thought content normal.         Judgment: Judgment normal.         DIAGNOSTIC DATA:        Patient Active Problem List   Diagnosis   • Ischemic toe   • Thrombus of aorta (CMS/HCC)   • Tobacco abuse   • Mixed hyperlipidemia   • Blue toe syndrome (CMS/HCC)   • Atherosclerosis of native artery of right lower extremity with intermittent claudication (CMS/HCC)         ICD-10-CM ICD-9-CM   1. Peripheral arterial disease with history of revascularization (CMS/HCC)  I73.9 443.9    Z98.890 V45.89       Lab Frequency Next Occurrence   Follow Anesthesia Guidelines / Standing Orders Once 12/13/2019   Obtain Informed Consent Once 12/18/2019   Provide NPO Instructions to Patient Once 12/18/2019   Chlorhexidine Skin Prep Once 12/18/2019   US Ankle / Brachial Indices Extremity Complete Once 06/19/2021        PLAN: After thoroughly evaluating Fiona Laguerre, I believe the best course of action is to remain conservative from a vascular standpoint.  He does seem to be doing quite well and has palpable pulses throughout the bilateral lower extremities and denies any claudication. Today's imaging which I personally reviewed in the office showed widely patent aortoiliac stent graft that was previously placed and his ALBERTO/PVR shows no evidence of any arterial insufficiency.  As such I will plan to see him back in the office in 6 months with a repeat ALBERTO/PVR for continued surveillance.  He should continue on his Xarelto as well as the Plavix moving forward and should also stay on his statin.  The patient is to otherwise continue taking their medications as previously discussed.   I did discuss vascular risk factors as they pertain to the progression of vascular disease including controlling hyperlipidemia. These factors remain stable. Patient's Body mass index is 24.39 kg/m². BMI is above normal parameters. Recommendations include: educational material. This was all discussed in full with complete understanding. Thank you for allowing me to participate in the care of your patient.  Please do not hesitate to call with any questions or concerns.  We will keep you aware of any further encounters with Fiona Laguerre.      Sincerely Yours,      Karson Rivera MD

## 2021-11-15 ENCOUNTER — APPOINTMENT (OUTPATIENT)
Dept: CT IMAGING | Facility: HOSPITAL | Age: 46
End: 2021-11-15

## 2021-11-15 ENCOUNTER — TELEPHONE (OUTPATIENT)
Dept: VASCULAR SURGERY | Facility: CLINIC | Age: 46
End: 2021-11-15

## 2021-11-15 ENCOUNTER — HOSPITAL ENCOUNTER (INPATIENT)
Facility: HOSPITAL | Age: 46
LOS: 2 days | Discharge: HOME | End: 2021-11-17
Attending: STUDENT IN AN ORGANIZED HEALTH CARE EDUCATION/TRAINING PROGRAM | Admitting: SURGERY

## 2021-11-15 DIAGNOSIS — I70.90 ARTERIAL OCCLUSION: Primary | ICD-10-CM

## 2021-11-15 LAB
ABO GROUP BLD: NORMAL
ALBUMIN SERPL-MCNC: 4.3 G/DL (ref 3.5–5.2)
ALBUMIN/GLOB SERPL: 1.4 G/DL
ALP SERPL-CCNC: 59 U/L (ref 39–117)
ALT SERPL W P-5'-P-CCNC: 23 U/L (ref 1–41)
ANION GAP SERPL CALCULATED.3IONS-SCNC: 12 MMOL/L (ref 5–15)
APTT PPP: 28.1 SECONDS (ref 24.1–35)
AST SERPL-CCNC: 32 U/L (ref 1–40)
BASOPHILS # BLD AUTO: 0.05 10*3/MM3 (ref 0–0.2)
BASOPHILS NFR BLD AUTO: 0.6 % (ref 0–1.5)
BILIRUB SERPL-MCNC: 0.3 MG/DL (ref 0–1.2)
BLD GP AB SCN SERPL QL: NEGATIVE
BUN SERPL-MCNC: 6 MG/DL (ref 6–20)
BUN/CREAT SERPL: 7.5 (ref 7–25)
CALCIUM SPEC-SCNC: 9.4 MG/DL (ref 8.6–10.5)
CHLORIDE SERPL-SCNC: 95 MMOL/L (ref 98–107)
CO2 SERPL-SCNC: 25 MMOL/L (ref 22–29)
CREAT SERPL-MCNC: 0.8 MG/DL (ref 0.76–1.27)
DEPRECATED RDW RBC AUTO: 46.2 FL (ref 37–54)
EOSINOPHIL # BLD AUTO: 0.02 10*3/MM3 (ref 0–0.4)
EOSINOPHIL NFR BLD AUTO: 0.2 % (ref 0.3–6.2)
ERYTHROCYTE [DISTWIDTH] IN BLOOD BY AUTOMATED COUNT: 13.2 % (ref 12.3–15.4)
GFR SERPL CREATININE-BSD FRML MDRD: 104 ML/MIN/1.73
GLOBULIN UR ELPH-MCNC: 3.1 GM/DL
GLUCOSE SERPL-MCNC: 152 MG/DL (ref 65–99)
HCT VFR BLD AUTO: 44.5 % (ref 37.5–51)
HGB BLD-MCNC: 15.5 G/DL (ref 13–17.7)
IMM GRANULOCYTES # BLD AUTO: 0.03 10*3/MM3 (ref 0–0.05)
IMM GRANULOCYTES NFR BLD AUTO: 0.3 % (ref 0–0.5)
INR PPP: 0.99 (ref 0.91–1.09)
LYMPHOCYTES # BLD AUTO: 1.31 10*3/MM3 (ref 0.7–3.1)
LYMPHOCYTES NFR BLD AUTO: 15.1 % (ref 19.6–45.3)
MCH RBC QN AUTO: 32.8 PG (ref 26.6–33)
MCHC RBC AUTO-ENTMCNC: 34.8 G/DL (ref 31.5–35.7)
MCV RBC AUTO: 94.1 FL (ref 79–97)
MONOCYTES # BLD AUTO: 0.57 10*3/MM3 (ref 0.1–0.9)
MONOCYTES NFR BLD AUTO: 6.6 % (ref 5–12)
NEUTROPHILS NFR BLD AUTO: 6.71 10*3/MM3 (ref 1.7–7)
NEUTROPHILS NFR BLD AUTO: 77.2 % (ref 42.7–76)
NRBC BLD AUTO-RTO: 0 /100 WBC (ref 0–0.2)
PLATELET # BLD AUTO: 218 10*3/MM3 (ref 140–450)
PMV BLD AUTO: 9.6 FL (ref 6–12)
POTASSIUM SERPL-SCNC: 4.2 MMOL/L (ref 3.5–5.2)
PROT SERPL-MCNC: 7.4 G/DL (ref 6–8.5)
PROTHROMBIN TIME: 12.7 SECONDS (ref 11.9–14.6)
RBC # BLD AUTO: 4.73 10*6/MM3 (ref 4.14–5.8)
RH BLD: POSITIVE
SARS-COV-2 RNA PNL SPEC NAA+PROBE: NOT DETECTED
SODIUM SERPL-SCNC: 132 MMOL/L (ref 136–145)
T&S EXPIRATION DATE: NORMAL
WBC # BLD AUTO: 8.69 10*3/MM3 (ref 3.4–10.8)

## 2021-11-15 PROCEDURE — 75635 CT ANGIO ABDOMINAL ARTERIES: CPT

## 2021-11-15 PROCEDURE — 87635 SARS-COV-2 COVID-19 AMP PRB: CPT | Performed by: STUDENT IN AN ORGANIZED HEALTH CARE EDUCATION/TRAINING PROGRAM

## 2021-11-15 PROCEDURE — 0 IOPAMIDOL PER 1 ML: Performed by: STUDENT IN AN ORGANIZED HEALTH CARE EDUCATION/TRAINING PROGRAM

## 2021-11-15 PROCEDURE — 80053 COMPREHEN METABOLIC PANEL: CPT | Performed by: STUDENT IN AN ORGANIZED HEALTH CARE EDUCATION/TRAINING PROGRAM

## 2021-11-15 PROCEDURE — 93005 ELECTROCARDIOGRAM TRACING: CPT | Performed by: SURGERY

## 2021-11-15 PROCEDURE — 99223 1ST HOSP IP/OBS HIGH 75: CPT | Performed by: SURGERY

## 2021-11-15 PROCEDURE — 25010000002 HEPARIN (PORCINE) PER 1000 UNITS: Performed by: STUDENT IN AN ORGANIZED HEALTH CARE EDUCATION/TRAINING PROGRAM

## 2021-11-15 PROCEDURE — 86900 BLOOD TYPING SEROLOGIC ABO: CPT | Performed by: STUDENT IN AN ORGANIZED HEALTH CARE EDUCATION/TRAINING PROGRAM

## 2021-11-15 PROCEDURE — 93005 ELECTROCARDIOGRAM TRACING: CPT | Performed by: STUDENT IN AN ORGANIZED HEALTH CARE EDUCATION/TRAINING PROGRAM

## 2021-11-15 PROCEDURE — 85730 THROMBOPLASTIN TIME PARTIAL: CPT | Performed by: STUDENT IN AN ORGANIZED HEALTH CARE EDUCATION/TRAINING PROGRAM

## 2021-11-15 PROCEDURE — 99284 EMERGENCY DEPT VISIT MOD MDM: CPT

## 2021-11-15 PROCEDURE — 85610 PROTHROMBIN TIME: CPT | Performed by: STUDENT IN AN ORGANIZED HEALTH CARE EDUCATION/TRAINING PROGRAM

## 2021-11-15 PROCEDURE — B41D1ZZ FLUOROSCOPY OF AORTA AND BILATERAL LOWER EXTREMITY ARTERIES USING LOW OSMOLAR CONTRAST: ICD-10-PCS | Performed by: SURGERY

## 2021-11-15 PROCEDURE — 93010 ELECTROCARDIOGRAM REPORT: CPT | Performed by: INTERNAL MEDICINE

## 2021-11-15 PROCEDURE — 86901 BLOOD TYPING SEROLOGIC RH(D): CPT | Performed by: STUDENT IN AN ORGANIZED HEALTH CARE EDUCATION/TRAINING PROGRAM

## 2021-11-15 PROCEDURE — 85025 COMPLETE CBC W/AUTO DIFF WBC: CPT | Performed by: STUDENT IN AN ORGANIZED HEALTH CARE EDUCATION/TRAINING PROGRAM

## 2021-11-15 PROCEDURE — 36415 COLL VENOUS BLD VENIPUNCTURE: CPT | Performed by: STUDENT IN AN ORGANIZED HEALTH CARE EDUCATION/TRAINING PROGRAM

## 2021-11-15 PROCEDURE — 86850 RBC ANTIBODY SCREEN: CPT | Performed by: STUDENT IN AN ORGANIZED HEALTH CARE EDUCATION/TRAINING PROGRAM

## 2021-11-15 RX ORDER — HEPARIN SODIUM 1000 [USP'U]/ML
80 INJECTION, SOLUTION INTRAVENOUS; SUBCUTANEOUS AS NEEDED
Status: DISCONTINUED | OUTPATIENT
Start: 2021-11-15 | End: 2021-11-17

## 2021-11-15 RX ORDER — NALOXONE HCL 0.4 MG/ML
0.4 VIAL (ML) INJECTION
Status: DISCONTINUED | OUTPATIENT
Start: 2021-11-15 | End: 2021-11-17 | Stop reason: HOSPADM

## 2021-11-15 RX ORDER — CLOPIDOGREL BISULFATE 75 MG/1
75 TABLET ORAL DAILY
Status: DISCONTINUED | OUTPATIENT
Start: 2021-11-16 | End: 2021-11-17 | Stop reason: HOSPADM

## 2021-11-15 RX ORDER — SODIUM CHLORIDE 0.9 % (FLUSH) 0.9 %
10 SYRINGE (ML) INJECTION EVERY 12 HOURS SCHEDULED
Status: DISCONTINUED | OUTPATIENT
Start: 2021-11-16 | End: 2021-11-17 | Stop reason: HOSPADM

## 2021-11-15 RX ORDER — ATORVASTATIN CALCIUM 40 MG/1
40 TABLET, FILM COATED ORAL NIGHTLY
Status: DISCONTINUED | OUTPATIENT
Start: 2021-11-16 | End: 2021-11-17 | Stop reason: HOSPADM

## 2021-11-15 RX ORDER — HEPARIN SODIUM 1000 [USP'U]/ML
80 INJECTION, SOLUTION INTRAVENOUS; SUBCUTANEOUS ONCE
Status: DISCONTINUED | OUTPATIENT
Start: 2021-11-15 | End: 2021-11-15 | Stop reason: SDUPTHER

## 2021-11-15 RX ORDER — HEPARIN SODIUM 10000 [USP'U]/100ML
18 INJECTION, SOLUTION INTRAVENOUS
Status: DISCONTINUED | OUTPATIENT
Start: 2021-11-15 | End: 2021-11-17

## 2021-11-15 RX ORDER — ONDANSETRON 2 MG/ML
4 INJECTION INTRAMUSCULAR; INTRAVENOUS EVERY 6 HOURS PRN
Status: DISCONTINUED | OUTPATIENT
Start: 2021-11-15 | End: 2021-11-17 | Stop reason: HOSPADM

## 2021-11-15 RX ORDER — SODIUM CHLORIDE 0.9 % (FLUSH) 0.9 %
10 SYRINGE (ML) INJECTION AS NEEDED
Status: DISCONTINUED | OUTPATIENT
Start: 2021-11-15 | End: 2021-11-17 | Stop reason: HOSPADM

## 2021-11-15 RX ORDER — HEPARIN SODIUM 1000 [USP'U]/ML
40 INJECTION, SOLUTION INTRAVENOUS; SUBCUTANEOUS AS NEEDED
Status: DISCONTINUED | OUTPATIENT
Start: 2021-11-15 | End: 2021-11-17

## 2021-11-15 RX ORDER — HYDROMORPHONE HYDROCHLORIDE 1 MG/ML
0.5 INJECTION, SOLUTION INTRAMUSCULAR; INTRAVENOUS; SUBCUTANEOUS
Status: DISCONTINUED | OUTPATIENT
Start: 2021-11-15 | End: 2021-11-17 | Stop reason: HOSPADM

## 2021-11-15 RX ORDER — OXYCODONE AND ACETAMINOPHEN 7.5; 325 MG/1; MG/1
1 TABLET ORAL EVERY 4 HOURS PRN
Status: DISCONTINUED | OUTPATIENT
Start: 2021-11-15 | End: 2021-11-17 | Stop reason: HOSPADM

## 2021-11-15 RX ORDER — ONDANSETRON 4 MG/1
4 TABLET, FILM COATED ORAL EVERY 6 HOURS PRN
Status: DISCONTINUED | OUTPATIENT
Start: 2021-11-15 | End: 2021-11-17 | Stop reason: HOSPADM

## 2021-11-15 RX ADMIN — IOPAMIDOL 125 ML: 755 INJECTION, SOLUTION INTRAVENOUS at 20:56

## 2021-11-15 RX ADMIN — HEPARIN SODIUM 18 UNITS/KG/HR: 10000 INJECTION, SOLUTION INTRAVENOUS at 20:32

## 2021-11-15 RX ADMIN — HEPARIN SODIUM 6310 UNITS: 1000 INJECTION, SOLUTION INTRAVENOUS; SUBCUTANEOUS at 20:27

## 2021-11-15 RX ADMIN — SODIUM CHLORIDE, PRESERVATIVE FREE 10 ML: 5 INJECTION INTRAVENOUS at 23:44

## 2021-11-15 RX ADMIN — ATORVASTATIN CALCIUM 40 MG: 40 TABLET, FILM COATED ORAL at 23:44

## 2021-11-15 RX ADMIN — OXYCODONE AND ACETAMINOPHEN 1 TABLET: 7.5; 325 TABLET ORAL at 23:47

## 2021-11-15 NOTE — TELEPHONE ENCOUNTER
Pt's mother called and stated that the patient told her that his foot is completely numb.  I asked if it was cold to the touch.  His mother asked him and he stated that it was.  I gave this information to Dr. Rivera.  He told me to let her know that he should come to the ER for evaluation.  She said that she would bring him here.

## 2021-11-16 ENCOUNTER — ANESTHESIA EVENT (OUTPATIENT)
Dept: PERIOP | Facility: HOSPITAL | Age: 46
End: 2021-11-16

## 2021-11-16 ENCOUNTER — APPOINTMENT (OUTPATIENT)
Dept: INTERVENTIONAL RADIOLOGY/VASCULAR | Facility: HOSPITAL | Age: 46
End: 2021-11-16

## 2021-11-16 ENCOUNTER — ANESTHESIA (OUTPATIENT)
Dept: PERIOP | Facility: HOSPITAL | Age: 46
End: 2021-11-16

## 2021-11-16 LAB
ANION GAP SERPL CALCULATED.3IONS-SCNC: 11 MMOL/L (ref 5–15)
ANION GAP SERPL CALCULATED.3IONS-SCNC: 9 MMOL/L (ref 5–15)
APTT PPP: 104 SECONDS (ref 24.1–35)
APTT PPP: 188.4 SECONDS (ref 24.1–35)
APTT PPP: 52.6 SECONDS (ref 24.1–35)
APTT PPP: 55.3 SECONDS (ref 24.1–35)
APTT PPP: >200 SECONDS (ref 24.1–35)
BASOPHILS # BLD AUTO: 0.05 10*3/MM3 (ref 0–0.2)
BASOPHILS # BLD AUTO: 0.06 10*3/MM3 (ref 0–0.2)
BASOPHILS NFR BLD AUTO: 0.6 % (ref 0–1.5)
BASOPHILS NFR BLD AUTO: 0.7 % (ref 0–1.5)
BUN SERPL-MCNC: 7 MG/DL (ref 6–20)
BUN SERPL-MCNC: 7 MG/DL (ref 6–20)
BUN/CREAT SERPL: 7.4 (ref 7–25)
BUN/CREAT SERPL: 8.2 (ref 7–25)
CALCIUM SPEC-SCNC: 9 MG/DL (ref 8.6–10.5)
CALCIUM SPEC-SCNC: 9.4 MG/DL (ref 8.6–10.5)
CHLORIDE SERPL-SCNC: 100 MMOL/L (ref 98–107)
CHLORIDE SERPL-SCNC: 102 MMOL/L (ref 98–107)
CO2 SERPL-SCNC: 26 MMOL/L (ref 22–29)
CO2 SERPL-SCNC: 26 MMOL/L (ref 22–29)
CREAT SERPL-MCNC: 0.85 MG/DL (ref 0.76–1.27)
CREAT SERPL-MCNC: 0.94 MG/DL (ref 0.76–1.27)
DEPRECATED RDW RBC AUTO: 45.3 FL (ref 37–54)
DEPRECATED RDW RBC AUTO: 46.8 FL (ref 37–54)
EOSINOPHIL # BLD AUTO: 0.13 10*3/MM3 (ref 0–0.4)
EOSINOPHIL # BLD AUTO: 0.16 10*3/MM3 (ref 0–0.4)
EOSINOPHIL NFR BLD AUTO: 1.4 % (ref 0.3–6.2)
EOSINOPHIL NFR BLD AUTO: 1.9 % (ref 0.3–6.2)
ERYTHROCYTE [DISTWIDTH] IN BLOOD BY AUTOMATED COUNT: 13.2 % (ref 12.3–15.4)
ERYTHROCYTE [DISTWIDTH] IN BLOOD BY AUTOMATED COUNT: 13.3 % (ref 12.3–15.4)
GFR SERPL CREATININE-BSD FRML MDRD: 86 ML/MIN/1.73
GFR SERPL CREATININE-BSD FRML MDRD: 97 ML/MIN/1.73
GLUCOSE SERPL-MCNC: 102 MG/DL (ref 65–99)
GLUCOSE SERPL-MCNC: 103 MG/DL (ref 65–99)
HCT VFR BLD AUTO: 42.7 % (ref 37.5–51)
HCT VFR BLD AUTO: 45.6 % (ref 37.5–51)
HGB BLD-MCNC: 14.4 G/DL (ref 13–17.7)
HGB BLD-MCNC: 15.5 G/DL (ref 13–17.7)
IMM GRANULOCYTES # BLD AUTO: 0.03 10*3/MM3 (ref 0–0.05)
IMM GRANULOCYTES # BLD AUTO: 0.04 10*3/MM3 (ref 0–0.05)
IMM GRANULOCYTES NFR BLD AUTO: 0.4 % (ref 0–0.5)
IMM GRANULOCYTES NFR BLD AUTO: 0.4 % (ref 0–0.5)
LYMPHOCYTES # BLD AUTO: 2.42 10*3/MM3 (ref 0.7–3.1)
LYMPHOCYTES # BLD AUTO: 2.86 10*3/MM3 (ref 0.7–3.1)
LYMPHOCYTES NFR BLD AUTO: 26.4 % (ref 19.6–45.3)
LYMPHOCYTES NFR BLD AUTO: 33.4 % (ref 19.6–45.3)
MCH RBC QN AUTO: 31.9 PG (ref 26.6–33)
MCH RBC QN AUTO: 32.2 PG (ref 26.6–33)
MCHC RBC AUTO-ENTMCNC: 33.7 G/DL (ref 31.5–35.7)
MCHC RBC AUTO-ENTMCNC: 34 G/DL (ref 31.5–35.7)
MCV RBC AUTO: 93.8 FL (ref 79–97)
MCV RBC AUTO: 95.5 FL (ref 79–97)
MONOCYTES # BLD AUTO: 0.73 10*3/MM3 (ref 0.1–0.9)
MONOCYTES # BLD AUTO: 0.95 10*3/MM3 (ref 0.1–0.9)
MONOCYTES NFR BLD AUTO: 10.3 % (ref 5–12)
MONOCYTES NFR BLD AUTO: 8.5 % (ref 5–12)
NEUTROPHILS NFR BLD AUTO: 4.73 10*3/MM3 (ref 1.7–7)
NEUTROPHILS NFR BLD AUTO: 5.58 10*3/MM3 (ref 1.7–7)
NEUTROPHILS NFR BLD AUTO: 55.2 % (ref 42.7–76)
NEUTROPHILS NFR BLD AUTO: 60.8 % (ref 42.7–76)
NRBC BLD AUTO-RTO: 0 /100 WBC (ref 0–0.2)
NRBC BLD AUTO-RTO: 0 /100 WBC (ref 0–0.2)
PLATELET # BLD AUTO: 193 10*3/MM3 (ref 140–450)
PLATELET # BLD AUTO: 232 10*3/MM3 (ref 140–450)
PMV BLD AUTO: 10.1 FL (ref 6–12)
PMV BLD AUTO: 9.9 FL (ref 6–12)
POTASSIUM SERPL-SCNC: 4.1 MMOL/L (ref 3.5–5.2)
POTASSIUM SERPL-SCNC: 4.2 MMOL/L (ref 3.5–5.2)
QT INTERVAL: 390 MS
QT INTERVAL: 400 MS
QTC INTERVAL: 408 MS
QTC INTERVAL: 428 MS
RBC # BLD AUTO: 4.47 10*6/MM3 (ref 4.14–5.8)
RBC # BLD AUTO: 4.86 10*6/MM3 (ref 4.14–5.8)
SODIUM SERPL-SCNC: 137 MMOL/L (ref 136–145)
SODIUM SERPL-SCNC: 137 MMOL/L (ref 136–145)
WBC # BLD AUTO: 8.56 10*3/MM3 (ref 3.4–10.8)
WBC # BLD AUTO: 9.18 10*3/MM3 (ref 3.4–10.8)

## 2021-11-16 PROCEDURE — C1894 INTRO/SHEATH, NON-LASER: HCPCS | Performed by: SURGERY

## 2021-11-16 PROCEDURE — 25010000002 HEPARIN (PORCINE) PER 1000 UNITS: Performed by: NURSE ANESTHETIST, CERTIFIED REGISTERED

## 2021-11-16 PROCEDURE — 04CK3ZZ EXTIRPATION OF MATTER FROM RIGHT FEMORAL ARTERY, PERCUTANEOUS APPROACH: ICD-10-PCS | Performed by: SURGERY

## 2021-11-16 PROCEDURE — 047K3DZ DILATION OF RIGHT FEMORAL ARTERY WITH INTRALUMINAL DEVICE, PERCUTANEOUS APPROACH: ICD-10-PCS | Performed by: SURGERY

## 2021-11-16 PROCEDURE — C1876 STENT, NON-COA/NON-COV W/DEL: HCPCS | Performed by: SURGERY

## 2021-11-16 PROCEDURE — 25010000002 HEPARIN (PORCINE) PER 1000 UNITS: Performed by: STUDENT IN AN ORGANIZED HEALTH CARE EDUCATION/TRAINING PROGRAM

## 2021-11-16 PROCEDURE — C1760 CLOSURE DEV, VASC: HCPCS | Performed by: SURGERY

## 2021-11-16 PROCEDURE — 25010000002 HEPARIN (PORCINE) PER 1000 UNITS: Performed by: SURGERY

## 2021-11-16 PROCEDURE — 85025 COMPLETE CBC W/AUTO DIFF WBC: CPT | Performed by: SURGERY

## 2021-11-16 PROCEDURE — 25010000002 HYDROMORPHONE PER 4 MG: Performed by: SURGERY

## 2021-11-16 PROCEDURE — 75710 ARTERY X-RAYS ARM/LEG: CPT | Performed by: SURGERY

## 2021-11-16 PROCEDURE — 85730 THROMBOPLASTIN TIME PARTIAL: CPT | Performed by: SURGERY

## 2021-11-16 PROCEDURE — 75710 ARTERY X-RAYS ARM/LEG: CPT

## 2021-11-16 PROCEDURE — C1757 CATH, THROMBECTOMY/EMBOLECT: HCPCS | Performed by: SURGERY

## 2021-11-16 PROCEDURE — 25010000002 PROPOFOL 10 MG/ML EMULSION: Performed by: NURSE ANESTHETIST, CERTIFIED REGISTERED

## 2021-11-16 PROCEDURE — 37226 PR REVSC OPN/PRQ FEM/POP W/STNT/ANGIOP SM VSL: CPT | Performed by: SURGERY

## 2021-11-16 PROCEDURE — 25010000002 MIDAZOLAM PER 1 MG: Performed by: ANESTHESIOLOGY

## 2021-11-16 PROCEDURE — 25010000002 CEFAZOLIN PER 500 MG: Performed by: SURGERY

## 2021-11-16 PROCEDURE — C1887 CATHETER, GUIDING: HCPCS | Performed by: SURGERY

## 2021-11-16 PROCEDURE — 25010000002 ALTEPLASE 2 MG RECONSTITUTED SOLUTION 1 EACH VIAL: Performed by: SURGERY

## 2021-11-16 PROCEDURE — 37228 PR REVSC OPN/PRQ TIB/PERO W/ANGIOPLASTY UNI: CPT | Performed by: SURGERY

## 2021-11-16 PROCEDURE — C1884 EMBOLIZATION PROTECT SYST: HCPCS | Performed by: SURGERY

## 2021-11-16 PROCEDURE — 80048 BASIC METABOLIC PNL TOTAL CA: CPT | Performed by: SURGERY

## 2021-11-16 PROCEDURE — 25010000002 FENTANYL CITRATE (PF) 100 MCG/2ML SOLUTION: Performed by: NURSE ANESTHETIST, CERTIFIED REGISTERED

## 2021-11-16 PROCEDURE — 37184 PRIM ART M-THRMBC 1ST VSL: CPT | Performed by: SURGERY

## 2021-11-16 PROCEDURE — 76000 FLUOROSCOPY <1 HR PHYS/QHP: CPT

## 2021-11-16 PROCEDURE — 75625 CONTRAST EXAM ABDOMINL AORTA: CPT | Performed by: SURGERY

## 2021-11-16 PROCEDURE — C1725 CATH, TRANSLUMIN NON-LASER: HCPCS | Performed by: SURGERY

## 2021-11-16 PROCEDURE — 25010000002 ONDANSETRON PER 1 MG: Performed by: NURSE ANESTHETIST, CERTIFIED REGISTERED

## 2021-11-16 PROCEDURE — 25010000002 IOPAMIDOL 61 % SOLUTION: Performed by: SURGERY

## 2021-11-16 PROCEDURE — 047M3DZ DILATION OF RIGHT POPLITEAL ARTERY WITH INTRALUMINAL DEVICE, PERCUTANEOUS APPROACH: ICD-10-PCS | Performed by: SURGERY

## 2021-11-16 PROCEDURE — C1769 GUIDE WIRE: HCPCS | Performed by: SURGERY

## 2021-11-16 PROCEDURE — 3E05317 INTRODUCTION OF OTHER THROMBOLYTIC INTO PERIPHERAL ARTERY, PERCUTANEOUS APPROACH: ICD-10-PCS | Performed by: SURGERY

## 2021-11-16 DEVICE — STNT PERIPH EVERFLEX ENTRUST 5F 6X40MM 120CM: Type: IMPLANTABLE DEVICE | Site: LEG | Status: FUNCTIONAL

## 2021-11-16 DEVICE — STNT PERIPH EVERFLEX ENTRUST 5F 6X60MM 120CM: Type: IMPLANTABLE DEVICE | Site: LEG | Status: FUNCTIONAL

## 2021-11-16 RX ORDER — PROPOFOL 10 MG/ML
VIAL (ML) INTRAVENOUS AS NEEDED
Status: DISCONTINUED | OUTPATIENT
Start: 2021-11-16 | End: 2021-11-16 | Stop reason: SURG

## 2021-11-16 RX ORDER — FLUMAZENIL 0.1 MG/ML
0.2 INJECTION INTRAVENOUS AS NEEDED
Status: DISCONTINUED | OUTPATIENT
Start: 2021-11-16 | End: 2021-11-16 | Stop reason: HOSPADM

## 2021-11-16 RX ORDER — BUPIVACAINE HCL/0.9 % NACL/PF 0.1 %
2 PLASTIC BAG, INJECTION (ML) EPIDURAL ONCE
Status: COMPLETED | OUTPATIENT
Start: 2021-11-16 | End: 2021-11-16

## 2021-11-16 RX ORDER — OXYCODONE AND ACETAMINOPHEN 10; 325 MG/1; MG/1
1 TABLET ORAL ONCE AS NEEDED
Status: DISCONTINUED | OUTPATIENT
Start: 2021-11-16 | End: 2021-11-16 | Stop reason: HOSPADM

## 2021-11-16 RX ORDER — ONDANSETRON 2 MG/ML
4 INJECTION INTRAMUSCULAR; INTRAVENOUS ONCE AS NEEDED
Status: DISCONTINUED | OUTPATIENT
Start: 2021-11-16 | End: 2021-11-16 | Stop reason: HOSPADM

## 2021-11-16 RX ORDER — ONDANSETRON 2 MG/ML
INJECTION INTRAMUSCULAR; INTRAVENOUS AS NEEDED
Status: DISCONTINUED | OUTPATIENT
Start: 2021-11-16 | End: 2021-11-16 | Stop reason: SURG

## 2021-11-16 RX ORDER — OXYCODONE AND ACETAMINOPHEN 7.5; 325 MG/1; MG/1
2 TABLET ORAL EVERY 4 HOURS PRN
Status: DISCONTINUED | OUTPATIENT
Start: 2021-11-16 | End: 2021-11-16 | Stop reason: HOSPADM

## 2021-11-16 RX ORDER — SODIUM CHLORIDE 0.9 % (FLUSH) 0.9 %
3-10 SYRINGE (ML) INJECTION AS NEEDED
Status: DISCONTINUED | OUTPATIENT
Start: 2021-11-16 | End: 2021-11-16 | Stop reason: HOSPADM

## 2021-11-16 RX ORDER — MIDAZOLAM HYDROCHLORIDE 1 MG/ML
1 INJECTION INTRAMUSCULAR; INTRAVENOUS
Status: DISCONTINUED | OUTPATIENT
Start: 2021-11-16 | End: 2021-11-16 | Stop reason: HOSPADM

## 2021-11-16 RX ORDER — FENTANYL CITRATE 50 UG/ML
25 INJECTION, SOLUTION INTRAMUSCULAR; INTRAVENOUS
Status: DISCONTINUED | OUTPATIENT
Start: 2021-11-16 | End: 2021-11-16 | Stop reason: HOSPADM

## 2021-11-16 RX ORDER — ACETAMINOPHEN 500 MG
1000 TABLET ORAL ONCE
Status: COMPLETED | OUTPATIENT
Start: 2021-11-16 | End: 2021-11-16

## 2021-11-16 RX ORDER — SODIUM CHLORIDE, SODIUM LACTATE, POTASSIUM CHLORIDE, CALCIUM CHLORIDE 600; 310; 30; 20 MG/100ML; MG/100ML; MG/100ML; MG/100ML
100 INJECTION, SOLUTION INTRAVENOUS CONTINUOUS
Status: DISCONTINUED | OUTPATIENT
Start: 2021-11-16 | End: 2021-11-17

## 2021-11-16 RX ORDER — LABETALOL HYDROCHLORIDE 5 MG/ML
5 INJECTION, SOLUTION INTRAVENOUS
Status: DISCONTINUED | OUTPATIENT
Start: 2021-11-16 | End: 2021-11-16 | Stop reason: HOSPADM

## 2021-11-16 RX ORDER — FENTANYL CITRATE 50 UG/ML
INJECTION, SOLUTION INTRAMUSCULAR; INTRAVENOUS AS NEEDED
Status: DISCONTINUED | OUTPATIENT
Start: 2021-11-16 | End: 2021-11-16 | Stop reason: SURG

## 2021-11-16 RX ORDER — ROCURONIUM BROMIDE 10 MG/ML
INJECTION, SOLUTION INTRAVENOUS AS NEEDED
Status: DISCONTINUED | OUTPATIENT
Start: 2021-11-16 | End: 2021-11-16 | Stop reason: SURG

## 2021-11-16 RX ORDER — LIDOCAINE HYDROCHLORIDE 10 MG/ML
0.5 INJECTION, SOLUTION EPIDURAL; INFILTRATION; INTRACAUDAL; PERINEURAL ONCE AS NEEDED
Status: DISCONTINUED | OUTPATIENT
Start: 2021-11-16 | End: 2021-11-16 | Stop reason: HOSPADM

## 2021-11-16 RX ORDER — HEPARIN SODIUM 1000 [USP'U]/ML
INJECTION, SOLUTION INTRAVENOUS; SUBCUTANEOUS AS NEEDED
Status: DISCONTINUED | OUTPATIENT
Start: 2021-11-16 | End: 2021-11-16 | Stop reason: SURG

## 2021-11-16 RX ORDER — NEOSTIGMINE METHYLSULFATE 5 MG/5 ML
SYRINGE (ML) INTRAVENOUS AS NEEDED
Status: DISCONTINUED | OUTPATIENT
Start: 2021-11-16 | End: 2021-11-16 | Stop reason: SURG

## 2021-11-16 RX ORDER — SODIUM CHLORIDE 0.9 % (FLUSH) 0.9 %
3 SYRINGE (ML) INJECTION EVERY 12 HOURS SCHEDULED
Status: DISCONTINUED | OUTPATIENT
Start: 2021-11-16 | End: 2021-11-16 | Stop reason: HOSPADM

## 2021-11-16 RX ADMIN — OXYCODONE AND ACETAMINOPHEN 1 TABLET: 7.5; 325 TABLET ORAL at 07:33

## 2021-11-16 RX ADMIN — FENTANYL CITRATE 100 MCG: 50 INJECTION, SOLUTION INTRAMUSCULAR; INTRAVENOUS at 11:19

## 2021-11-16 RX ADMIN — ACETAMINOPHEN 1000 MG: 500 TABLET, FILM COATED ORAL at 10:22

## 2021-11-16 RX ADMIN — HEPARIN SODIUM 4000 UNITS: 1000 INJECTION, SOLUTION INTRAVENOUS; SUBCUTANEOUS at 11:54

## 2021-11-16 RX ADMIN — ATORVASTATIN CALCIUM 40 MG: 40 TABLET, FILM COATED ORAL at 20:06

## 2021-11-16 RX ADMIN — ONDANSETRON 4 MG: 2 INJECTION INTRAMUSCULAR; INTRAVENOUS at 13:26

## 2021-11-16 RX ADMIN — GLYCOPYRROLATE 0.4 MG: 0.2 INJECTION, SOLUTION INTRAMUSCULAR; INTRAVENOUS at 13:26

## 2021-11-16 RX ADMIN — HYDROMORPHONE HYDROCHLORIDE 0.5 MG: 1 INJECTION, SOLUTION INTRAMUSCULAR; INTRAVENOUS; SUBCUTANEOUS at 03:03

## 2021-11-16 RX ADMIN — HEPARIN SODIUM 1000 UNITS: 1000 INJECTION, SOLUTION INTRAVENOUS; SUBCUTANEOUS at 12:55

## 2021-11-16 RX ADMIN — SODIUM CHLORIDE, POTASSIUM CHLORIDE, SODIUM LACTATE AND CALCIUM CHLORIDE 100 ML/HR: 600; 310; 30; 20 INJECTION, SOLUTION INTRAVENOUS at 10:24

## 2021-11-16 RX ADMIN — ROCURONIUM BROMIDE 50 MG: 10 INJECTION INTRAVENOUS at 11:19

## 2021-11-16 RX ADMIN — Medication 3 MG: at 13:26

## 2021-11-16 RX ADMIN — Medication 2 G: at 11:27

## 2021-11-16 RX ADMIN — MIDAZOLAM 1 MG: 1 INJECTION INTRAMUSCULAR; INTRAVENOUS at 10:22

## 2021-11-16 RX ADMIN — HEPARIN SODIUM 14.2 UNITS/KG/HR: 10000 INJECTION, SOLUTION INTRAVENOUS at 17:44

## 2021-11-16 RX ADMIN — ROCURONIUM BROMIDE 20 MG: 10 INJECTION INTRAVENOUS at 12:13

## 2021-11-16 RX ADMIN — PROPOFOL 200 MG: 10 INJECTION, EMULSION INTRAVENOUS at 11:19

## 2021-11-16 NOTE — ANESTHESIA POSTPROCEDURE EVALUATION
"Patient: Fiona Laguerre    Procedure Summary     Date: 11/16/21 Room / Location: [facility] OR  /  PAD HYBRID OR 12    Anesthesia Start: 1117 Anesthesia Stop: 1348    Procedure: AORTOGRAM/RIGHT LOWER EXTREMITY ANGIOGRAM, POSSIBLE THROMBOLYSIS, POSSIBLE OPEN FEMORAL THROMBECTOMY/EMBOLECTOMY (Right Thigh) Diagnosis:       Arterial occlusion      (Arterial occlusion [I70.90])    Surgeons: Karson Rivera MD Provider: Tejas Fuentes CRNA    Anesthesia Type: general ASA Status: 3 - Emergent          Anesthesia Type: general    Vitals  Vitals Value Taken Time   /68 11/16/21 1510   Temp 98.3 °F (36.8 °C) 11/16/21 1510   Pulse 51 11/16/21 1510   Resp 16 11/16/21 1510   SpO2 95 % 11/16/21 1510           Post Anesthesia Care and Evaluation    Patient location during evaluation: PACU  Patient participation: complete - patient participated  Level of consciousness: awake and alert  Pain management: adequate  Airway patency: patent  Anesthetic complications: No anesthetic complications    Cardiovascular status: acceptable  Respiratory status: acceptable  Hydration status: acceptable    Comments: Blood pressure 114/68, pulse 51, temperature 98.3 °F (36.8 °C), temperature source Oral, resp. rate 16, height 177.8 cm (70\"), weight 78.9 kg (174 lb), SpO2 95 %.    Pt discharged from PACU based on jl score >8      "

## 2021-11-16 NOTE — PAYOR COMM NOTE
"Fiona Laguerre (46 y.o. Male) [id number]   Admit 11/15  [facility] phone    Fax                Date of Birth Social Security Number Address Home Phone MRN    [id number]  [id number] [place] [address] [phone] [id number]    Baptism Marital Status             Non-Taoist Single       Admission Date Admission Type Admitting Provider Attending Provider Department, Room/Bed    11/15/21 Emergency Karson Rivera MD Cumbers, Jason Ronald, MD [facility] 3C, 396/1    Discharge Date Discharge Disposition Discharge Destination                         Attending Provider: Karson Rivera MD    Allergies: No Known Allergies    Isolation: None   Infection: None   Code Status: CPR   Advance Care Planning Activity    Ht: 177.8 cm (70\")   Wt: 78.9 kg (174 lb)    Admission Cmt: None   Principal Problem: None                Active Insurance as of 11/15/2021     Primary Coverage     Payor Plan Insurance Group Employer/Plan Group    ANTHEM MEDICAID AN[place]M MEDICAID [id number]     Payor Plan Address Payor Plan Phone Number Payor Plan Fax Number Effective Dates    [address] [address] [phone]  8/15/2019 - None Entered    [facility] [id number]       Subscriber Name Subscriber Birth Date Member ID       FIONA LAGUERRE 1975 [id number]                 Emergency Contacts      (Rel.) Home Phone Work Phone Mobile Phone    Tiff oCte (Mother) [phone] -- [phone]               History & Physical      Karson Rivera MD at 11/15/21 2122          Fiona CASAS Carlotta  [id number]  [id number]  TX20/20  Oh La MD  11/15/2021    Chief Complaint   Patient presents with   • Leg Pain       HPI: This is a 46-year-old gentleman with a past history of hyperlipidemia as well as peripheral arterial disease.  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  He ultimately " underwent exclusion of this area with an Endologix covered stent graft back in August 2019.  At that time he also had bilateral groin cutdowns for the procedure.  He subsequently presented in December 2019 with signs of right lower extremity ischemia and had an occluded SFA and required thrombectomy with stenting of the distal SFA as well as the above-knee popliteal artery.  He had done well however been living in [place] for a while with his sister.  In August 2020 he apparently had developed increasing claudication to the right lower extremity and saw a vascular surgeon there and underwent what sounds like angiogram with catheter directed thrombolysis and angioplasty of the distal SFA/popliteal artery stents due to thrombotic occlusion.  He had been continued on Xarelto and Plavix and had been doing well since that time.  I last saw him in the office in June and at that time he had palpable pulses in both feet and had no complaints.  Today he notes some increasing numbness to the right foot as well as some pain to the distal calf and foot.  It sounds like he has been having some claudication over the last few days.  He denies any significant pain when seen in the ER but on evaluation here the ER staff were unable to Doppler any pulses in the right foot.  He was started on heparin drip with bolus and he went for CTA of the abdominal aorta with bilateral lower extremity runoff which I did personally review.  It shows maintained patency of the previous aortic bifurcation stent graft and bilateral common femoral and profunda femoris arteries.  On the right the proximal SFA occludes just after its takeoff is occluded throughout the SFA as well as the proximal popliteal including the previously placed stents.  There is reconstitution of the below-knee popliteal artery with a patent trifurcation and contrast filling the tibial vessels down to the ankle.  When seen in the ER he has no other acute complaints.  His mother  is present with him at bedside.  He notes that he has not missed any doses of Xarelto or Plavix.    Past Medical History:   Diagnosis Date   • Hyperlipidemia        Past Surgical History:   Procedure Laterality Date   • ABDOMINAL AORTIC ANEURYSM REPAIR WITH ENDOGRAFT N/A 2019    Procedure: AORTOILIAC ANGIOGRAM, PLACEMENT OF AORTOILIAC STENTGRAFT, PLACEMENT OF VIABHAN STENT, THROMBECTOMY BILATERAL LOWER EXTREMITIES;  Surgeon: Karson Rivera MD;  Location:  [facility];  Service: Vascular   • FEMORAL THROMBECTOMY/EMBOLECTOMY Right 2019    Procedure: RIGHT LOWER EXTREMITY FEMORAL THROMBECTOMY/EMBOLECTOMY, POSSIBLE ANGIOGRAM, POSSIBLE INTERVENTION;  Surgeon: Karson Rivera MD;  Location:  [facility];  Service: Vascular       Family History   Problem Relation Age of Onset   • No Known Problems Mother    • No Known Problems Father        Social History     Socioeconomic History   • Marital status: Single   Tobacco Use   • Smoking status: Former Smoker     Packs/day: 1.50     Years: 25.00     Pack years: 37.50     Types: Cigarettes     Quit date: 2019     Years since quittin.2   • Smokeless tobacco: Never Used   • Tobacco comment: Pt stopped smoking   Substance and Sexual Activity   • Alcohol use: No   • Drug use: Never   • Sexual activity: Defer       No Known Allergies    (Not in a hospital admission)      Review of Systems   Constitutional: Negative.  Negative for activity change, appetite change, chills, diaphoresis, fatigue and fever.   HENT: Negative.  Negative for congestion, sneezing, sore throat and trouble swallowing.    Eyes: Negative.  Negative for visual disturbance.   Respiratory: Negative.  Negative for chest tightness and shortness of breath.    Cardiovascular: Negative.  Negative for chest pain, palpitations and leg swelling.        Some right distal calf and foot pain as well as some decrease sensation in the right foot starting earlier today.  "  Gastrointestinal: Negative.  Negative for abdominal distention, abdominal pain, nausea and vomiting.   Endocrine: Negative.    Genitourinary: Negative.    Musculoskeletal: Negative.    Skin: Negative.    Allergic/Immunologic: Negative.    Neurological: Negative.    Hematological: Negative.    Psychiatric/Behavioral: Negative.        /79 (BP Location: Right arm, Patient Position: Sitting)   Pulse 73   Temp 98.3 °F (36.8 °C) (Oral)   Resp 16   Ht 177.8 cm (70\")   Wt 78.9 kg (174 lb)   SpO2 100%   BMI 24.97 kg/m²   Physical Exam  Vitals reviewed.   Constitutional:       Appearance: Normal appearance.   HENT:      Head: Normocephalic and atraumatic.      Nose: Nose normal.      Mouth/Throat:      Mouth: Mucous membranes are moist.   Eyes:      Extraocular Movements: Extraocular movements intact.      Pupils: Pupils are equal, round, and reactive to light.   Cardiovascular:      Rate and Rhythm: Normal rate and regular rhythm.      Pulses:           Carotid pulses are 2+ on the right side and 2+ on the left side.       Radial pulses are 2+ on the right side and 2+ on the left side.        Femoral pulses are 2+ on the right side and 2+ on the left side.       Popliteal pulses are 0 on the right side and 2+ on the left side.        Dorsalis pedis pulses are detected w/ Doppler on the right side and 2+ on the left side.        Posterior tibial pulses are detected w/ Doppler on the right side and 2+ on the left side.      Comments: Previous bilateral groin incisions are well-healed.    Today he has palpable femoral pulses bilaterally.  On the right he does not have a popliteal pulse nor does he have palpable pulses in the right foot.  He does have very weak, monophasic Doppler signals at the right DP and PT.  On the left he has maintained popliteal, and DP/PT pulses that are palpable.  Motor function is intact to the right foot and he has some mildly decreased sensation but sensation is also intact.  Motor and " sensory function are normal to the left foot.  Pulmonary:      Effort: Pulmonary effort is normal. No respiratory distress.   Abdominal:      General: Abdomen is flat. There is no distension.      Palpations: Abdomen is soft. There is no mass.      Tenderness: There is no abdominal tenderness.   Musculoskeletal:         General: Normal range of motion.      Cervical back: Normal range of motion and neck supple.      Right lower leg: No edema.      Left lower leg: No edema.   Skin:     General: Skin is warm and dry.      Capillary Refill: Capillary refill takes less than 2 seconds.   Neurological:      General: No focal deficit present.      Mental Status: He is alert and oriented to person, place, and time.   Psychiatric:         Mood and Affect: Mood normal.         Behavior: Behavior normal.         Thought Content: Thought content normal.         Judgment: Judgment normal.         Lab Results (last 7 days)     Procedure Component Value Units Date/Time    COVID PRE-OP / PRE-PROCEDURE SCREENING ORDER (NO ISOLATION) - Swab, Nasal Cavity [[id number]]  (Normal) Collected: 11/15/21 1926    Specimen: Swab from Nasal Cavity Updated: 11/15/21 2037    Narrative:      The following orders were created for panel order COVID PRE-OP / PRE-PROCEDURE SCREENING ORDER (NO ISOLATION) - Swab, Nasal Cavity.  Procedure                               Abnormality         Status                     ---------                               -----------         ------                     COVID-19,[facility]...[[id number]]  Normal              Final result                 Please view results for these tests on the individual orders.    COVID-19,[facility] IN-HOUSE,Nasal Swab No Transport Media 3-4 HR TAT - Swab, Nasal Cavity [[id number]]  (Normal) Collected: 11/15/21 1926    Specimen: Swab from Nasal Cavity Updated: 11/15/21 2037     COVID19 Not Detected    Narrative:      Fact sheet for providers: https://www.fda.gov/media/275213/download      Fact sheet for patients: https://www.[employer].gov/media/572338/download    Test performed by PCR.    Consider negative results in combination with clinical observations, patient history, and epidemiological information.    Comprehensive Metabolic Panel [[id number]]  (Abnormal) Collected: 11/15/21 1926    Specimen: Blood Updated: 11/15/21 2001     Glucose 152 mg/dL      BUN 6 mg/dL      Creatinine 0.80 mg/dL      Sodium 132 mmol/L      Potassium 4.2 mmol/L      Chloride 95 mmol/L      CO2 25.0 mmol/L      Calcium 9.4 mg/dL      Total Protein 7.4 g/dL      Albumin 4.30 g/dL      ALT (SGPT) 23 U/L      AST (SGOT) 32 U/L      Alkaline Phosphatase 59 U/L      Total Bilirubin 0.3 mg/dL      eGFR Non African Amer 104 mL/min/1.73      Globulin 3.1 gm/dL      A/G Ratio 1.4 g/dL      BUN/Creatinine Ratio 7.5     Anion Gap 12.0 mmol/L     Narrative:      GFR Normal >60  Chronic Kidney Disease <60  Kidney Failure <15      aPTT [[id number]]  (Normal) Collected: 11/15/21 1926    Specimen: Blood Updated: 11/15/21 1948     PTT 28.1 seconds     Protime-INR [[id number]]  (Normal) Collected: 11/15/21 1926    Specimen: Blood Updated: 11/15/21 1948     Protime 12.7 Seconds      INR 0.99    CBC & Differential [[id number]]  (Abnormal) Collected: 11/15/21 1926    Specimen: Blood Updated: 11/15/21 1940    Narrative:      The following orders were created for panel order CBC & Differential.  Procedure                               Abnormality         Status                     ---------                               -----------         ------                     CBC Auto Differential[[id number]]        Abnormal            Final result                 Please view results for these tests on the individual orders.    CBC Auto Differential [[id number]]  (Abnormal) Collected: 11/15/21 1926    Specimen: Blood Updated: 11/15/21 1940     WBC 8.69 10*3/mm3      RBC 4.73 10*6/mm3      Hemoglobin 15.5 g/dL      Hematocrit 44.5 %      MCV 94.1 fL      MCH 32.8  pg      MCHC 34.8 g/dL      RDW 13.2 %      RDW-SD 46.2 fl      MPV 9.6 fL      Platelets 218 10*3/mm3      Neutrophil % 77.2 %      Lymphocyte % 15.1 %      Monocyte % 6.6 %      Eosinophil % 0.2 %      Basophil % 0.6 %      Immature Grans % 0.3 %      Neutrophils, Absolute 6.71 10*3/mm3      Lymphocytes, Absolute 1.31 10*3/mm3      Monocytes, Absolute 0.57 10*3/mm3      Eosinophils, Absolute 0.02 10*3/mm3      Basophils, Absolute 0.05 10*3/mm3      Immature Grans, Absolute 0.03 10*3/mm3      nRBC 0.0 /100 WBC           Imaging Results (Last 7 Days)     Procedure Component Value Units Date/Time    CT Angio Abdominal Aorta Bilateral Iliofem Runoff [[id number]] Resulted: 11/15/21 2114     Updated: 11/15/21 2059          Impression/Plan: 46-year-old gentleman who presents with signs of right lower extremity ischemia.  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  He then subsequently had right lower extremity SFA thrombotic occlusion in December 2019 requiring thrombectomy and stenting of the distal SFA and above-knee popliteal artery.  Finally he had developed again symptoms of ischemia while in [place] in August 2020 and had catheter directed thrombolysis and angioplasty of the SFA stents from what I can gather.  He now presents again with new ischemic symptoms in the right lower extremity with some pain and some decrease sensation to the right foot.  However he is able to ambulate and motor function remains intact on my exam he does have intact sensation.  His CTA of the abdominal aorta with runoff shows occlusion of the right SFA as well as the previously placed stents and it appears there may be a stent fracture in the more distal stent that is likely the culprit here.  He does have weak monophasic Doppler signals the DP and PT and there is opacification of those vessels on the CTA via reconstitution.  As such  there is no emergent need for the operating room tonight.  He will be admitted to my service and he will continue on the heparin drip that was started in the ER and he did receive a bolus.  Plan will be to keep him n.p.o. after midnight tonight and we will plan to proceed with angiogram tomorrow of the aorta and right lower extremity with possible need for open thrombectomy versus catheter directed therapy.  I will resume his home meds as appropriate.  I did review his pertinent labs and they all appear within normal limits.  Covid testing was negative in the ER this evening.    Karson Rivera MD      Electronically signed by Karson Rivera MD at 11/15/21 8353          Emergency Department Notes      Oh La MD at 11/15/21 4244          Subjective   Patient states that he noticed that he had a numb foot earlier in the afternoon.  He states that he has a history of blood clots.  States that he takes Plavix and Xarelto.  States that he has not missed any of his medications.  States that he had his surgery done by Dr. Washington.  States that he is not having any severe pain in the foot when he keeps it elevated.  States that has been able to walk on it.  Denies any chest pain, shortness of breath, abdominal pain, dysuria, hematuria, hematochezia or any other symptoms in his lower extremities or upper extremities.          Review of Systems   All other systems reviewed and are negative.      Past Medical History:   Diagnosis Date   • Hyperlipidemia        No Known Allergies    Past Surgical History:   Procedure Laterality Date   • ABDOMINAL AORTIC ANEURYSM REPAIR WITH ENDOGRAFT N/A 8/20/2019    Procedure: AORTOILIAC ANGIOGRAM, PLACEMENT OF AORTOILIAC STENTGRAFT, PLACEMENT OF VIABHAN STENT, THROMBECTOMY BILATERAL LOWER EXTREMITIES;  Surgeon: Karson Rivera MD;  Location: [facility] OR ;  Service: Vascular   • FEMORAL THROMBECTOMY/EMBOLECTOMY Right 12/19/2019    Procedure: RIGHT LOWER  EXTREMITY FEMORAL THROMBECTOMY/EMBOLECTOMY, POSSIBLE ANGIOGRAM, POSSIBLE INTERVENTION;  Surgeon: Karson Rivera MD;  Location:  [facility];  Service: Vascular       Family History   Problem Relation Age of Onset   • No Known Problems Mother    • No Known Problems Father        Social History     Socioeconomic History   • Marital status: Single   Tobacco Use   • Smoking status: Former Smoker     Packs/day: 1.50     Years: 25.00     Pack years: 37.50     Types: Cigarettes     Quit date: 2019     Years since quittin.2   • Smokeless tobacco: Never Used   • Tobacco comment: Pt stopped smoking   Substance and Sexual Activity   • Alcohol use: No   • Drug use: Never   • Sexual activity: Defer           Objective   Physical Exam  Vitals and nursing note reviewed.   Constitutional:       General: He is not in acute distress.     Appearance: He is not toxic-appearing or diaphoretic.   HENT:      Head: Normocephalic and atraumatic.      Nose: Nose normal.   Eyes:      General:         Right eye: No discharge.         Left eye: No discharge.      Extraocular Movements: Extraocular movements intact.      Conjunctiva/sclera: Conjunctivae normal.   Cardiovascular:      Rate and Rhythm: Normal rate.   Pulmonary:      Effort: Pulmonary effort is normal. No respiratory distress.      Breath sounds: No stridor. No rhonchi.   Abdominal:      General: Abdomen is flat.   Musculoskeletal:         General: No tenderness. Normal range of motion.      Cervical back: Normal range of motion and neck supple. No rigidity.      Left lower leg: No edema.   Skin:     Capillary Refill: Capillary refill takes 2 to 3 seconds.      Comments: Right foot cold to touch without dopplerable pulses in DP/PT. Palpable right femoral pulses.   Neurological:      General: No focal deficit present.      Mental Status: He is alert and oriented to person, place, and time.      Cranial Nerves: No cranial nerve deficit.      Sensory: No  sensory deficit.      Motor: No weakness.   Psychiatric:         Mood and Affect: Mood normal.         Behavior: Behavior normal.         Thought Content: Thought content normal.         Judgment: Judgment normal.         Procedures          ED Course                                           MDM    Final diagnoses:   Arterial occlusion       ED Disposition  ED Disposition     ED Disposition Condition Comment    Decision to Admit  Level of Care: Med/Surg [1]   Diagnosis: Arterial occlusion [743181]   Admitting Physician: KARSON RIVERA [[id number]]   Attending Physician: KARSON RIVERA [[id number]]   Certification: I Certify That Inpatient Hospital Services Are Medically Necessary For Greater Than 2 Midnights            No follow-up provider specified.       Medication List      No changes were made to your prescriptions during this visit.       Dr. Crawley saw the patient in the ED and advised IV heparin overnight and admission to his service.  The patient is currently stable in the ED     Oh La MD  11/15/21 2205      Electronically signed by Oh La MD at 11/15/21 2205     Sarah Tello RN at 11/15/21 1959        MD assessed patient in triage aware of no pulse to right foot.      Sarah Tello RN  11/15/21 1959      Electronically signed by Sarah Tello RN at 11/15/21 1959         Current Facility-Administered Medications   Medication Dose Route Frequency Provider Last Rate Last Admin   • atorvastatin (LIPITOR) tablet 40 mg  40 mg Oral Nightly Karson Rivera MD   40 mg at 11/15/21 2344   • clopidogrel (PLAVIX) tablet 75 mg  75 mg Oral Daily Karson Rivera MD       • heparin (porcine) injection 3,160 Units  40 Units/kg Intravenous PRN Roberto Swartz MD       • heparin (porcine) injection 6,310 Units  80 Units/kg Intravenous PRN Roberto Swartz MD   6,310 Units at 11/15/21 2027   • heparin [id number] units/250 mL (100 units/mL) in 0.45 % NaCl infusion  18  Units/kg/hr Intravenous Titrated Roberto Swartz MD 11.2 mL/hr at 11/16/21 0150 14.195 Units/kg/hr at 11/16/21 0150   • HYDROmorphone (DILAUDID) injection 0.5 mg  0.5 mg Intravenous Q2H PRN Karson Rivera MD   0.5 mg at 11/16/21 0303    And   • naloxone (NARCAN) injection 0.4 mg  0.4 mg Intravenous Q5 Min PRN Karson Rivera MD       • ondansetron (ZOFRAN) tablet 4 mg  4 mg Oral Q6H PRN Karson Rivera MD        Or   • ondansetron (ZOFRAN) injection 4 mg  4 mg Intravenous Q6H PRN Karson Rivera MD       • oxyCODONE-acetaminophen (PERCOCET) 7.5-325 MG per tablet 1 tablet  1 tablet Oral Q4H PRN Karson Rivera MD   1 tablet at 11/16/21 0733   • sodium chloride 0.9 % flush 10 mL  10 mL Intravenous Q12H Karson Rivera MD   10 mL at 11/15/21 2344   • sodium chloride 0.9 % flush 10 mL  10 mL Intravenous PRN Karson Rivera MD         11/16 nurse note :     Heparin gtt infusing. RLE cold, dusky. Pt c/o burning/tingling in R foot. Medicated for pain with percocet x1 and IV dilaudid x1. Angiogram today with possible thrombolysis/thrombectomy. Consent signed. NPO since midnight. Up ad lauri. Will continue to monitor  Dilaudid iv x1      Po percocet x1

## 2021-11-16 NOTE — ANESTHESIA PREPROCEDURE EVALUATION
Anesthesia Evaluation     Patient summary reviewed   no history of anesthetic complications:  NPO Solid Status: > 8 hours  NPO Liquid Status: > 6 hours           Airway   Mallampati: II  TM distance: >3 FB  Neck ROM: full  Dental    (+) edentulous    Pulmonary    (+) a smoker Former,   (-) COPD, asthma, sleep apnea  Cardiovascular   Exercise tolerance: good (4-7 METS)    ECG reviewed    (+) PVD, hyperlipidemia,   (-) pacemaker, past MI, angina, cardiac stents      Neuro/Psych  (-) seizures, TIA, CVA  GI/Hepatic/Renal/Endo    (-) GERD, liver disease, no renal disease, diabetes    Musculoskeletal     Abdominal    Substance History      OB/GYN          Other                          Anesthesia Plan    ASA 3 - emergent     general     intravenous induction     Anesthetic plan, all risks, benefits, and alternatives have been provided, discussed and informed consent has been obtained with: patient.

## 2021-11-16 NOTE — H&P
Fiona Laguerre  [id number]  [id number]  [place]20/20  Oh La MD  11/15/2021    Chief Complaint   Patient presents with   • Leg Pain       HPI: This is a 46-year-old gentleman with a past history of hyperlipidemia as well as peripheral arterial disease.  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  He ultimately underwent exclusion of this area with an Endologix covered stent graft back in August 2019.  At that time he also had bilateral groin cutdowns for the procedure.  He subsequently presented in December 2019 with signs of right lower extremity ischemia and had an occluded SFA and required thrombectomy with stenting of the distal SFA as well as the above-knee popliteal artery.  He had done well however been living in [place] for a while with his sister.  In August 2020 he apparently had developed increasing claudication to the right lower extremity and saw a vascular surgeon there and underwent what sounds like angiogram with catheter directed thrombolysis and angioplasty of the distal SFA/popliteal artery stents due to thrombotic occlusion.  He had been continued on Xarelto and Plavix and had been doing well since that time.  I last saw him in the office in June and at that time he had palpable pulses in both feet and had no complaints.  Today he notes some increasing numbness to the right foot as well as some pain to the distal calf and foot.  It sounds like he has been having some claudication over the last few days.  He denies any significant pain when seen in the ER but on evaluation here the ER staff were unable to Doppler any pulses in the right foot.  He was started on heparin drip with bolus and he went for CTA of the abdominal aorta with bilateral lower extremity runoff which I did personally review.  It shows maintained patency of the previous aortic bifurcation stent graft and bilateral common femoral and profunda femoris arteries.  On the right the  proximal SFA occludes just after its takeoff is occluded throughout the SFA as well as the proximal popliteal including the previously placed stents.  There is reconstitution of the below-knee popliteal artery with a patent trifurcation and contrast filling the tibial vessels down to the ankle.  When seen in the ER he has no other acute complaints.  His mother is present with him at bedside.  He notes that he has not missed any doses of Xarelto or Plavix.    Past Medical History:   Diagnosis Date   • Hyperlipidemia        Past Surgical History:   Procedure Laterality Date   • ABDOMINAL AORTIC ANEURYSM REPAIR WITH ENDOGRAFT N/A 2019    Procedure: AORTOILIAC ANGIOGRAM, PLACEMENT OF AORTOILIAC STENTGRAFT, PLACEMENT OF VIABHAN STENT, THROMBECTOMY BILATERAL LOWER EXTREMITIES;  Surgeon: Karson Rivera MD;  Location: [facility] OR ;  Service: Vascular   • FEMORAL THROMBECTOMY/EMBOLECTOMY Right 2019    Procedure: RIGHT LOWER EXTREMITY FEMORAL THROMBECTOMY/EMBOLECTOMY, POSSIBLE ANGIOGRAM, POSSIBLE INTERVENTION;  Surgeon: Karson Rivera MD;  Location: [facility] OR ;  Service: Vascular       Family History   Problem Relation Age of Onset   • No Known Problems Mother    • No Known Problems Father        Social History     Socioeconomic History   • Marital status: Single   Tobacco Use   • Smoking status: Former Smoker     Packs/day: 1.50     Years: 25.00     Pack years: 37.50     Types: Cigarettes     Quit date: 2019     Years since quittin.2   • Smokeless tobacco: Never Used   • Tobacco comment: Pt stopped smoking   Substance and Sexual Activity   • Alcohol use: No   • Drug use: Never   • Sexual activity: Defer       No Known Allergies    (Not in a hospital admission)      Review of Systems   Constitutional: Negative.  Negative for activity change, appetite change, chills, diaphoresis, fatigue and fever.   HENT: Negative.  Negative for congestion, sneezing, sore throat and trouble  "swallowing.    Eyes: Negative.  Negative for visual disturbance.   Respiratory: Negative.  Negative for chest tightness and shortness of breath.    Cardiovascular: Negative.  Negative for chest pain, palpitations and leg swelling.        Some right distal calf and foot pain as well as some decrease sensation in the right foot starting earlier today.   Gastrointestinal: Negative.  Negative for abdominal distention, abdominal pain, nausea and vomiting.   Endocrine: Negative.    Genitourinary: Negative.    Musculoskeletal: Negative.    Skin: Negative.    Allergic/Immunologic: Negative.    Neurological: Negative.    Hematological: Negative.    Psychiatric/Behavioral: Negative.        /79 (BP Location: Right arm, Patient Position: Sitting)   Pulse 73   Temp 98.3 °F (36.8 °C) (Oral)   Resp 16   Ht 177.8 cm (70\")   Wt 78.9 kg (174 lb)   SpO2 100%   BMI 24.97 kg/m²   Physical Exam  Vitals reviewed.   Constitutional:       Appearance: Normal appearance.   HENT:      Head: Normocephalic and atraumatic.      Nose: Nose normal.      Mouth/Throat:      Mouth: Mucous membranes are moist.   Eyes:      Extraocular Movements: Extraocular movements intact.      Pupils: Pupils are equal, round, and reactive to light.   Cardiovascular:      Rate and Rhythm: Normal rate and regular rhythm.      Pulses:           Carotid pulses are 2+ on the right side and 2+ on the left side.       Radial pulses are 2+ on the right side and 2+ on the left side.        Femoral pulses are 2+ on the right side and 2+ on the left side.       Popliteal pulses are 0 on the right side and 2+ on the left side.        Dorsalis pedis pulses are detected w/ Doppler on the right side and 2+ on the left side.        Posterior tibial pulses are detected w/ Doppler on the right side and 2+ on the left side.      Comments: Previous bilateral groin incisions are well-healed.    Today he has palpable femoral pulses bilaterally.  On the right he does not have " a popliteal pulse nor does he have palpable pulses in the right foot.  He does have very weak, monophasic Doppler signals at the right DP and PT.  On the left he has maintained popliteal, and DP/PT pulses that are palpable.  Motor function is intact to the right foot and he has some mildly decreased sensation but sensation is also intact.  Motor and sensory function are normal to the left foot.  Pulmonary:      Effort: Pulmonary effort is normal. No respiratory distress.   Abdominal:      General: Abdomen is flat. There is no distension.      Palpations: Abdomen is soft. There is no mass.      Tenderness: There is no abdominal tenderness.   Musculoskeletal:         General: Normal range of motion.      Cervical back: Normal range of motion and neck supple.      Right lower leg: No edema.      Left lower leg: No edema.   Skin:     General: Skin is warm and dry.      Capillary Refill: Capillary refill takes less than 2 seconds.   Neurological:      General: No focal deficit present.      Mental Status: He is alert and oriented to person, place, and time.   Psychiatric:         Mood and Affect: Mood normal.         Behavior: Behavior normal.         Thought Content: Thought content normal.         Judgment: Judgment normal.         Lab Results (last 7 days)     Procedure Component Value Units Date/Time    COVID PRE-OP / PRE-PROCEDURE SCREENING ORDER (NO ISOLATION) - Swab, Nasal Cavity [[id number]]  (Normal) Collected: 11/15/21 1926    Specimen: Swab from Nasal Cavity Updated: 11/15/21 2037    Narrative:      The following orders were created for panel order COVID PRE-OP / PRE-PROCEDURE SCREENING ORDER (NO ISOLATION) - Swab, Nasal Cavity.  Procedure                               Abnormality         Status                     ---------                               -----------         ------                     COVID-19,[facility]...[[id number]]  Normal              Final result                 Please view results for  these tests on the individual orders.    COVID-19,[facility] IN-HOUSE,Nasal Swab No Transport Media 3-4 HR TAT - Swab, Nasal Cavity [[id number]]  (Normal) Collected: 11/15/21 1926    Specimen: Swab from Nasal Cavity Updated: 11/15/21 2037     COVID19 Not Detected    Narrative:      Fact sheet for providers: https://www.fda.gov/media/114822/download     Fact sheet for patients: https://www.fda.gov/media/888101/download    Test performed by PCR.    Consider negative results in combination with clinical observations, patient history, and epidemiological information.    Comprehensive Metabolic Panel [[id number]]  (Abnormal) Collected: 11/15/21 1926    Specimen: Blood Updated: 11/15/21 2001     Glucose 152 mg/dL      BUN 6 mg/dL      Creatinine 0.80 mg/dL      Sodium 132 mmol/L      Potassium 4.2 mmol/L      Chloride 95 mmol/L      CO2 25.0 mmol/L      Calcium 9.4 mg/dL      Total Protein 7.4 g/dL      Albumin 4.30 g/dL      ALT (SGPT) 23 U/L      AST (SGOT) 32 U/L      Alkaline Phosphatase 59 U/L      Total Bilirubin 0.3 mg/dL      eGFR Non African Amer 104 mL/min/1.73      Globulin 3.1 gm/dL      A/G Ratio 1.4 g/dL      BUN/Creatinine Ratio 7.5     Anion Gap 12.0 mmol/L     Narrative:      GFR Normal >60  Chronic Kidney Disease <60  Kidney Failure <15      aPTT [[id number]]  (Normal) Collected: 11/15/21 1926    Specimen: Blood Updated: 11/15/21 1948     PTT 28.1 seconds     Protime-INR [[id number]]  (Normal) Collected: 11/15/21 1926    Specimen: Blood Updated: 11/15/21 1948     Protime 12.7 Seconds      INR 0.99    CBC & Differential [[id number]]  (Abnormal) Collected: 11/15/21 1926    Specimen: Blood Updated: 11/15/21 1940    Narrative:      The following orders were created for panel order CBC & Differential.  Procedure                               Abnormality         Status                     ---------                               -----------         ------                     CBC Auto Differential[[id number]]         Abnormal            Final result                 Please view results for these tests on the individual orders.    CBC Auto Differential [[id number]]  (Abnormal) Collected: 11/15/21 1926    Specimen: Blood Updated: 11/15/21 1940     WBC 8.69 10*3/mm3      RBC 4.73 10*6/mm3      Hemoglobin 15.5 g/dL      Hematocrit 44.5 %      MCV 94.1 fL      MCH 32.8 pg      MCHC 34.8 g/dL      RDW 13.2 %      RDW-SD 46.2 fl      MPV 9.6 fL      Platelets 218 10*3/mm3      Neutrophil % 77.2 %      Lymphocyte % 15.1 %      Monocyte % 6.6 %      Eosinophil % 0.2 %      Basophil % 0.6 %      Immature Grans % 0.3 %      Neutrophils, Absolute 6.71 10*3/mm3      Lymphocytes, Absolute 1.31 10*3/mm3      Monocytes, Absolute 0.57 10*3/mm3      Eosinophils, Absolute 0.02 10*3/mm3      Basophils, Absolute 0.05 10*3/mm3      Immature Grans, Absolute 0.03 10*3/mm3      nRBC 0.0 /100 WBC           Imaging Results (Last 7 Days)     Procedure Component Value Units Date/Time    CT Angio Abdominal Aorta Bilateral Iliofem Runoff [[id number]] Resulted: 11/15/21 2114     Updated: 11/15/21 2059          Impression/Plan: 46-year-old gentleman who presents with signs of right lower extremity ischemia.  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  He then subsequently had right lower extremity SFA thrombotic occlusion in December 2019 requiring thrombectomy and stenting of the distal SFA and above-knee popliteal artery.  Finally he had developed again symptoms of ischemia while in [place] in August 2020 and had catheter directed thrombolysis and angioplasty of the SFA stents from what I can gather.  He now presents again with new ischemic symptoms in the right lower extremity with some pain and some decrease sensation to the right foot.  However he is able to ambulate and motor function remains intact on my exam he does have intact sensation.  His CTA of  the abdominal aorta with runoff shows occlusion of the right SFA as well as the previously placed stents and it appears there may be a stent fracture in the more distal stent that is likely the culprit here.  He does have weak monophasic Doppler signals the DP and PT and there is opacification of those vessels on the CTA via reconstitution.  As such there is no emergent need for the operating room tonight.  He will be admitted to my service and he will continue on the heparin drip that was started in the ER and he did receive a bolus.  Plan will be to keep him n.p.o. after midnight tonight and we will plan to proceed with angiogram tomorrow of the aorta and right lower extremity with possible need for open thrombectomy versus catheter directed therapy.  I will resume his home meds as appropriate.  I did review his pertinent labs and they all appear within normal limits.  Covid testing was negative in the ER this evening.    Karson Rivera MD

## 2021-11-16 NOTE — OP NOTE
Fiona Laguerre  11/16/2021     PREOPERATIVE DIAGNOSIS: Arterial occlusion [I70.90]     POSTOPERATIVE DIAGNOSIS: Post-Op Diagnosis Codes:     * Arterial occlusion [I70.90]     PROCEDURE PERFORMED:  1.  Ultrasound-guided left common femoral artery access  2.  Placement of catheter in the abdominal aorta  3.  Aortoiliac angiogram  4.  Crossing of the aortic bifurcation and placement of catheter in the right common femoral artery  5.  Right lower extremity angiogram  6.  Crossing of a long SFA and proximal popliteal artery occlusion with selective catheter placement in the below-knee popliteal artery  7.  Placement of 5 mm spider embolic protection device in the right below-knee popliteal artery  8.  Power pulse thrombolysis of the right SFA and proximal popliteal artery using a 6 [place] AngioJet catheter  9.  Mechanical thrombectomy of the right SFA and proximal popliteal artery using the same 6 [place] AngioJet catheter  10.  Retrieval of the spider embolic protection device  11.  Angioplasty of the entire length of the SFA as well as the proximal and mid popliteal artery using a 5 x 200 mm ever cross balloon angioplasty catheter  12.  Stenting of a short segment dissection in the proximal SFA using a 6 x 40 mm ever flex self-expanding stent  13.  Post dilation of that stent using a 5 x 60 mm ever cross balloon  14.  Stenting of the distal SFA/proximal popliteal artery at the site of previous stent placement where there was stent fracture using a 6 x 60 mm ever flex self-expanding stent  15.  Post dilation of that stent using the same 5 x 60 mm ever cross balloon  16.  Selective cannulation of the right anterior tibial artery  17.  Angioplasty of the proximal anterior tibial artery using a 3 x 60 mm Bisi cross balloon catheter  18.  Completion angiogram  19.  Radiographic supervision and interpretation     SURGEON: Karson Rivera MD     ANESTHESIA: General.    PREPARATION: Routine.    STAFF: Circulator: Wayne Hurley  TEJAL HOFFMAN; Aylin Crane RN; Maribell Peña RN  Scrub Person: Joe Britt  Assistant: Mayra Bains    Estimated Blood Loss: minimal    SPECIMENS: None    COMPLICATIONS: None apparent    INDICATIONS: Fiona Laguerre is a 46 y.o. male with previous history of presentation with blue toe syndrome and was found to have thrombus of the aortic bifurcation.  This was treated with the placement of an Endologix endograft which covered the distal aorta and the proximal common iliac arteries.  This was also extended on the right into the common iliac artery with a Viabahn stent.  This was done in August 2019.  He returned in December 2019 with thrombotic occlusion of his right SFA and required thrombectomy as well as stenting of the distal SFA and proximal popliteal artery using two 6 mm self-expanding stents.  He had then moved to [place] for a short period and developed increasing claudication in the right lower extremity and when seen in [place] in August 2020 by vascular surgeon there underwent angiogram with apparent thrombolysis and angioplasty.  He had been doing well after that and I had seen him in the office in June of this year with a normal ALBERTO.  However he presented to the ER yesterday with increasing right foot pain over the last few days as well as some new onset numbness yesterday morning.  CTA with runoff showed patency of the previously placed aortoiliac stents however the right SFA was completely occluded from just after its takeoff with reconstitution of the below-knee popliteal artery and some flow within the tibials from reconstitution.  Given his findings and ongoing symptoms decision was made to bring him to the operating room for angiogram and possible intervention today. The indications, risks, and possible complications of the procedure were explained to the patient, who voiced understanding and wished to proceed with surgery.     PROCEDURE IN DETAIL: The patient was taken to the  operating room and placed on the operating table in a supine position. After general anesthesia was obtained, the entire right leg as well as the left groin was prepped and draped in a sterile manner.  Ultrasound-guided access of the left common femoral artery was then achieved using a micropuncture technique and micro sheath was placed.  Glidewire advantage was introduced up into the abdominal aorta and the micro sheath was exchanged over the wire for a short 6 [place] sheath.  Omni Flush catheter was advanced into the abdominal aorta and aortoiliac angiogram performed.  It showed wide patency of the previously placed Endologix stent graft and patency of the bilateral iliac systems.  At this point the Glidewire advantage was reintroduced and using the wire and the Omni Flush catheter I was able to traverse the bifurcation of the Endologix stent graft and placed the wire down into the profunda and the catheter was advanced over the bifurcation down to the level of the right common femoral artery.  Right lower extremity angiogram from here showed patency of the right common femoral as well as the profunda.  The SFA occluded just after its takeoff there was reconstitution via collaterals of the below-knee popliteal artery.  The previously placed stents in the distal SFA and proximal popliteal artery were also occluded.  It was also noted that there was a fracture of the more distal stent that had been also noted on the CT that was done.  At this point decision was made to try and cross this occlusion and intervene.  Given the onset of symptoms it was felt that this was likely fresh thrombus.  As such the Glidewire advantage was reintroduced down into the proximal SFA and now over the wire the short 6 [place] sheath was exchanged for a 6 [place] by 45 cm destination sheath which was able to be brought over the bifurcation and down to the level of the common femoral artery.  Now using a Ricardo cross catheter and the  Glidewire advantage I was able to traverse the entire length of the SFA popliteal artery and the catheter was placed out into the below-knee popliteal.  Angiogram showed I was in the true lumen.  There was patency of the below-knee popliteal artery as well as the trifurcation vessels below this level.  Now through the catheter a 5 mm spider embolic protection device was deployed in the distal popliteal artery.  Over this now a 6 [place] AngioJet catheter was advanced and initially used to perform power pulsed thrombolysis and a total of 10 mg of TPA was infused into the SFA and proximal popliteal and allowed to dwell for 15 minutes.  The same 6 [place] AngioJet catheter was then used to perform a chemical thrombectomy for a total of 163 seconds.  The catheter was then removed and angiogram showed some flow down through the SFA but there was still some stenosis in the popliteal artery just beyond the previously placed stents as well as a focal dissection area in the proximal SFA.  As such Glidewire advantage was reintroduced down into the below-knee popliteal artery.  Now over this wire a 5 x 200 mm ever cross balloon angioplasty catheter was advanced and used to angioplasty the proximal and mid popliteal artery as well as the entire length of the SFA from its takeoff.  Following this there was much more brisk flow down through the SFA but the small area of focal dissection remained in the proximal SFA and also the stent fracture in the distal previously placed popliteal artery stent was somewhat limiting flow.  As such decision was made to stent these 2 areas.  Initially the proximal SFA dissection was stented using a 6 x 40 mm ever flex self-expanding stent.  It was postdilated with a 5 x 60 mm ever cross balloon.  The distal area of stent fracture in the distal SFA/popliteal artery was then covered using a 6 x 60 mm ever flex self-expanding stent.  This was also postdilated with the same 5 x 60 mm balloon.  Now  completion angiogram showed good flow down through these areas with no residual stenosis or flow-limiting dissection.  There was noted to be an area of stenosis in the proximal anterior tibial artery at the bend.  As such using the Ricardo cross catheter and a 0.014 inch command wire the anterior tibial was selectively cannulated and the wire passed into the mid anterior tibial artery.  The proximal anterior tibial was then angioplastied using a 3 x 60 mm Bisi cross balloon.  The balloon was then removed and over the wire the Ricardo cross catheter was reintroduced into the popliteal artery.  200 mcg of nitroglycerin was infused down through this catheter directly below the knee and that there was some spasm of the tibial vessels.  Angiogram now through the catheter showed patency of these 3 vessels with good runoff out into the foot.  The catheter was then removed and final completion angiogram from the sheath all the way from the common femoral down showed wide patency of the SFA and the newly placed stents with good outflow through the distal popliteal artery and maintained runoff through a dominant posterior tibial as well as peroneal.  The flow through the AT was somewhat sluggish but did make it down to the foot.  At this point I felt the result was adequate.  As such the Glidewire advantage was reintroduced and the sheath pulled back up over the bifurcation and the wire advanced into the abdominal aorta.  The long 6 [place] sheath was then removed over the wire and exchanged for a short 6 [place] sheath and the wire removed.  A Mynx closure device was then deployed in the left common femoral artery.  Manual pressure was held for an additional 10 minutes to ensure hemostasis.  Sterile dressings were applied. The patient tolerated the procedure well. Sponge and needle counts were correct. The patient was then awakened and extubated in the operating room and taken to the recovery room in good condition.    Karson HOFFMAN  MD Miguel  Date: 11/16/2021 Time: 13:38 CST

## 2021-11-16 NOTE — ANESTHESIA PROCEDURE NOTES
Airway  Date/Time: 11/16/2021 11:21 AM  Airway not difficult    General Information and Staff    Patient location during procedure: OR  CRNA: Tejas Fuentes CRNA    Indications and Patient Condition  Indications for airway management: airway protection    Preoxygenated: yes  Mask difficulty assessment: 0 - not attempted    Final Airway Details  Final airway type: endotracheal airway      Successful airway: ETT  Cuffed: yes   Successful intubation technique: direct laryngoscopy  Endotracheal tube insertion site: oral  Blade: Mp  Blade size: 3.5  ETT size (mm): 7.0  Cormack-Lehane Classification: grade I - full view of glottis  Placement verified by: chest auscultation and capnometry   Cuff volume (mL): 6  Measured from: lips  ETT/EBT  to lips (cm): 22  Number of attempts at approach: 1  Assessment: lips, teeth, and gum same as pre-op and atraumatic intubation    Additional Comments  ATRAUMATIC INTUBATION

## 2021-11-16 NOTE — PLAN OF CARE
Goal Outcome Evaluation:  Plan of Care Reviewed With: patient        Progress: improving  Outcome Summary: Heparin gtt infusing. RLE cold, dusky. Pt c/o burning/tingling in R foot. Medicated for pain with percocet x1 and IV dilaudid x1. Angiogram today with possible thrombolysis/thrombectomy. Consent signed. NPO since midnight. Up ad lauri. Will continue to monitor.

## 2021-11-16 NOTE — INTERVAL H&P NOTE
H&P reviewed. The patient was examined and there are no changes to the H&P.  For aortogram/RLE angiogram, possible open RLE thrombectomy. Risks of the procedure were discussed.  These include, but are not limited to, bleeding, infection, vessel damage, nerve damage, embolus, and loss of limb.  The patient understands these risks and wishes to proceed with procedure.

## 2021-11-16 NOTE — ED PROVIDER NOTES
Subjective   Patient states that he noticed that he had a numb foot earlier in the afternoon.  He states that he has a history of blood clots.  States that he takes Plavix and Xarelto.  States that he has not missed any of his medications.  States that he had his surgery done by Dr. Washington.  States that he is not having any severe pain in the foot when he keeps it elevated.  States that has been able to walk on it.  Denies any chest pain, shortness of breath, abdominal pain, dysuria, hematuria, hematochezia or any other symptoms in his lower extremities or upper extremities.          Review of Systems   All other systems reviewed and are negative.      Past Medical History:   Diagnosis Date   • Hyperlipidemia        No Known Allergies    Past Surgical History:   Procedure Laterality Date   • ABDOMINAL AORTIC ANEURYSM REPAIR WITH ENDOGRAFT N/A 2019    Procedure: AORTOILIAC ANGIOGRAM, PLACEMENT OF AORTOILIAC STENTGRAFT, PLACEMENT OF VIABHAN STENT, THROMBECTOMY BILATERAL LOWER EXTREMITIES;  Surgeon: Karson Rivera MD;  Location:  [facility];  Service: Vascular   • FEMORAL THROMBECTOMY/EMBOLECTOMY Right 2019    Procedure: RIGHT LOWER EXTREMITY FEMORAL THROMBECTOMY/EMBOLECTOMY, POSSIBLE ANGIOGRAM, POSSIBLE INTERVENTION;  Surgeon: Karson Rivera MD;  Location:  [facility];  Service: Vascular       Family History   Problem Relation Age of Onset   • No Known Problems Mother    • No Known Problems Father        Social History     Socioeconomic History   • Marital status: Single   Tobacco Use   • Smoking status: Former Smoker     Packs/day: 1.50     Years: 25.00     Pack years: 37.50     Types: Cigarettes     Quit date: 2019     Years since quittin.2   • Smokeless tobacco: Never Used   • Tobacco comment: Pt stopped smoking   Substance and Sexual Activity   • Alcohol use: No   • Drug use: Never   • Sexual activity: Defer           Objective   Physical Exam  Vitals and  nursing note reviewed.   Constitutional:       General: He is not in acute distress.     Appearance: He is not toxic-appearing or diaphoretic.   HENT:      Head: Normocephalic and atraumatic.      Nose: Nose normal.   Eyes:      General:         Right eye: No discharge.         Left eye: No discharge.      Extraocular Movements: Extraocular movements intact.      Conjunctiva/sclera: Conjunctivae normal.   Cardiovascular:      Rate and Rhythm: Normal rate.   Pulmonary:      Effort: Pulmonary effort is normal. No respiratory distress.      Breath sounds: No stridor. No rhonchi.   Abdominal:      General: Abdomen is flat.   Musculoskeletal:         General: No tenderness. Normal range of motion.      Cervical back: Normal range of motion and neck supple. No rigidity.      Left lower leg: No edema.   Skin:     Capillary Refill: Capillary refill takes 2 to 3 seconds.      Comments: Right foot cold to touch without dopplerable pulses in DP/PT. Palpable right femoral pulses.   Neurological:      General: No focal deficit present.      Mental Status: He is alert and oriented to person, place, and time.      Cranial Nerves: No cranial nerve deficit.      Sensory: No sensory deficit.      Motor: No weakness.   Psychiatric:         Mood and Affect: Mood normal.         Behavior: Behavior normal.         Thought Content: Thought content normal.         Judgment: Judgment normal.         Procedures           ED Course                                           MDM    Final diagnoses:   Arterial occlusion       ED Disposition  ED Disposition     ED Disposition Condition Comment    Decision to Admit  Level of Care: Med/Surg [1]   Diagnosis: Arterial occlusion [674286]   Admitting Physician: LUIS ELLINGTON [[id number]]   Attending Physician: LUIS ELLINGTON [[id number]]   Certification: I Certify That Inpatient Hospital Services Are Medically Necessary For Greater Than 2 Midnights            No follow-up provider specified.        Medication List      No changes were made to your prescriptions during this visit.       Dr. Crawley saw the patient in the ED and advised IV heparin overnight and admission to his service.  The patient is currently stable in the ED     Oh La MD  11/15/21 1174

## 2021-11-16 NOTE — ED NOTES
MD assessed patient in triage aware of no pulse to right foot.      Tello, Sarah, RN  11/15/21 1959

## 2021-11-17 VITALS
SYSTOLIC BLOOD PRESSURE: 112 MMHG | HEART RATE: 57 BPM | WEIGHT: 174 LBS | TEMPERATURE: 98.3 F | OXYGEN SATURATION: 94 % | RESPIRATION RATE: 16 BRPM | DIASTOLIC BLOOD PRESSURE: 63 MMHG | HEIGHT: 70 IN | BODY MASS INDEX: 24.91 KG/M2

## 2021-11-17 LAB
APTT PPP: 114.4 SECONDS (ref 24.1–35)
APTT PPP: 181.2 SECONDS (ref 24.1–35)

## 2021-11-17 PROCEDURE — 99238 HOSP IP/OBS DSCHRG MGMT 30/<: CPT | Performed by: SURGERY

## 2021-11-17 PROCEDURE — 85730 THROMBOPLASTIN TIME PARTIAL: CPT | Performed by: SURGERY

## 2021-11-17 PROCEDURE — 25010000002 HEPARIN (PORCINE) PER 1000 UNITS: Performed by: SURGERY

## 2021-11-17 RX ADMIN — RIVAROXABAN 20 MG: 20 TABLET, FILM COATED ORAL at 10:08

## 2021-11-17 RX ADMIN — HEPARIN SODIUM 6310 UNITS: 1000 INJECTION, SOLUTION INTRAVENOUS; SUBCUTANEOUS at 03:41

## 2021-11-17 RX ADMIN — CLOPIDOGREL 75 MG: 75 TABLET, FILM COATED ORAL at 08:47

## 2021-11-17 NOTE — DISCHARGE SUMMARY
Date of Discharge:  11/17/2021    Discharge Diagnosis: Right lower extremity ischemia    Presenting Problem/History of Present Illness  Arterial occlusion [I70.90]       Hospital Course  Patient is a 46 y.o. male presented with increasing pain to the right distal lower leg and foot.  He has a history of blue toe syndrome and had thrombus at the aortic bifurcation that was excluded using an Endologix bifurcated stent graft.  He then subsequently presented in December 2019 with right lower extremity pain and was found to have an occluded right SFA.  He underwent thrombectomy at that time with ultimate stenting of the distal SFA and proximal popliteal artery.  He then had some issues in August 2020 while living in [place] with increasing ischemic signs in the right lower extremity and had an endovascular procedure in [place].  He had been doing well since then and he was seen last in my office back in June.  ABIs were normal then.  He presented to the ER 2 days ago with increasing pain and some numbness to the right foot.  He was found to have occluded stents in the right SFA/popliteal.  He was admitted and placed on a heparin drip.  Following morning he was taken to the operating room for aortogram and right lower extremity angiogram.  I performed power pulsed thrombolysis with the AngioJet catheter of the occluded stents as well as mechanical thrombectomy using the AngioJet catheter.  He then had angioplasty and restenting of an area of fracture in the previously placed stents, as well as stenting of a more proximal portion of the right SFA.  Perfusion was restored.  He tolerated the procedure well.  He was monitored on 3C overnight and remained on a heparin drip.  He has been voiding with his Shields removed and he has been ambulating.  He will be transitioned back to his Xarelto 20 mg daily and will also continue on his previous Plavix 75 mg daily.  He is also on a statin.  On exam this morning he is awake and  alert.  He has a dressing in place to the left groin at the access site which is soft with no hematoma.  He has a palpable pulse at the left femoral, popliteal, DP, and PT.  On the right he also has palpable femoral, popliteal, DP, and PT pulses.  At this point he is stable for discharge.  Instructions were given at the bedside and in writing and he will follow up with me in the office in 2 weeks.    Procedures Performed (11/16/21)  PROCEDURE PERFORMED:  1.  Ultrasound-guided left common femoral artery access  2.  Placement of catheter in the abdominal aorta  3.  Aortoiliac angiogram  4.  Crossing of the aortic bifurcation and placement of catheter in the right common femoral artery  5.  Right lower extremity angiogram  6.  Crossing of a long SFA and proximal popliteal artery occlusion with selective catheter placement in the below-knee popliteal artery  7.  Placement of 5 mm spider embolic protection device in the right below-knee popliteal artery  8.  Power pulse thrombolysis of the right SFA and proximal popliteal artery using a 6 [place]wan[place] AngioJet catheter  9.  Mechanical thrombectomy of the right SFA and proximal popliteal artery using the same 6 [place] AngioJet catheter  10.  Retrieval of the spider embolic protection device  11.  Angioplasty of the entire length of the SFA as well as the proximal and mid popliteal artery using a 5 x 200 mm ever cross balloon angioplasty catheter  12.  Stenting of a short segment dissection in the proximal SFA using a 6 x 40 mm ever flex self-expanding stent  13.  Post dilation of that stent using a 5 x 60 mm ever cross balloon  14.  Stenting of the distal SFA/proximal popliteal artery at the site of previous stent placement where there was stent fracture using a 6 x 60 mm ever flex self-expanding stent  15.  Post dilation of that stent using the same 5 x 60 mm ever cross balloon  16.  Selective cannulation of the right anterior tibial artery  17.  Angioplasty of the proximal  anterior tibial artery using a 3 x 60 mm Bisi cross balloon catheter  18.  Completion angiogram  19.  Radiographic supervision and interpretation       Consults:   Consults     No orders found from 10/17/2021 to 11/16/2021.            Condition on Discharge: Stable    Discharge Medications     Discharge Medications      Continue These Medications      Instructions Start Date   atorvastatin 40 MG tablet  Commonly known as: LIPITOR   40 mg, Oral, Nightly      clopidogrel 75 MG tablet  Commonly known as: Plavix   75 mg, Oral, Daily      gabapentin 100 MG capsule  Commonly known as: NEURONTIN   TAKE 1 CAPSULE BY MOUTH EVERY 6 TO 8 HOURS AS NEEDED FOR PAIN      HYDROcodone-acetaminophen 5-325 MG per tablet  Commonly known as: NORCO   1 tablet, Oral, 2 Times Daily      rivaroxaban 20 MG tablet  Commonly known as: Xarelto   20 mg, Oral, Daily             Discharge Diet:   Diet Instructions     Advance Diet As Tolerated      Diet: Regular      Discharge Diet: Regular          Activity at Discharge:   Activity Instructions     Activity as Tolerated      Other Activity Instructions      Activity Instructions: No heavy lifting over 10 pounds and no strenuous exercise or activity until seen in the office in 2 weeks.          Follow-up Appointments  Future Appointments   Date Time Provider Department Center   12/20/2021 11:00 AM [facility] CART 2  [facility]   12/20/2021 12:00 PM [facility] CT 2  [facility]   12/20/2021  1:15 PM Karson Rivera MD [facility]     Additional Instructions for the Follow-ups that You Need to Schedule     Discharge Follow-up with Specified Provider: Vascular-Dr. Rivera; 2 Weeks   As directed      To: Vascular-Dr. Rivera    Follow Up: 2 Weeks                Karson Rivera MD  11/17/21  08:42 CST

## 2021-11-17 NOTE — PLAN OF CARE
Goal Outcome Evaluation:   Tolerated surgical procedure well. Denies pain. Heparin gtt continues per protocol with no adverse reaction noted. VSS. Safety maintained.

## 2021-11-18 NOTE — PAYOR COMM NOTE
"[facility] 11-17-21  [id number]    Adryan Laguerre (46 y.o. Male)             Date of Birth Social Security Number Address Home Phone MRN    1975  [id number] [place] [phone] [id number]    Catholic Marital Status             Non-Mandaeism Single       Admission Date Admission Type Admitting Provider Attending Provider Department, Room/Bed    11/15/21 Emergency Karson Rivera MD  [facility] 3C, 396/1    Discharge Date Discharge Disposition Discharge Destination          11/17/2021 Home or Self Care              Attending Provider: (none)   Allergies: No Known Allergies    Isolation: None   Infection: None   Code Status: Prior   Advance Care Planning Activity    Ht: 177.8 cm (70\")   Wt: 78.9 kg (174 lb)    Admission Cmt: None   Principal Problem: None                Active Insurance as of 11/15/2021     Primary Coverage     Payor Plan Insurance Group Employer/Plan Group    [employer]HE[employer] MEDICAID [place] MEDICAID [id number]     Payor Plan Address Payor Plan Phone Number Payor Plan Fax Number Effective Dates    [address] [address] [phone]  8/15/2019 - None Entered    [facility] [id number]       Subscriber Name Subscriber Birth Date Member ID       ADRYAN LAGUERRE 1975 [id number]                 Emergency Contacts      (Rel.) Home Phone Work Phone Mobile Phone    Tiff Cote (Mother) [phone] -- [phone]               Discharge Summary      Karson Rivera MD at 11/17/21 0842            Date of Discharge:  11/17/2021    Discharge Diagnosis: Right lower extremity ischemia    Presenting Problem/History of Present Illness  Arterial occlusion [I70.90]       Hospital Course  Patient is a 46 y.o. male presented with increasing pain to the right distal lower leg and foot.  He has a history of blue toe syndrome and had thrombus at the aortic bifurcation that was excluded using an Endologix bifurcated stent graft.  He then subsequently " presented in December 2019 with right lower extremity pain and was found to have an occluded right SFA.  He underwent thrombectomy at that time with ultimate stenting of the distal SFA and proximal popliteal artery.  He then had some issues in August 2020 while living in [place] with increasing ischemic signs in the right lower extremity and had an endovascular procedure in [place].  He had been doing well since then and he was seen last in my office back in June.  ABIs were normal then.  He presented to the ER 2 days ago with increasing pain and some numbness to the right foot.  He was found to have occluded stents in the right SFA/popliteal.  He was admitted and placed on a heparin drip.  Following morning he was taken to the operating room for aortogram and right lower extremity angiogram.  I performed power pulsed thrombolysis with the AngioJet catheter of the occluded stents as well as mechanical thrombectomy using the AngioJet catheter.  He then had angioplasty and restenting of an area of fracture in the previously placed stents, as well as stenting of a more proximal portion of the right SFA.  Perfusion was restored.  He tolerated the procedure well.  He was monitored on 3C overnight and remained on a heparin drip.  He has been voiding with his Shields removed and he has been ambulating.  He will be transitioned back to his Xarelto 20 mg daily and will also continue on his previous Plavix 75 mg daily.  He is also on a statin.  On exam this morning he is awake and alert.  He has a dressing in place to the left groin at the access site which is soft with no hematoma.  He has a palpable pulse at the left femoral, popliteal, DP, and PT.  On the right he also has palpable femoral, popliteal, DP, and PT pulses.  At this point he is stable for discharge.  Instructions were given at the bedside and in writing and he will follow up with me in the office in 2 weeks.    Procedures Performed (11/16/21)  PROCEDURE  PERFORMED:  1.  Ultrasound-guided left common femoral artery access  2.  Placement of catheter in the abdominal aorta  3.  Aortoiliac angiogram  4.  Crossing of the aortic bifurcation and placement of catheter in the right common femoral artery  5.  Right lower extremity angiogram  6.  Crossing of a long SFA and proximal popliteal artery occlusion with selective catheter placement in the below-knee popliteal artery  7.  Placement of 5 mm spider embolic protection device in the right below-knee popliteal artery  8.  Power pulse thrombolysis of the right SFA and proximal popliteal artery using a 6 [place] AngioJet catheter  9.  Mechanical thrombectomy of the right SFA and proximal popliteal artery using the same 6 [place] AngioJet catheter  10.  Retrieval of the spider embolic protection device  11.  Angioplasty of the entire length of the SFA as well as the proximal and mid popliteal artery using a 5 x 200 mm ever cross balloon angioplasty catheter  12.  Stenting of a short segment dissection in the proximal SFA using a 6 x 40 mm ever flex self-expanding stent  13.  Post dilation of that stent using a 5 x 60 mm ever cross balloon  14.  Stenting of the distal SFA/proximal popliteal artery at the site of previous stent placement where there was stent fracture using a 6 x 60 mm ever flex self-expanding stent  15.  Post dilation of that stent using the same 5 x 60 mm ever cross balloon  16.  Selective cannulation of the right anterior tibial artery  17.  Angioplasty of the proximal anterior tibial artery using a 3 x 60 mm Bisi cross balloon catheter  18.  Completion angiogram  19.  Radiographic supervision and interpretation       Consults:   Consults     No orders found from 10/17/2021 to 11/16/2021.            Condition on Discharge: Stable    Discharge Medications     Discharge Medications      Continue These Medications      Instructions Start Date   atorvastatin 40 MG tablet  Commonly known as: LIPITOR   40 mg, Oral,  Nightly      clopidogrel 75 MG tablet  Commonly known as: Plavix   75 mg, Oral, Daily      gabapentin 100 MG capsule  Commonly known as: NEURONTIN   TAKE 1 CAPSULE BY MOUTH EVERY 6 TO 8 HOURS AS NEEDED FOR PAIN      HYDROcodone-acetaminophen 5-325 MG per tablet  Commonly known as: NORCO   1 tablet, Oral, 2 Times Daily      rivaroxaban 20 MG tablet  Commonly known as: Xarelto   20 mg, Oral, Daily             Discharge Diet:   Diet Instructions     Advance Diet As Tolerated      Diet: Regular      Discharge Diet: Regular          Activity at Discharge:   Activity Instructions     Activity as Tolerated      Other Activity Instructions      Activity Instructions: No heavy lifting over 10 pounds and no strenuous exercise or activity until seen in the office in 2 weeks.          Follow-up Appointments  Future Appointments   Date Time Provider Department Center   12/20/2021 11:00 AM [facility] CART 2  [facility]   12/20/2021 12:00 PM [facility] CT 2  [facility]   12/20/2021  1:15 PM aKrson Rivera MD [facility]     Additional Instructions for the Follow-ups that You Need to Schedule     Discharge Follow-up with Specified Provider: Vascular-Dr. Rivera; 2 Weeks   As directed      To: Vascular-Dr. Rivera    Follow Up: 2 Weeks                Karson Rivera MD  11/17/21  08:42 CST              Electronically signed by Karson Rivera MD at 11/17/21 0846

## 2021-12-03 ENCOUNTER — TELEPHONE (OUTPATIENT)
Dept: VASCULAR SURGERY | Facility: CLINIC | Age: 46
End: 2021-12-03

## 2021-12-06 ENCOUNTER — OFFICE VISIT (OUTPATIENT)
Dept: VASCULAR SURGERY | Facility: CLINIC | Age: 46
End: 2021-12-06

## 2021-12-06 VITALS
RESPIRATION RATE: 16 BRPM | HEIGHT: 70 IN | SYSTOLIC BLOOD PRESSURE: 124 MMHG | WEIGHT: 167 LBS | HEART RATE: 77 BPM | BODY MASS INDEX: 23.91 KG/M2 | DIASTOLIC BLOOD PRESSURE: 88 MMHG | OXYGEN SATURATION: 96 %

## 2021-12-06 DIAGNOSIS — Z98.890 PERIPHERAL ARTERIAL DISEASE WITH HISTORY OF REVASCULARIZATION (HCC): Primary | ICD-10-CM

## 2021-12-06 DIAGNOSIS — I73.9 PERIPHERAL ARTERIAL DISEASE (HCC): ICD-10-CM

## 2021-12-06 DIAGNOSIS — E78.2 MIXED HYPERLIPIDEMIA: ICD-10-CM

## 2021-12-06 DIAGNOSIS — I73.9 PERIPHERAL ARTERIAL DISEASE WITH HISTORY OF REVASCULARIZATION (HCC): Primary | ICD-10-CM

## 2021-12-06 DIAGNOSIS — Z72.0 TOBACCO ABUSE: ICD-10-CM

## 2021-12-06 PROCEDURE — 99214 OFFICE O/P EST MOD 30 MIN: CPT | Performed by: SURGERY

## 2021-12-06 RX ORDER — CLOPIDOGREL BISULFATE 75 MG/1
75 TABLET ORAL DAILY
COMMUNITY
End: 2021-12-06 | Stop reason: SDUPTHER

## 2021-12-06 RX ORDER — CLOPIDOGREL BISULFATE 75 MG/1
75 TABLET ORAL DAILY
Qty: 30 TABLET | Refills: 6 | Status: SHIPPED | OUTPATIENT
Start: 2021-12-06 | End: 2022-10-05 | Stop reason: SDUPTHER

## 2021-12-06 NOTE — PROGRESS NOTES
12/06/2021      Manas Zhu MD  [address] [facility] [address]        Fiona Laguerre  1975    Chief Complaint   Patient presents with   • Follow-up     S/p recent RLE angio and thrombolysis/mechanical thrombectomy of occluded SFA, with additional stent placement.   • Current smoker     Patient current smoker.       Dear Manas Zhu MD:    HPI     I had the pleasure of seeing your patient in the office today for follow up.  As you recall, the patient is a 46 y.o. male who we are currently following for known history of peripheral arterial disease with previous evidence of thrombus at the aortic bifurcation.  He had undergone exclusion of that area with an Endologix covered stent graft back in August 2019.  In December 2019 he represented with findings of right lower extremity ischemia and had occlusion of his right SFA which required thrombectomy and stenting of the distal SFA as well as the above-knee popliteal artery.  He had been doing well following that however he moved to [place] in August 2020 and was having again claudication symptoms while there and apparently underwent angiogram with thrombolysis from what he describes at a hospital in [facility].  He had continued doing well and was maintained on Xarelto and Plavix however in mid November presented here again with increasing pain and numbness to the right lower extremity.  CTA of the abdominal aorta with runoff was done which showed patency of the aortic stent graft however the right SFA was again occluded with reconstitution of the below-knee popliteal artery and trifurcation.  Ultimately he was admitted and started on a heparin drip and taken to the OR for aortogram and right lower extremity angiogram with AngioJet thrombolysis and mechanical thrombectomy of the right SFA and the previously placed stents.  He also had additional stenting of the distal SFA/proximal popliteal artery at the site of previous stent fracture and  "stenting of a short segment dissection in the proximal SFA.  He tolerated that procedure well and was discharged home the following day.  Today in the office he reports feeling well.  He has no pain in that right lower extremity and is ambulating well.  He is maintained on his Xarelto and Plavix and has not missed any doses.  He otherwise feels well and has no acute complaints.  Unfortunately he does continue to smoke a few cigarettes a day.    Review of Systems   Constitutional: Negative.  Negative for activity change, appetite change, chills, diaphoresis, fatigue and fever.   HENT: Negative.  Negative for congestion, sneezing, sore throat and trouble swallowing.    Eyes: Negative.  Negative for visual disturbance.   Respiratory: Negative.  Negative for chest tightness and shortness of breath.    Cardiovascular: Negative.  Negative for chest pain, palpitations and leg swelling.   Gastrointestinal: Negative.  Negative for abdominal distention, abdominal pain, nausea and vomiting.   Endocrine: Negative.    Genitourinary: Negative.    Musculoskeletal: Negative.    Skin: Negative.    Allergic/Immunologic: Negative.    Neurological: Negative.    Hematological: Negative.    Psychiatric/Behavioral: Negative.        /88 (BP Location: Left arm, Patient Position: Sitting, Cuff Size: Adult)   Pulse 77   Resp 16   Ht 177.8 cm (70\")   Wt 75.8 kg (167 lb)   SpO2 96%   BMI 23.96 kg/m²   Physical Exam  Vitals reviewed.   Constitutional:       Appearance: Normal appearance.   HENT:      Head: Normocephalic and atraumatic.      Nose: Nose normal.      Mouth/Throat:      Mouth: Mucous membranes are moist.   Eyes:      Extraocular Movements: Extraocular movements intact.      Pupils: Pupils are equal, round, and reactive to light.   Cardiovascular:      Rate and Rhythm: Normal rate and regular rhythm.      Pulses: Normal pulses.           Carotid pulses are 2+ on the right side and 2+ on the left side.       Radial pulses " are 2+ on the right side and 2+ on the left side.        Femoral pulses are 2+ on the right side and 2+ on the left side.       Popliteal pulses are 2+ on the right side and 2+ on the left side.        Dorsalis pedis pulses are 2+ on the right side and 2+ on the left side.        Posterior tibial pulses are 2+ on the right side and 2+ on the left side.      Comments: Previous bilateral groin incisions are well-healed.    Left groin access site from recent angiogram is also well-healed with no evidence of any hematoma or infection.  Pulmonary:      Effort: Pulmonary effort is normal. No respiratory distress.   Abdominal:      General: Abdomen is flat. There is no distension.      Palpations: Abdomen is soft. There is no mass.      Tenderness: There is no abdominal tenderness.   Musculoskeletal:         General: Normal range of motion.      Cervical back: Normal range of motion and neck supple.      Right lower leg: No edema.      Left lower leg: No edema.   Skin:     General: Skin is warm and dry.      Capillary Refill: Capillary refill takes less than 2 seconds.   Neurological:      General: No focal deficit present.      Mental Status: He is alert and oriented to person, place, and time.   Psychiatric:         Mood and Affect: Mood normal.         Behavior: Behavior normal.         Thought Content: Thought content normal.         Judgment: Judgment normal.         DIAGNOSTIC DATA:    IR Angiogram Extremity, FL C Arm During Surgery    Result Date: 11/21/2021  Narrative: Performed by Dr. Rivera. Please see procedure note. This report was finalized on 11/21/2021 10:32 by Dr. Karson Rivera MD.    CT Angio Abdominal Aorta Bilateral Iliofem Runoff    Result Date: 11/15/2021  Narrative: EXAM: CT ANGIO ABDOMINAL AORTA BILAT ILIOFEM RUNOFF-  INDICATION: lack of pulses in RLE, hx of vascular disease  COMPARISON: None available.  DOSE LENGTH PRODUCT: 844 mGy cm. Automated exposure control was also utilized to decrease  patient radiation dose.  3-D MIP images were obtained.  FINDINGS:  Complete occlusion of the RIGHT SFA just beyond its origin. Reconstitution occurs in the distal RIGHT popliteal artery. Patent RIGHT trifurcation. Anterior tibial, posterior tibial, and peroneal artery becomes nonvisualized in the distal calf. RIGHT profunda is widely patent. A RIGHT distal SFA stent is noted. There is focal area of stent discontinuity on image 16 of series 6.  LEFT SFA and profunda are widely patent. LEFT popliteal artery is widely patent. Patent LEFT trifurcation. Three-vessel LEFT lower extremity runoff.  Normal caliber abdominal aorta. Distal aortobiiliac stent grafting is widely patent. RIGHT common iliac stent is widely patent. Celiac artery and SMA are widely patent. BROOKE is excluded by stent graft. Bilateral common iliac, internal iliac, and external iliac arteries are widely patent. Both common femoral arteries are widely patent  No acute finding in the lung bases. Liver is steatotic. No arterial enhancing liver lesion. Gallbladder and biliary tree appear unremarkable. Pancreas, spleen, and RIGHT adrenal gland appear unremarkable. Unchanged LEFT adrenal gland nodule, likely adenoma. No solid renal mass. No urolithiasis or hydronephrosis. No focal urinary bladder wall thickening. Urinary bladder is mildly distended.  Normal appendix. No bowel obstruction or evidence of active bowel inflammation. No ascites or free pelvic fluid. No pelvic mass or pelvic collection. LEFT inguinal fat-containing hernia. No enlarged abdominal or pelvic lymph nodes. No aggressive osseous lesion.      Impression:  1.  Complete occlusion of the RIGHT superficial femoral artery. Reconstitution occurs at the distal RIGHT popliteal artery. Anterior tibial, posterior tibial, and peroneal arteries becomes nonopacified in the distal calf. 2.  RIGHT SFA stent with focal area of stent discontinuity on image 391 of series 4. Stent integrity and patency are  not evaluated given upstream occlusion. 3.  Three-vessel LEFT lower extremity runoff. 4.  Aortobiiliac stent graft without evidence of complication. 5.  Hepatic steatosis. This report was finalized on 11/15/2021 21:26 by Dr. Leo Mercedes MD.      Patient Active Problem List   Diagnosis   • Ischemic toe   • Thrombus of aorta (HCC)   • Tobacco abuse   • Mixed hyperlipidemia   • Blue toe syndrome (HCC)   • Atherosclerosis of native artery of right lower extremity with intermittent claudication (HCC)   • Arterial occlusion         ICD-10-CM ICD-9-CM   1. Peripheral arterial disease with history of revascularization (HCC)  I73.9 443.9    Z98.890 V45.89   2. Peripheral arterial disease (HCC)  I73.9 443.9   3. Mixed hyperlipidemia  E78.2 272.2   4. Tobacco abuse  Z72.0 305.1       Lab Frequency Next Occurrence   Follow Anesthesia Guidelines / Standing Orders Once 12/13/2019   Obtain Informed Consent Once 12/18/2019   Provide NPO Instructions to Patient Once 12/18/2019   Chlorhexidine Skin Prep Once 12/18/2019   CT Angiogram Abdomen Pelvis Once 12/18/2021   US Ankle / Brachial Indices Extremity Complete Once 12/18/2021       PLAN: After thoroughly evaluating Fiona Laguerre, I believe the best course of action is to remain conservative from a vascular standpoint.  Overall he has done well after his recent presentation with ischemia of the right lower extremity and subsequent angiogram with AngioJet thrombolysis and mechanical thrombectomy of the right SFA due to fracture of previously placed stents.  He had additional stenting across that area of fractured stent as well as in the proximal portion of the SFA where there was a short segment dissection.  Today in the office he has palpable pedal pulses in that right foot and it is warm.  Motor and sensation are intact.  Left lower extremity also remains intact as previous.  As such at this point plan will be to see him back in the office in 3 months with repeat ALBETRO/PVR for  continued surveillance.  He will be maintained on 20 mg Xarelto daily as well as Plavix 75 mg as previous.  The patient is to continue taking their medications as previously discussed.   I did discuss vascular risk factors as they pertain to the progression of vascular disease including controlling hyperlipidemia. These factors remain stable. Patient's Body mass index is 23.96 kg/m². indicating that he is within normal range (BMI 18.5-24.9). No BMI management plan needed.  Unfortunately he does continue to smoke.  We did have a long discussion today regarding the dangers of smoking in terms of peripheral arterial disease and his increased risk for future amputation if he continues to smoke.  He expressed understanding. This was all discussed in full with complete understanding.  Thank you for allowing me to participate in the care of your patient.  Please do not hesitate to call with any questions or concerns.  We will keep you aware of any further encounters with Fiona Laguerre.      Sincerely Yours,      Karson Rivera MD

## 2021-12-20 ENCOUNTER — APPOINTMENT (OUTPATIENT)
Dept: ULTRASOUND IMAGING | Facility: HOSPITAL | Age: 46
End: 2021-12-20

## 2021-12-20 ENCOUNTER — APPOINTMENT (OUTPATIENT)
Dept: CT IMAGING | Facility: HOSPITAL | Age: 46
End: 2021-12-20

## 2022-01-03 ENCOUNTER — TELEPHONE (OUTPATIENT)
Dept: PODIATRY | Facility: CLINIC | Age: 47
End: 2022-01-03

## 2022-01-03 DIAGNOSIS — I74.10 THROMBUS OF AORTA: ICD-10-CM

## 2022-03-04 ENCOUNTER — TELEPHONE (OUTPATIENT)
Dept: VASCULAR SURGERY | Facility: CLINIC | Age: 47
End: 2022-03-04

## 2022-03-04 NOTE — TELEPHONE ENCOUNTER
Called and left VM to mind pt of appts with arrival at 12:30 for 1:00 US and Follow-up appt with Dr Rivera at 2:00.

## 2022-03-07 ENCOUNTER — OFFICE VISIT (OUTPATIENT)
Dept: VASCULAR SURGERY | Facility: CLINIC | Age: 47
End: 2022-03-07

## 2022-03-07 ENCOUNTER — HOSPITAL ENCOUNTER (OUTPATIENT)
Dept: ULTRASOUND IMAGING | Facility: HOSPITAL | Age: 47
Discharge: HOME | End: 2022-03-07
Admitting: SURGERY

## 2022-03-07 VITALS
WEIGHT: 165 LBS | OXYGEN SATURATION: 97 % | BODY MASS INDEX: 23.62 KG/M2 | HEART RATE: 71 BPM | RESPIRATION RATE: 16 BRPM | DIASTOLIC BLOOD PRESSURE: 84 MMHG | HEIGHT: 70 IN | SYSTOLIC BLOOD PRESSURE: 132 MMHG

## 2022-03-07 DIAGNOSIS — I73.9 PERIPHERAL ARTERIAL DISEASE WITH HISTORY OF REVASCULARIZATION: Primary | ICD-10-CM

## 2022-03-07 DIAGNOSIS — Z98.890 PERIPHERAL ARTERIAL DISEASE WITH HISTORY OF REVASCULARIZATION: Primary | ICD-10-CM

## 2022-03-07 DIAGNOSIS — Z98.890 PERIPHERAL ARTERIAL DISEASE WITH HISTORY OF REVASCULARIZATION: ICD-10-CM

## 2022-03-07 DIAGNOSIS — I74.09 AORTOILIAC OCCLUSIVE DISEASE: ICD-10-CM

## 2022-03-07 DIAGNOSIS — I73.9 PERIPHERAL ARTERIAL DISEASE WITH HISTORY OF REVASCULARIZATION: ICD-10-CM

## 2022-03-07 DIAGNOSIS — E78.2 MIXED HYPERLIPIDEMIA: ICD-10-CM

## 2022-03-07 PROCEDURE — 99214 OFFICE O/P EST MOD 30 MIN: CPT | Performed by: SURGERY

## 2022-03-07 PROCEDURE — 93923 UPR/LXTR ART STDY 3+ LVLS: CPT

## 2022-03-07 PROCEDURE — 93923 UPR/LXTR ART STDY 3+ LVLS: CPT | Performed by: SURGERY

## 2022-03-07 NOTE — PROGRESS NOTES
03/07/2022      Manas Zhu MD  [address] [facility] [place] [address]        Fiona Laguerre  1975    Chief Complaint   Patient presents with   • Peripheral Vascular Disease     3 month follow-up with US 03/07/22.  Pt denies any stroke-like symptoms.   • Former-Smoker     Pt verified Former-Smoker - Quit:  08/16/2019             Dear Manas Zhu MD:    HPI     I had the pleasure of seeing your patient in the office today for follow up.  As you recall, the patient is a 46 y.o. male who we are currently following for known history of aortoiliac occlusive disease with thrombus as well as right lower extremity ischemia.  He had initially presented in 2019 with right lower extremity ischemia was found to have thrombus of the aortic bifurcation.  Ultimately he went right lower extremity thrombectomy with stenting of the distal SFA, as well as placement of a bifurcated aortoiliac covered stent with an Endologix device.  He had done well however had been living in [place] and had ischemia of the right lower extremity and required intervention there with angiogram and mechanical thrombolysis/thrombectomy.  He had subsequently then been seen here back in November 2021 again with right lower extremity ischemic symptoms and was found to have thrombosed stents in the right lower extremity with a fracture in the midportion of one of the stents.  Ultimately he underwent aortogram with right lower extremity angiogram and AngioJet thrombolysis/mechanical thrombectomy and restenting of the area to cover the area of stent fracture as well as stenting of the proximal SFA had a small area of apparent dissection.  He tolerated that procedure well and was subsequently discharged on postoperative day #1.  He remains on Plavix and Xarelto.  Today he had repeat ALBERTO/PVR done which I did review.  It shows normal ALBERTO values bilaterally with no evidence of any arterial insufficiency in the bilateral lower extremities.  He denies  "any claudication or rest pain symptoms in either leg otherwise notes he is doing well.  He has no other acute complaints today.      Review of Systems   Constitutional: Negative.  Negative for activity change, appetite change, chills, diaphoresis, fatigue and fever.   HENT: Negative.  Negative for congestion, sneezing, sore throat and trouble swallowing.    Eyes: Negative.  Negative for visual disturbance.   Respiratory: Negative.  Negative for chest tightness and shortness of breath.    Cardiovascular: Negative.  Negative for chest pain, palpitations and leg swelling.   Gastrointestinal: Negative.  Negative for abdominal distention, abdominal pain, nausea and vomiting.   Endocrine: Negative.    Genitourinary: Negative.    Musculoskeletal: Negative.    Skin: Negative.    Allergic/Immunologic: Negative.    Neurological: Negative.    Hematological: Negative.    Psychiatric/Behavioral: Negative.        /84 (BP Location: Left arm, Patient Position: Sitting, Cuff Size: Adult)   Pulse 71   Resp 16   Ht 177.8 cm (70\")   Wt 74.8 kg (165 lb)   SpO2 97%   BMI 23.68 kg/m²   Physical Exam  Vitals reviewed.   Constitutional:       Appearance: Normal appearance.   HENT:      Head: Normocephalic and atraumatic.      Nose: Nose normal.      Mouth/Throat:      Mouth: Mucous membranes are moist.   Eyes:      Extraocular Movements: Extraocular movements intact.      Pupils: Pupils are equal, round, and reactive to light.   Cardiovascular:      Rate and Rhythm: Normal rate and regular rhythm.      Pulses: Normal pulses.           Carotid pulses are 2+ on the right side and 2+ on the left side.       Radial pulses are 2+ on the right side and 2+ on the left side.        Femoral pulses are 2+ on the right side and 2+ on the left side.       Popliteal pulses are 2+ on the right side and 2+ on the left side.        Dorsalis pedis pulses are 2+ on the right side and 2+ on the left side.        Posterior tibial pulses are 2+ on " the right side and 2+ on the left side.      Comments: Previous bilateral groin incisions are well-healed.  Pulmonary:      Effort: Pulmonary effort is normal. No respiratory distress.   Abdominal:      General: Abdomen is flat. There is no distension.      Palpations: Abdomen is soft. There is no mass.      Tenderness: There is no abdominal tenderness.   Musculoskeletal:         General: Normal range of motion.      Cervical back: Normal range of motion and neck supple.      Right lower leg: No edema.      Left lower leg: No edema.   Skin:     General: Skin is warm and dry.      Capillary Refill: Capillary refill takes less than 2 seconds.   Neurological:      General: No focal deficit present.      Mental Status: He is alert and oriented to person, place, and time.   Psychiatric:         Mood and Affect: Mood normal.         Behavior: Behavior normal.         Thought Content: Thought content normal.         Judgment: Judgment normal.         DIAGNOSTIC DATA:        No results found.    Patient Active Problem List   Diagnosis   • Ischemic toe   • Thrombus of aorta ([facility])   • Tobacco abuse   • Mixed hyperlipidemia   • Blue toe syndrome ([facility])   • Atherosclerosis of native artery of right lower extremity with intermittent claudication ([facility])   • Arterial occlusion         ICD-10-CM ICD-9-CM   1. Peripheral arterial disease with history of revascularization ([facility])  I73.9 443.9    Z98.890 V45.89   2. Aortoiliac occlusive disease ([facility])  I74.09 444.09   3. Mixed hyperlipidemia  E78.2 272.2       Lab Frequency Next Occurrence   Follow Anesthesia Guidelines / Standing Orders Once 12/13/2019   Obtain Informed Consent Once 12/18/2019   Provide NPO Instructions to Patient Once 12/18/2019   Chlorhexidine Skin Prep Once 12/18/2019   US Ankle / Brachial Indices Extremity Complete Once 03/12/2022   US Ankle / Brachial Indices Extremity Complete Once 09/03/2022       PLAN: After thoroughly evaluating Fiona Laguerre, I believe the  best course of action is to remain conservative from a vascular standpoint.  Overall he has done well after his most recent aortogram and right lower extremity angiogram with thrombolysis/mechanical thrombectomy and stenting of the SFA at the site of previous stent fracture.  He had normal ABIs today and is ambulating well with no evidence of any claudication or any other complaints.  As such at this point he should continue on his Xarelto and Plavix as previous, and I will see him back in the office in 6 months with repeat ALBERTO/PVR for continued surveillance.  At the time of his next visit in 6 months he will also have CTA of the abdomen/pelvis for surveillance of the Endologix stent graft which was placed to the aortic bifurcation.  The patient is to otherwise continue taking their medications as previously discussed.   I did discuss vascular risk factors as they pertain to the progression of vascular disease including controlling hyperlipidemia. These factors remain stable. Patient's Body mass index is 23.68 kg/m². indicating that he is within normal range (BMI 18.5-24.9). No BMI management plan needed. This was all discussed in full with complete understanding.  Thank you for allowing me to participate in the care of your patient.  Please do not hesitate to call with any questions or concerns.  We will keep you aware of any further encounters with Fiona Laguerre.      Sincerely Yours,      Karson Rivera MD

## 2022-05-16 ENCOUNTER — TELEPHONE (OUTPATIENT)
Dept: VASCULAR SURGERY | Facility: CLINIC | Age: 47
End: 2022-05-16

## 2022-09-07 ENCOUNTER — APPOINTMENT (OUTPATIENT)
Dept: ULTRASOUND IMAGING | Facility: HOSPITAL | Age: 47
End: 2022-09-07

## 2022-09-07 ENCOUNTER — APPOINTMENT (OUTPATIENT)
Dept: CT IMAGING | Facility: HOSPITAL | Age: 47
End: 2022-09-07

## 2022-09-22 ENCOUNTER — TELEPHONE (OUTPATIENT)
Dept: VASCULAR SURGERY | Facility: CLINIC | Age: 47
End: 2022-09-22

## 2022-09-23 ENCOUNTER — HOSPITAL ENCOUNTER (OUTPATIENT)
Dept: CT IMAGING | Facility: HOSPITAL | Age: 47
End: 2022-09-23

## 2022-09-29 ENCOUNTER — TELEPHONE (OUTPATIENT)
Dept: VASCULAR SURGERY | Facility: CLINIC | Age: 47
End: 2022-09-29

## 2022-10-05 ENCOUNTER — TELEPHONE (OUTPATIENT)
Dept: VASCULAR SURGERY | Facility: CLINIC | Age: 47
End: 2022-10-05

## 2022-10-05 DIAGNOSIS — I74.10 THROMBUS OF AORTA: ICD-10-CM

## 2022-10-05 NOTE — TELEPHONE ENCOUNTER
Caller: Fiona Laguerre    Relationship: Self    Best call back number: [phone]    Requested Prescriptions:   Requested Prescriptions     Pending Prescriptions Disp Refills   • clopidogrel (PLAVIX) 75 MG tablet 30 tablet 6     Sig: Take 1 tablet by mouth Daily.   • rivaroxaban (Xarelto) 20 MG tablet 30 tablet 6     Sig: Take 1 tablet by mouth Daily.        Pharmacy where request should be sent:  [facility] [place]     Additional details provided by patient: [facility] OUT OF BOTH MEDICATIONS. PATIENT WOULD LIKE A CALL TO CONFIRM.     Does the patient have less than a 3 day supply:  [x] Yes  [] No    Helena Vallejo Rep   10/05/22 11:46 CDT

## 2022-10-05 NOTE — TELEPHONE ENCOUNTER
Caller: Fiona Laguerre    Relationship to patient: Self    Best call back number: [phone]    Chief complaint: MISSED APPOINTMENT     Type of visit: FOLLOW UP WITH CT     Requested date: NEXT AVAILABLE.    If rescheduling, when is the original appointment: September 23RD

## 2022-10-07 ENCOUNTER — TELEPHONE (OUTPATIENT)
Dept: PODIATRY | Facility: CLINIC | Age: 47
End: 2022-10-07

## 2022-10-07 NOTE — TELEPHONE ENCOUNTER
Pt mother called to get refill on plavix and xerelto. I directed her to call PCP for refills and if any problems to call us back

## 2022-10-12 RX ORDER — CLOPIDOGREL BISULFATE 75 MG/1
75 TABLET ORAL DAILY
Qty: 30 TABLET | Refills: 6 | Status: SHIPPED | OUTPATIENT
Start: 2022-10-12

## 2022-10-25 ENCOUNTER — TELEPHONE (OUTPATIENT)
Dept: VASCULAR SURGERY | Facility: CLINIC | Age: 47
End: 2022-10-25

## 2022-10-25 NOTE — TELEPHONE ENCOUNTER
Called to inform patient of new appointment. Testing will be 11/29/22 @ 1:30 @ [facility].Appointment with Miguel will be the following Friday 12/2/22 at 2:30PM. No answer, left VM with times, locations, phone numbers and addresses.

## 2022-11-21 ENCOUNTER — TELEPHONE (OUTPATIENT)
Dept: VASCULAR SURGERY | Facility: CLINIC | Age: 47
End: 2022-11-21

## 2022-11-21 NOTE — TELEPHONE ENCOUNTER
Attempted to reach patient to notify that Dr. Rivera will not be in office on afternoon of 12/02/2022.  Phone number is disconnected.  Lm on mothers phone per verbal.  Mailed out change at this time.

## 2022-11-29 ENCOUNTER — HOSPITAL ENCOUNTER (OUTPATIENT)
Dept: CT IMAGING | Facility: HOSPITAL | Age: 47
Discharge: HOME | End: 2022-11-29

## 2022-11-29 ENCOUNTER — HOSPITAL ENCOUNTER (OUTPATIENT)
Dept: ULTRASOUND IMAGING | Facility: HOSPITAL | Age: 47
Discharge: HOME | End: 2022-11-29

## 2022-11-29 DIAGNOSIS — I74.09 AORTOILIAC OCCLUSIVE DISEASE: ICD-10-CM

## 2022-11-29 DIAGNOSIS — Z98.890 PERIPHERAL ARTERIAL DISEASE WITH HISTORY OF REVASCULARIZATION: ICD-10-CM

## 2022-11-29 DIAGNOSIS — I73.9 PERIPHERAL ARTERIAL DISEASE WITH HISTORY OF REVASCULARIZATION: ICD-10-CM

## 2022-11-29 PROCEDURE — 0 IOPAMIDOL PER 1 ML: Performed by: SURGERY

## 2022-11-29 PROCEDURE — 74174 CTA ABD&PLVS W/CONTRAST: CPT

## 2022-11-29 PROCEDURE — 93923 UPR/LXTR ART STDY 3+ LVLS: CPT | Performed by: SURGERY

## 2022-11-29 PROCEDURE — 93923 UPR/LXTR ART STDY 3+ LVLS: CPT

## 2022-11-29 RX ADMIN — IOPAMIDOL 100 ML: 755 INJECTION, SOLUTION INTRAVENOUS at 14:06

## 2022-12-01 ENCOUNTER — TELEPHONE (OUTPATIENT)
Dept: VASCULAR SURGERY | Facility: CLINIC | Age: 47
End: 2022-12-01

## 2022-12-02 ENCOUNTER — OFFICE VISIT (OUTPATIENT)
Dept: VASCULAR SURGERY | Facility: CLINIC | Age: 47
End: 2022-12-02

## 2022-12-02 VITALS
HEART RATE: 66 BPM | WEIGHT: 163 LBS | SYSTOLIC BLOOD PRESSURE: 126 MMHG | BODY MASS INDEX: 23.34 KG/M2 | DIASTOLIC BLOOD PRESSURE: 80 MMHG | HEIGHT: 70 IN | OXYGEN SATURATION: 99 %

## 2022-12-02 DIAGNOSIS — I10 ESSENTIAL HYPERTENSION: ICD-10-CM

## 2022-12-02 DIAGNOSIS — E78.2 MIXED HYPERLIPIDEMIA: ICD-10-CM

## 2022-12-02 DIAGNOSIS — I73.9 PERIPHERAL ARTERIAL DISEASE WITH HISTORY OF REVASCULARIZATION: Primary | ICD-10-CM

## 2022-12-02 DIAGNOSIS — Z98.890 PERIPHERAL ARTERIAL DISEASE WITH HISTORY OF REVASCULARIZATION: Primary | ICD-10-CM

## 2022-12-02 PROCEDURE — 99214 OFFICE O/P EST MOD 30 MIN: CPT | Performed by: SURGERY

## 2022-12-02 RX ORDER — LISINOPRIL 10 MG/1
10 TABLET ORAL DAILY
COMMUNITY
Start: 2022-11-27

## 2022-12-02 NOTE — PROGRESS NOTES
12/2/2022      Ross Marie, CEASAR  [address]  [facility] [address]        Fiona Laguerre  1975    Chief Complaint   Patient presents with   • Follow-up     Follow up w/ ALBERTO & CTA. Last seen 03/07/22. States that he's been fine since last visit.        Dear Ross Marei, APRN:    HPI     I had the pleasure of seeing your patient in the office today for follow up.  As you recall, the patient is a 47 y.o. male who we are currently following for known history of aortoiliac occlusive disease with thrombus as well as right lower extremity ischemia.  He had initially presented in 2019 with right lower extremity ischemia was found to have thrombus of the aortic bifurcation.  Ultimately he underwent right lower extremity thrombectomy with stenting of the distal SFA, as well as placement of a bifurcated aortoiliac covered stent with an Endologix device.  He had done well however had been living in [place] and had ischemia of the right lower extremity and required intervention there with angiogram and mechanical thrombolysis/thrombectomy.  He had subsequently then been seen here back in November 2021 again with right lower extremity ischemic symptoms and was found to have thrombosed stents in the right lower extremity with a fracture in the midportion of one of the stents.  Ultimately he underwent aortogram with right lower extremity angiogram and AngioJet thrombolysis/mechanical thrombectomy and restenting of the area to cover the area of stent fracture as well as stenting of the proximal SFA had a small area of apparent dissection.  He tolerated that procedure well and was subsequently discharged on postoperative day #1.  He remains on Plavix and Xarelto.  He recently had repeat ALBERTO/PVR done which I did review.  It shows normal ALBERTO values bilaterally with no evidence of any arterial insufficiency in the bilateral lower extremities.  He denies any claudication or rest pain symptoms in  "either leg otherwise notes he is doing well.  He also had a CTA of the abdomen/pelvis on the previously placed endograft of the aortic bifurcation appears to be in good position and is widely patent.  He has no other acute complaints today.      Review of Systems   Constitutional: Negative.  Negative for activity change, appetite change, chills, diaphoresis, fatigue and fever.   HENT: Negative.  Negative for congestion, sneezing, sore throat and trouble swallowing.    Eyes: Negative.  Negative for visual disturbance.   Respiratory: Negative.  Negative for chest tightness and shortness of breath.    Cardiovascular: Negative.  Negative for chest pain, palpitations and leg swelling.   Gastrointestinal: Negative.  Negative for abdominal distention, abdominal pain, nausea and vomiting.   Endocrine: Negative.    Genitourinary: Negative.    Musculoskeletal: Negative.    Skin: Negative.    Allergic/Immunologic: Negative.    Neurological: Negative.    Hematological: Negative.    Psychiatric/Behavioral: Negative.        /80   Pulse 66   Ht 177.8 cm (70\")   Wt 73.9 kg (163 lb)   SpO2 99%   BMI 23.39 kg/m²   Physical Exam  Vitals reviewed.   Constitutional:       Appearance: Normal appearance.   HENT:      Head: Normocephalic and atraumatic.      Nose: Nose normal.      Mouth/Throat:      Mouth: Mucous membranes are moist.   Eyes:      Extraocular Movements: Extraocular movements intact.      Pupils: Pupils are equal, round, and reactive to light.   Cardiovascular:      Rate and Rhythm: Normal rate and regular rhythm.      Pulses: Normal pulses.           Carotid pulses are 2+ on the right side and 2+ on the left side.       Radial pulses are 2+ on the right side and 2+ on the left side.        Femoral pulses are 2+ on the right side and 2+ on the left side.       Popliteal pulses are 2+ on the right side and 2+ on the left side.        Dorsalis pedis pulses are 2+ on the right side and 2+ on the left side.        " Posterior tibial pulses are 2+ on the right side and 2+ on the left side.      Comments: Previous bilateral groin incisions are well-healed.  Pulmonary:      Effort: Pulmonary effort is normal. No respiratory distress.   Abdominal:      General: Abdomen is flat. There is no distension.      Palpations: Abdomen is soft. There is no mass.      Tenderness: There is no abdominal tenderness.   Musculoskeletal:         General: Normal range of motion.      Cervical back: Normal range of motion and neck supple.      Right lower leg: No edema.      Left lower leg: No edema.   Skin:     General: Skin is warm and dry.      Capillary Refill: Capillary refill takes less than 2 seconds.   Neurological:      General: No focal deficit present.      Mental Status: He is alert and oriented to person, place, and time.   Psychiatric:         Mood and Affect: Mood normal.         Behavior: Behavior normal.         Thought Content: Thought content normal.         Judgment: Judgment normal.         DIAGNOSTIC DATA:        CT Angiogram Abdomen Pelvis    Result Date: 11/29/2022  Narrative: EXAMINATION: CT ANGIOGRAM ABDOMEN PELVIS-   11/29/2022 1:57 PM CST  HISTORY: aortoiliac stent graft surveillance; I73.9-Peripheral vascular disease, unspecified; Z98.890-Other specified postprocedural states; I74.09-Other arterial embolism and thrombosis of abdominal aorta  In order to have a CT radiation dose as low as reasonably achievable Automated Exposure Control was utilized for adjustment of the mA and/or KV according to patient size.  DLP in mGycm= 609.  CT angiography protocol. CT imaging with bolus IV contrast injection. Under concurrent supervision axial, sagittal, coronal, three-dimensional, and MIP data sets were constructed.  Comparison is made with November 15, 2021.  Normal sized distal aorta and common iliac arteries with endograft present.  No aneurysm or dissection.  Normal heart size. Clear lung bases.  Normal appearance of the liver,  gallbladder, spleen, pancreas, right adrenal gland, and kidneys. Unchanged 13 x 10 mm left adrenal adenoma.  No bowel dilation. No diverticulitis or colitis.  Summary: 1. Stable abdomen/pelvis CTA with normal size aorta and a normal appearance of the aortic endograft.            This report was finalized on 11/29/2022 14:41 by Dr. Beau Baca MD.    US Ankle / Brachial Indices Extremity Complete    Result Date: 11/29/2022  Narrative:  History: PAD  Comments: Bilateral lower extremity arterial with multi-level pulse volume recordings and segmental pressures were performed at rest and stress.  The right ankle/brachial index is 1.08. The waveforms are triphasic without dampening.These findings are consistent with no significant arterial insufficiency of the right lower extremity at rest.  The left ankle/brachial index is 1.03. The waveforms are triphasic without dampening. These findings are consistent with no significant arterial insufficiency of the left lower extremity at rest.      Impression: Impression: 1. No significant arterial insufficiency of the right lower extremity at rest. 2. No significant arterial insufficiency of the left lower extremity at rest.   This report was finalized on 11/29/2022 14:49 by Dr. Alexandru James MD.      Patient Active Problem List   Diagnosis   • Ischemic toe   • Thrombus of aorta (HCC)   • Tobacco abuse   • Mixed hyperlipidemia   • Blue toe syndrome (HCC)   • Atherosclerosis of native artery of right lower extremity with intermittent claudication (HCC)   • Arterial occlusion         ICD-10-CM ICD-9-CM   1. Peripheral arterial disease with history of revascularization (HCC)  I73.9 443.9    Z98.890 V45.89   2. Essential hypertension  I10 401.9   3. Mixed hyperlipidemia  E78.2 272.2       Lab Frequency Next Occurrence   Follow Anesthesia Guidelines / Standing Orders Once 12/13/2019   Obtain Informed Consent Once 12/18/2019   Provide NPO Instructions to Patient Once 12/18/2019    Chlorhexidine Skin Prep Once 12/18/2019   US Ankle / Brachial Indices Extremity Complete Once 03/12/2022   US Ankle / Brachial Indices Extremity Complete Once 09/03/2022       PLAN: After thoroughly evaluating Fiona Laguerre, I believe the best course of action is to remain conservative from a vascular standpoint.  Overall he has done well after his most recent aortogram and right lower extremity angiogram with thrombolysis/mechanical thrombectomy and stenting of the SFA at the site of previous stent fracture.  He had normal ABIs recently and is ambulating well with no evidence of any claudication or any other complaints.  CTA of the abdomen/pelvis shows good position of his previously placed aortoiliac stent graft.  As such at this point he should continue on his Xarelto and Plavix as previous, and I will see him back in the office in 6 months with repeat ALBERTO/PVR for continued surveillance.  The patient is to otherwise continue taking their medications as previously discussed.   I did discuss vascular risk factors as they pertain to the progression of vascular disease including controlling hypertension, and hyperlipidemia. These factors are well controlled on his current regimen.  BMI is within normal parameters. No other follow-up for BMI required. This was all discussed in full with complete understanding.  Thank you for allowing me to participate in the care of your patient.  Please do not hesitate to call with any questions or concerns.  We will keep you aware of any further encounters with Fiona Laguerre.      Sincerely Yours,      Karson Rivera MD

## 2022-12-28 ENCOUNTER — TELEPHONE (OUTPATIENT)
Dept: VASCULAR SURGERY | Facility: CLINIC | Age: 47
End: 2022-12-28

## 2022-12-28 NOTE — TELEPHONE ENCOUNTER
Caller: Tiff Cote    Relationship: Mother    Best call back number: [phone]    What is the best time to reach you: AFTERNOONS AFTER 1:30    Who are you requesting to speak with (clinical staff, provider,  specific staff member): CLINICAL    What was the call regarding: PT MOTHER CALLING IN     PT HAD BRAIN BLEED &TAKEN OFF BLOOD THINNERS    PT MOM IS CURIOUS IF THEY SHOULD NOW RESTART BLOOD THINNERS AS PT IS NOW HAVING PROBLEMS WITH HIS LEG BEING OFF OF THEM.     Do you require a callback: YES PLEASE CALL BACK PT MOTHER TO ADVISE. THANK YOU!

## 2022-12-29 NOTE — TELEPHONE ENCOUNTER
Pt's mother states that pt was taken off of blood thinners when he had a brain bleed and wants to know when pt will be able to take them again.      Per Dr Rivera, patient will have to be advised by the doctor in [place] that cared for him and took him off of the blood thinners due to the brain bleed.    Spoke with the patient and relayed this information and pt voiced understanding.

## 2022-12-30 ENCOUNTER — TELEPHONE (OUTPATIENT)
Dept: VASCULAR SURGERY | Facility: CLINIC | Age: 47
End: 2022-12-30

## 2022-12-30 NOTE — TELEPHONE ENCOUNTER
----- Message from Karson Rivera MD sent at 12/29/2022 11:22 AM CST -----  Regarding: RE:  That will be up to whoever stopped it because of the brain bleed.  ----- Message -----  From: Milly Rene MA  Sent: 12/29/2022  12:20 PM EST  To: Karson Rivera MD    Pt's mother called and states that pt has been off of blood thinner for a month due to having a brain bleed and is now having leg pain.  She wants to know when he can go back on clopidogrel?

## 2022-12-30 NOTE — TELEPHONE ENCOUNTER
Called pt back and explained to him that he will have to call the doctor in [place] that stopped the blood thinner for the brain bleed, per Dr Rivera.      Pt voiced understanding.

## 2023-01-27 ENCOUNTER — TELEPHONE (OUTPATIENT)
Dept: VASCULAR SURGERY | Facility: CLINIC | Age: 48
End: 2023-01-27
Payer: MEDICAID

## 2023-01-27 NOTE — TELEPHONE ENCOUNTER
Caller: Tiff Cote    Relationship to patient: Mother    Best call back number:[phone]    Chief complaint: PT MOTHER CALLING IN STATING THAT SINCE HER SON HAS BEEN OFF BLOOD THINNERS - HE IS EXPERIENCING PAIN IN HIS LEGS THAT IS CAUSING HIM TO LIMP    PT MOTHER RESTING SOONER FOLLOWUP WITH DR ELLINGTON WITH TESTING     Type of visit: FOLLOWUP    Requested date: ASAP    If rescheduling, when is the original appointment: June 2023    Additional notes:PLEASE CONTACT PT MOTHER REGARDING IF ADDITIONAL TESTING WITH APPT IS POSSIBLE.

## 2023-01-27 NOTE — TELEPHONE ENCOUNTER
Called and left a message for Tiff letting her know that they should contact the office that stopped his Plavix for a brain bleed. She needs to let them know what has been going on and see what they recommend. I told her if she speaks to them and they don't have anything else to recommend, she could then contact our office and we would see about getting him in sooner.

## 2023-02-02 DIAGNOSIS — I73.9 PAD (PERIPHERAL ARTERY DISEASE): Primary | ICD-10-CM

## 2023-02-03 ENCOUNTER — TELEPHONE (OUTPATIENT)
Dept: VASCULAR SURGERY | Facility: CLINIC | Age: 48
End: 2023-02-03
Payer: MEDICAID

## 2023-02-06 ENCOUNTER — HOSPITAL ENCOUNTER (OUTPATIENT)
Dept: ULTRASOUND IMAGING | Facility: HOSPITAL | Age: 48
Discharge: HOME | End: 2023-02-06
Admitting: SURGERY
Payer: MEDICAID

## 2023-02-06 ENCOUNTER — TELEPHONE (OUTPATIENT)
Dept: VASCULAR SURGERY | Facility: CLINIC | Age: 48
End: 2023-02-06
Payer: MEDICAID

## 2023-02-06 ENCOUNTER — OFFICE VISIT (OUTPATIENT)
Dept: VASCULAR SURGERY | Facility: CLINIC | Age: 48
End: 2023-02-06
Payer: MEDICAID

## 2023-02-06 VITALS
HEIGHT: 70 IN | DIASTOLIC BLOOD PRESSURE: 76 MMHG | HEART RATE: 90 BPM | WEIGHT: 165 LBS | SYSTOLIC BLOOD PRESSURE: 128 MMHG | BODY MASS INDEX: 23.62 KG/M2 | OXYGEN SATURATION: 98 %

## 2023-02-06 DIAGNOSIS — I73.9 PAD (PERIPHERAL ARTERY DISEASE): ICD-10-CM

## 2023-02-06 DIAGNOSIS — E78.2 MIXED HYPERLIPIDEMIA: ICD-10-CM

## 2023-02-06 DIAGNOSIS — I10 ESSENTIAL HYPERTENSION: ICD-10-CM

## 2023-02-06 DIAGNOSIS — I73.9 PERIPHERAL ARTERIAL DISEASE WITH HISTORY OF REVASCULARIZATION: ICD-10-CM

## 2023-02-06 DIAGNOSIS — I70.221 ATHEROSCLEROSIS OF NATIVE ARTERY OF RIGHT LOWER EXTREMITY WITH REST PAIN: Primary | ICD-10-CM

## 2023-02-06 DIAGNOSIS — Z98.890 PERIPHERAL ARTERIAL DISEASE WITH HISTORY OF REVASCULARIZATION: ICD-10-CM

## 2023-02-06 PROCEDURE — 99214 OFFICE O/P EST MOD 30 MIN: CPT | Performed by: SURGERY

## 2023-02-06 PROCEDURE — 93923 UPR/LXTR ART STDY 3+ LVLS: CPT

## 2023-02-06 PROCEDURE — 93923 UPR/LXTR ART STDY 3+ LVLS: CPT | Performed by: SURGERY
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
## 2023-02-06 NOTE — TELEPHONE ENCOUNTER
SPOKE WITH PATIENT REGARDING SURGERY. PT WAS INFORMED OF PRE WORK ON 02/07 AT 1115. PT WAS ALSO INFORMED OF SURGERY ON 02/09 AT 1000. PT WAS INFORMED OF COVID TEST AND VISITATION. PT STATED UNDERSTANDING OF INSTRUCTIONS AND ALL INFORMATION.

## 2023-02-07 ENCOUNTER — PRE-ADMISSION TESTING (OUTPATIENT)
Dept: PREADMISSION TESTING | Facility: HOSPITAL | Age: 48
End: 2023-02-07
Payer: MEDICAID

## 2023-02-07 VITALS
RESPIRATION RATE: 18 BRPM | HEART RATE: 71 BPM | BODY MASS INDEX: 24.21 KG/M2 | OXYGEN SATURATION: 99 % | SYSTOLIC BLOOD PRESSURE: 123 MMHG | DIASTOLIC BLOOD PRESSURE: 73 MMHG | HEIGHT: 70 IN | WEIGHT: 169.09 LBS

## 2023-02-07 DIAGNOSIS — I70.221 ATHEROSCLEROSIS OF NATIVE ARTERY OF RIGHT LOWER EXTREMITY WITH REST PAIN: ICD-10-CM

## 2023-02-07 DIAGNOSIS — Z98.890 PERIPHERAL ARTERIAL DISEASE WITH HISTORY OF REVASCULARIZATION: ICD-10-CM

## 2023-02-07 DIAGNOSIS — I73.9 PERIPHERAL ARTERIAL DISEASE WITH HISTORY OF REVASCULARIZATION: ICD-10-CM

## 2023-02-07 LAB
ANION GAP SERPL CALCULATED.3IONS-SCNC: 10 MMOL/L (ref 5–15)
BASOPHILS # BLD AUTO: 0.09 10*3/MM3 (ref 0–0.2)
BASOPHILS NFR BLD AUTO: 1.3 % (ref 0–1.5)
BUN SERPL-MCNC: 7 MG/DL (ref 6–20)
BUN/CREAT SERPL: 9.3 (ref 7–25)
CALCIUM SPEC-SCNC: 9.3 MG/DL (ref 8.6–10.5)
CHLORIDE SERPL-SCNC: 102 MMOL/L (ref 98–107)
CO2 SERPL-SCNC: 26 MMOL/L (ref 22–29)
CREAT SERPL-MCNC: 0.75 MG/DL (ref 0.76–1.27)
DEPRECATED RDW RBC AUTO: 43.2 FL (ref 37–54)
EGFRCR SERPLBLD CKD-EPI 2021: 112 ML/MIN/1.73
EOSINOPHIL # BLD AUTO: 0.34 10*3/MM3 (ref 0–0.4)
EOSINOPHIL NFR BLD AUTO: 5 % (ref 0.3–6.2)
ERYTHROCYTE [DISTWIDTH] IN BLOOD BY AUTOMATED COUNT: 12.3 % (ref 12.3–15.4)
GLUCOSE SERPL-MCNC: 96 MG/DL (ref 65–99)
HCT VFR BLD AUTO: 43 % (ref 37.5–51)
HGB BLD-MCNC: 14.3 G/DL (ref 13–17.7)
IMM GRANULOCYTES # BLD AUTO: 0.02 10*3/MM3 (ref 0–0.05)
IMM GRANULOCYTES NFR BLD AUTO: 0.3 % (ref 0–0.5)
LYMPHOCYTES # BLD AUTO: 2.44 10*3/MM3 (ref 0.7–3.1)
LYMPHOCYTES NFR BLD AUTO: 35.7 % (ref 19.6–45.3)
MCH RBC QN AUTO: 31.6 PG (ref 26.6–33)
MCHC RBC AUTO-ENTMCNC: 33.3 G/DL (ref 31.5–35.7)
MCV RBC AUTO: 95.1 FL (ref 79–97)
MONOCYTES # BLD AUTO: 1 10*3/MM3 (ref 0.1–0.9)
MONOCYTES NFR BLD AUTO: 14.6 % (ref 5–12)
NEUTROPHILS NFR BLD AUTO: 2.95 10*3/MM3 (ref 1.7–7)
NEUTROPHILS NFR BLD AUTO: 43.1 % (ref 42.7–76)
NRBC BLD AUTO-RTO: 0 /100 WBC (ref 0–0.2)
PLATELET # BLD AUTO: 251 10*3/MM3 (ref 140–450)
PMV BLD AUTO: 9.7 FL (ref 6–12)
POTASSIUM SERPL-SCNC: 4.2 MMOL/L (ref 3.5–5.2)
RBC # BLD AUTO: 4.52 10*6/MM3 (ref 4.14–5.8)
SODIUM SERPL-SCNC: 138 MMOL/L (ref 136–145)
WBC NRBC COR # BLD: 6.84 10*3/MM3 (ref 3.4–10.8)

## 2023-02-07 PROCEDURE — 85025 COMPLETE CBC W/AUTO DIFF WBC: CPT

## 2023-02-07 PROCEDURE — 80048 BASIC METABOLIC PNL TOTAL CA: CPT

## 2023-02-07 PROCEDURE — 36415 COLL VENOUS BLD VENIPUNCTURE: CPT

## 2023-02-07 PROCEDURE — 93010 ELECTROCARDIOGRAM REPORT: CPT | Performed by: INTERNAL MEDICINE

## 2023-02-07 PROCEDURE — 93005 ELECTROCARDIOGRAM TRACING: CPT

## 2023-02-07 NOTE — DISCHARGE INSTRUCTIONS
Before you come to the hospital        Arrival time: AS DIRECTED BY OFFICE     YOU MAY TAKE THE FOLLOWING MEDICATION(S) THE MORNING OF SURGERY WITH A SIP OF WATER: hold lisinopril 24 hours prior to surgery; No medications morning of surgery             ALL OTHER HOME MEDICATION CHECK WITH YOUR PHYSICIAN (especially if   you are taking diabetes medicines or blood thinners)    Do not take any Erectile Dysfunction medications (EX: CIALIS, VIAGRA) 24 hours prior to surgery.      If you were given and instructed to use a germ- killing soap, use as directed the night before surgery and again the morning of surgery or as directed by your surgeon. (Use one-half of the bottle with each shower.)   See attached information for How to Use Chlorhexidine for Bathing if applicable.            Eating and drinking restrictions prior to scheduled arrival time    2 Hours before arrival time STOP   Drinking Clear liquids (water, apple juice-no pulp)     6 Hours before arrival time STOP   Milk or drinks that contain milk, full liquids    6 Hours before arrival time STOP   Light meals or foods, such as toast or cereal    8 Hours before arrival time STOP   Heavy foods, such as meat, fried foods, or fatty foods    (It is extremely important that you follow these guidelines to prevent delay or cancelation of your procedure)     Clear Liquids  Water and flavored water                                                                      Clear Fruit juices, such as cranberry juice and apple juice.  Black coffee (NO cream of any kind, including powdered).  Plain tea  Clear bouillon or broth.  Flavored gelatin.  Soda.  Gatorade or Powerade.  Full liquid examples  Juices that have pulp.  Frozen ice pops that contain fruit pieces.  Coffee with creamer  Milk.  Yogurt.                MANAGING PAIN AFTER SURGERY    We know you are probably wondering what your pain will be like after surgery.  Following surgery it is unrealistic to expect you will not  have pain.   Pain is how our bodies let us know that something is wrong or cautions us to be careful.  That said, our goal is to make your pain tolerable.    Methods we may use to treat your pain include (oral or IV medications, PCAs, epidurals, nerve blocks, etc.)   While some procedures require IV pain medications for a short time after surgery, transitioning to pain medications by mouth allows for better management of pain.   Your nurse will encourage you to take oral pain medications whenever possible.  IV medications work almost immediately, but only last a short while.  Taking medications by mouth allows for a more constant level of medication in your blood stream for a longer period of time.      Once your pain is out of control it is harder to get back under control.  It is important you are aware when your next dose of pain medication is due.  If you are admitted, your nurse may write the time of your next dose on the white board in your room to help you remember.      We are interested in your pain and encourage you to inform us about aggravating factors during your visit.   Many times a simple repositioning every few hours can make a big difference.    If your physician says it is okay, do not let your pain prevent you from getting out of bed. Be sure to call your nurse for assistance prior to getting up so you do not fall.      Before surgery, please decide your tolerable pain goal.  These faces help describe the pain ratings we use on a 0-10 scale.   Be prepared to tell us your goal and whether or not you take pain or anxiety medications at home.          Preparing for Surgery  Preparing for surgery is an important part of your care. It can make things go more smoothly and help you avoid complications. The steps leading up to surgery may vary among hospitals. Follow all instructions given to you by your health care providers. Ask questions if you do not understand something. Talk about any concerns that  you have.  Here are some questions to consider asking before your surgery:  If my surgery is not an emergency (is elective), when would be the best time to have the surgery?  What arrangements do I need to make for work, home, or school?  What will my recovery be like? How long will it be before I can return to normal activities?  Will I need to prepare my home? Will I need to arrange care for me or my children?  Should I expect to have pain after surgery? What are my pain management options? Are there nonmedical options that I can try for pain?  Tell a health care provider about:  Any allergies you have.  All medicines you are taking, including vitamins, herbs, eye drops, creams, and over-the-counter medicines.  Any problems you or family members have had with anesthetic medicines.  Any blood disorders you have.  Any surgeries you have had.  Any medical conditions you have.  Whether you are pregnant or may be pregnant.  What are the risks?  The risks and complications of surgery depend on the specific procedure that you have. Discuss all the risks with your health care providers before your surgery. Ask about common surgical complications, which may include:  Infection.  Bleeding or a need for blood replacement (transfusion).  Allergic reactions to medicines.  Damage to surrounding nerves, tissues, or structures.  A blood clot.  Scarring.  Failure of the surgery to correct the problem.  Follow these instructions before the procedure:  Several days or weeks before your procedure  You may have a physical exam by your primary health care provider to make sure it is safe for you to have surgery.  You may have testing. This may include a chest X-ray, blood and urine tests, electrocardiogram (ECG), or other testing.  Ask your health care provider about:  Changing or stopping your regular medicines. This is especially important if you are taking diabetes medicines or blood thinners.  Taking medicines such as aspirin and  ibuprofen. These medicines can thin your blood. Do not take these medicines unless your health care provider tells you to take them.  Taking over-the-counter medicines, vitamins, herbs, and supplements.  Do not use any products that contain nicotine or tobacco, such as cigarettes and e-cigarettes. If you need help quitting, ask your health care provider.  Avoid alcohol.  Ask your health care provider if there are exercises you can do to prepare for surgery.  Eat a healthy diet.   Plan to have someone take you home from the hospital or clinic.  Plan to have a responsible adult care for you for at least 24 hours after you leave the hospital or clinic. This is important.  The day before your procedure  You may be given antibiotic medicine to take by mouth to help prevent infection. Take it as told by your health care provider.  You may be asked to shower with a germ-killing soap.  Follow instructions from your health care provider about eating and drinking restrictions. This includes gum, mints and hard candy.  Pack comfortable clothes according to your procedure.   The day of your procedure  You may need to take another shower with a germ-killing soap before you leave home in the morning.  With a small sip of water, take only the medicines that you are told to take.  Remove all jewelry including rings.   Leave anything you consider valuable at home except hearing aids if needed.  You do not need to bring your home medications into the hospital.   Do not wear any makeup, nail polish, powder, deodorant, lotion, hair accessories, or anything on your skin or body except your clothes.  If you will be staying in the hospital, bring a case to hold your glasses, contacts, or dentures. You may also want to bring your robe and non-skid footwear.       (Do not use denture adhesives since you will be asked to remove them during  surgery).   If you wear oxygen at home, bring it with you the day of surgery.  If instructed by your  health care provider, bring your sleep apnea device with you on the day of your surgery (if this applies to you).  You may want to leave your suitcase and sleep apnea device in the car until after surgery.   Arrive at the hospital as scheduled.  Bring a friend or family member with you who can help to answer questions and be present while you meet with your health care provider.  At the hospital  When you arrive at the hospital:  Go to registration located at the main entrance of the hospital. You will be registered and given a beeper and a sticker sheet. Take the stickers to the Outpatient nurses desk and place in the black tray. This is to notify staff that you have arrived. Then return to the lobby to wait.   When your beeper lights up and vibrates proceed through the double doors, under the stairs, and a member of the Outpatient Surgery staff will escort you to your preoperative room.  You may have to wear compression sleeves. These help to prevent blood clots and reduce swelling in your legs.  An IV may be inserted into one of your veins.              In the operating room, you may be given one or more of the following:        A medicine to help you relax (sedative).        A medicine to numb the area (local anesthetic).        A medicine to make you fall asleep (general anesthetic).        A medicine that is injected into an area of your body to numb everything below the                      injection site (regional anesthetic).  You may be given an antibiotic through your IV to help prevent infection.  Your surgical site will be marked or identified.    Contact a health care provider if you:  Develop a fever of more than 100.4°F (38°C) or other feelings of illness during the 48 hours before your surgery.  Have symptoms that get worse.  Have questions or concerns about your surgery.  Summary  Preparing for surgery can make the procedure go more smoothly and lower your risk of complications.  Before surgery,  make a list of questions and concerns to discuss with your surgeon. Ask about the risks and possible complications.  In the days or weeks before your surgery, follow all instructions from your health care provider. You may need to stop smoking, avoid alcohol, follow eating restrictions, and change or stop your regular medicines.  Contact your surgeon if you develop a fever or other signs of illness during the few days before your surgery.  This information is not intended to replace advice given to you by your health care provider. Make sure you discuss any questions you have with your health care provider.  Document Revised: 12/21/2018 Document Reviewed: 10/23/2018  Elsevier Patient Education © 2021 [employer]

## 2023-02-08 LAB
QT INTERVAL: 402 MS
QTC INTERVAL: 421 MS

## 2023-02-09 ENCOUNTER — ANESTHESIA EVENT (OUTPATIENT)
Dept: PERIOP | Facility: HOSPITAL | Age: 48
End: 2023-02-09
Payer: MEDICAID

## 2023-02-09 ENCOUNTER — ANESTHESIA (OUTPATIENT)
Dept: PERIOP | Facility: HOSPITAL | Age: 48
End: 2023-02-09
Payer: MEDICAID

## 2023-02-09 ENCOUNTER — HOSPITAL ENCOUNTER (OUTPATIENT)
Facility: HOSPITAL | Age: 48
Setting detail: HOSPITAL OUTPATIENT SURGERY
Discharge: HOME | End: 2023-02-09
Attending: SURGERY | Admitting: SURGERY
Payer: MEDICAID

## 2023-02-09 ENCOUNTER — APPOINTMENT (OUTPATIENT)
Dept: INTERVENTIONAL RADIOLOGY/VASCULAR | Facility: HOSPITAL | Age: 48
End: 2023-02-09
Payer: MEDICAID

## 2023-02-09 VITALS
HEART RATE: 84 BPM | TEMPERATURE: 97.3 F | RESPIRATION RATE: 16 BRPM | SYSTOLIC BLOOD PRESSURE: 120 MMHG | DIASTOLIC BLOOD PRESSURE: 84 MMHG | OXYGEN SATURATION: 94 %

## 2023-02-09 DIAGNOSIS — I70.221 ATHEROSCLEROSIS OF NATIVE ARTERY OF RIGHT LOWER EXTREMITY WITH REST PAIN: ICD-10-CM

## 2023-02-09 DIAGNOSIS — Z98.890 PERIPHERAL ARTERIAL DISEASE WITH HISTORY OF REVASCULARIZATION: ICD-10-CM

## 2023-02-09 DIAGNOSIS — I73.9 PERIPHERAL ARTERIAL DISEASE WITH HISTORY OF REVASCULARIZATION: ICD-10-CM

## 2023-02-09 LAB
ABO GROUP BLD: NORMAL
BLD GP AB SCN SERPL QL: NEGATIVE
RH BLD: POSITIVE
T&S EXPIRATION DATE: NORMAL

## 2023-02-09 PROCEDURE — C1894 INTRO/SHEATH, NON-LASER: HCPCS | Performed by: SURGERY

## 2023-02-09 PROCEDURE — 25010000002 HEPARIN (PORCINE) PER 1000 UNITS: Performed by: NURSE ANESTHETIST, CERTIFIED REGISTERED

## 2023-02-09 PROCEDURE — 0 LIDOCAINE 1 % SOLUTION: Performed by: SURGERY

## 2023-02-09 PROCEDURE — 86850 RBC ANTIBODY SCREEN: CPT | Performed by: SURGERY

## 2023-02-09 PROCEDURE — C1884 EMBOLIZATION PROTECT SYST: HCPCS | Performed by: SURGERY

## 2023-02-09 PROCEDURE — C1725 CATH, TRANSLUMIN NON-LASER: HCPCS | Performed by: SURGERY

## 2023-02-09 PROCEDURE — 86901 BLOOD TYPING SEROLOGIC RH(D): CPT | Performed by: SURGERY

## 2023-02-09 PROCEDURE — 76000 FLUOROSCOPY <1 HR PHYS/QHP: CPT

## 2023-02-09 PROCEDURE — 75710 ARTERY X-RAYS ARM/LEG: CPT

## 2023-02-09 PROCEDURE — 25010000002 ONDANSETRON PER 1 MG: Performed by: NURSE ANESTHETIST, CERTIFIED REGISTERED

## 2023-02-09 PROCEDURE — C1887 CATHETER, GUIDING: HCPCS | Performed by: SURGERY

## 2023-02-09 PROCEDURE — 25010000002 HEPARIN (PORCINE) PER 1000 UNITS: Performed by: SURGERY

## 2023-02-09 PROCEDURE — 25010000002 IOPAMIDOL 61 % SOLUTION: Performed by: SURGERY

## 2023-02-09 PROCEDURE — 25010000002 CEFAZOLIN PER 500 MG: Performed by: SURGERY

## 2023-02-09 PROCEDURE — 25010000002 HYDROMORPHONE PER 4 MG: Performed by: ANESTHESIOLOGY

## 2023-02-09 PROCEDURE — C1769 GUIDE WIRE: HCPCS | Performed by: SURGERY

## 2023-02-09 PROCEDURE — 25010000002 FENTANYL CITRATE (PF) 50 MCG/ML SOLUTION: Performed by: NURSE ANESTHETIST, CERTIFIED REGISTERED

## 2023-02-09 PROCEDURE — 75710 ARTERY X-RAYS ARM/LEG: CPT | Performed by: SURGERY

## 2023-02-09 PROCEDURE — C1760 CLOSURE DEV, VASC: HCPCS | Performed by: SURGERY

## 2023-02-09 PROCEDURE — 86900 BLOOD TYPING SEROLOGIC ABO: CPT | Performed by: SURGERY

## 2023-02-09 PROCEDURE — 25010000002 PROPOFOL 10 MG/ML EMULSION: Performed by: NURSE ANESTHETIST, CERTIFIED REGISTERED

## 2023-02-09 PROCEDURE — 37224 PR REVSC OPN/PRG FEM/POP W/ANGIOPLASTY UNI: CPT | Performed by: SURGERY

## 2023-02-09 PROCEDURE — 75625 CONTRAST EXAM ABDOMINL AORTA: CPT | Performed by: SURGERY

## 2023-02-09 RX ORDER — FENTANYL CITRATE 50 UG/ML
INJECTION, SOLUTION INTRAMUSCULAR; INTRAVENOUS AS NEEDED
Status: DISCONTINUED | OUTPATIENT
Start: 2023-02-09 | End: 2023-02-09 | Stop reason: SURG

## 2023-02-09 RX ORDER — SODIUM CHLORIDE 0.9 % (FLUSH) 0.9 %
3 SYRINGE (ML) INJECTION EVERY 12 HOURS SCHEDULED
Status: DISCONTINUED | OUTPATIENT
Start: 2023-02-09 | End: 2023-02-09 | Stop reason: HOSPADM

## 2023-02-09 RX ORDER — SODIUM CHLORIDE, SODIUM LACTATE, POTASSIUM CHLORIDE, CALCIUM CHLORIDE 600; 310; 30; 20 MG/100ML; MG/100ML; MG/100ML; MG/100ML
100 INJECTION, SOLUTION INTRAVENOUS CONTINUOUS
Status: DISCONTINUED | OUTPATIENT
Start: 2023-02-09 | End: 2023-02-09 | Stop reason: HOSPADM

## 2023-02-09 RX ORDER — LIDOCAINE HYDROCHLORIDE 10 MG/ML
0.5 INJECTION, SOLUTION EPIDURAL; INFILTRATION; INTRACAUDAL; PERINEURAL ONCE AS NEEDED
Status: DISCONTINUED | OUTPATIENT
Start: 2023-02-09 | End: 2023-02-09 | Stop reason: HOSPADM

## 2023-02-09 RX ORDER — HEPARIN SODIUM 1000 [USP'U]/ML
INJECTION, SOLUTION INTRAVENOUS; SUBCUTANEOUS AS NEEDED
Status: DISCONTINUED | OUTPATIENT
Start: 2023-02-09 | End: 2023-02-09 | Stop reason: SURG

## 2023-02-09 RX ORDER — FLUMAZENIL 0.1 MG/ML
0.2 INJECTION INTRAVENOUS AS NEEDED
Status: DISCONTINUED | OUTPATIENT
Start: 2023-02-09 | End: 2023-02-09 | Stop reason: HOSPADM

## 2023-02-09 RX ORDER — LABETALOL HYDROCHLORIDE 5 MG/ML
5 INJECTION, SOLUTION INTRAVENOUS
Status: DISCONTINUED | OUTPATIENT
Start: 2023-02-09 | End: 2023-02-09 | Stop reason: HOSPADM

## 2023-02-09 RX ORDER — SODIUM CHLORIDE 0.9 % (FLUSH) 0.9 %
3-10 SYRINGE (ML) INJECTION AS NEEDED
Status: DISCONTINUED | OUTPATIENT
Start: 2023-02-09 | End: 2023-02-09 | Stop reason: HOSPADM

## 2023-02-09 RX ORDER — MIDAZOLAM HYDROCHLORIDE 1 MG/ML
1 INJECTION INTRAMUSCULAR; INTRAVENOUS
Status: DISCONTINUED | OUTPATIENT
Start: 2023-02-09 | End: 2023-02-09 | Stop reason: HOSPADM

## 2023-02-09 RX ORDER — LIDOCAINE HYDROCHLORIDE 10 MG/ML
INJECTION, SOLUTION INFILTRATION; PERINEURAL AS NEEDED
Status: DISCONTINUED | OUTPATIENT
Start: 2023-02-09 | End: 2023-02-09 | Stop reason: HOSPADM

## 2023-02-09 RX ORDER — SODIUM CHLORIDE 0.9 % (FLUSH) 0.9 %
3 SYRINGE (ML) INJECTION AS NEEDED
Status: DISCONTINUED | OUTPATIENT
Start: 2023-02-09 | End: 2023-02-09 | Stop reason: HOSPADM

## 2023-02-09 RX ORDER — DROPERIDOL 2.5 MG/ML
0.62 INJECTION, SOLUTION INTRAMUSCULAR; INTRAVENOUS ONCE AS NEEDED
Status: DISCONTINUED | OUTPATIENT
Start: 2023-02-09 | End: 2023-02-09 | Stop reason: HOSPADM

## 2023-02-09 RX ORDER — NALOXONE HCL 0.4 MG/ML
0.04 VIAL (ML) INJECTION AS NEEDED
Status: DISCONTINUED | OUTPATIENT
Start: 2023-02-09 | End: 2023-02-09 | Stop reason: HOSPADM

## 2023-02-09 RX ORDER — FENTANYL CITRATE 50 UG/ML
25 INJECTION, SOLUTION INTRAMUSCULAR; INTRAVENOUS
Status: DISCONTINUED | OUTPATIENT
Start: 2023-02-09 | End: 2023-02-09 | Stop reason: HOSPADM

## 2023-02-09 RX ORDER — ONDANSETRON 2 MG/ML
INJECTION INTRAMUSCULAR; INTRAVENOUS AS NEEDED
Status: DISCONTINUED | OUTPATIENT
Start: 2023-02-09 | End: 2023-02-09 | Stop reason: SURG

## 2023-02-09 RX ORDER — SODIUM CHLORIDE, SODIUM LACTATE, POTASSIUM CHLORIDE, CALCIUM CHLORIDE 600; 310; 30; 20 MG/100ML; MG/100ML; MG/100ML; MG/100ML
1000 INJECTION, SOLUTION INTRAVENOUS CONTINUOUS
Status: DISCONTINUED | OUTPATIENT
Start: 2023-02-09 | End: 2023-02-09 | Stop reason: HOSPADM

## 2023-02-09 RX ORDER — ACETAMINOPHEN 500 MG
1000 TABLET ORAL ONCE
Status: COMPLETED | OUTPATIENT
Start: 2023-02-09 | End: 2023-02-09

## 2023-02-09 RX ORDER — BUPIVACAINE HCL/0.9 % NACL/PF 0.1 %
2 PLASTIC BAG, INJECTION (ML) EPIDURAL ONCE
Status: COMPLETED | OUTPATIENT
Start: 2023-02-09 | End: 2023-02-09

## 2023-02-09 RX ORDER — HYDROMORPHONE HYDROCHLORIDE 1 MG/ML
0.5 INJECTION, SOLUTION INTRAMUSCULAR; INTRAVENOUS; SUBCUTANEOUS
Status: DISCONTINUED | OUTPATIENT
Start: 2023-02-09 | End: 2023-02-09 | Stop reason: HOSPADM

## 2023-02-09 RX ORDER — OXYCODONE AND ACETAMINOPHEN 10; 325 MG/1; MG/1
1 TABLET ORAL ONCE AS NEEDED
Status: COMPLETED | OUTPATIENT
Start: 2023-02-09 | End: 2023-02-09

## 2023-02-09 RX ORDER — SODIUM CHLORIDE 9 MG/ML
40 INJECTION, SOLUTION INTRAVENOUS AS NEEDED
Status: DISCONTINUED | OUTPATIENT
Start: 2023-02-09 | End: 2023-02-09 | Stop reason: HOSPADM

## 2023-02-09 RX ORDER — ONDANSETRON 2 MG/ML
4 INJECTION INTRAMUSCULAR; INTRAVENOUS
Status: DISCONTINUED | OUTPATIENT
Start: 2023-02-09 | End: 2023-02-09 | Stop reason: HOSPADM

## 2023-02-09 RX ORDER — OXYCODONE HYDROCHLORIDE AND ACETAMINOPHEN 5; 325 MG/1; MG/1
1 TABLET ORAL ONCE AS NEEDED
Status: DISCONTINUED | OUTPATIENT
Start: 2023-02-09 | End: 2023-02-09 | Stop reason: HOSPADM

## 2023-02-09 RX ORDER — IBUPROFEN 600 MG/1
600 TABLET ORAL ONCE AS NEEDED
Status: DISCONTINUED | OUTPATIENT
Start: 2023-02-09 | End: 2023-02-09 | Stop reason: HOSPADM

## 2023-02-09 RX ADMIN — HEPARIN SODIUM 1000 UNITS: 1000 INJECTION, SOLUTION INTRAVENOUS; SUBCUTANEOUS at 13:14

## 2023-02-09 RX ADMIN — Medication 2 G: at 13:00

## 2023-02-09 RX ADMIN — HYDROMORPHONE HYDROCHLORIDE 0.5 MG: 1 INJECTION, SOLUTION INTRAMUSCULAR; INTRAVENOUS; SUBCUTANEOUS at 14:45

## 2023-02-09 RX ADMIN — ACETAMINOPHEN 1000 MG: 500 TABLET, FILM COATED ORAL at 11:56

## 2023-02-09 RX ADMIN — FENTANYL CITRATE 100 MCG: 50 INJECTION, SOLUTION INTRAMUSCULAR; INTRAVENOUS at 12:59

## 2023-02-09 RX ADMIN — RIVAROXABAN 20 MG: 20 TABLET, FILM COATED ORAL at 15:06

## 2023-02-09 RX ADMIN — HEPARIN SODIUM 5000 UNITS: 1000 INJECTION, SOLUTION INTRAVENOUS; SUBCUTANEOUS at 13:22

## 2023-02-09 RX ADMIN — ONDANSETRON 4 MG: 2 INJECTION INTRAMUSCULAR; INTRAVENOUS at 13:56

## 2023-02-09 RX ADMIN — OXYCODONE AND ACETAMINOPHEN 1 TABLET: 325; 10 TABLET ORAL at 15:06

## 2023-02-09 RX ADMIN — PROPOFOL 100 MCG/KG/MIN: 10 INJECTION, EMULSION INTRAVENOUS at 13:01

## 2023-02-09 RX ADMIN — SODIUM CHLORIDE, POTASSIUM CHLORIDE, SODIUM LACTATE AND CALCIUM CHLORIDE: 600; 310; 30; 20 INJECTION, SOLUTION INTRAVENOUS at 13:33

## 2023-02-09 RX ADMIN — SODIUM CHLORIDE, POTASSIUM CHLORIDE, SODIUM LACTATE AND CALCIUM CHLORIDE 1000 ML: 600; 310; 30; 20 INJECTION, SOLUTION INTRAVENOUS at 10:29
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
## 2023-02-09 NOTE — ANESTHESIA PREPROCEDURE EVALUATION
Anesthesia Evaluation     Patient summary reviewed   no history of anesthetic complications:  NPO Solid Status: > 8 hours  NPO Liquid Status: > 6 hours           Airway   Mallampati: II  TM distance: >3 FB  Neck ROM: full  Dental    (+) edentulous    Pulmonary    (+) a smoker Former,   (-) COPD, asthma, sleep apnea  Cardiovascular   Exercise tolerance: poor (<4 METS)    ECG reviewed  PT is on anticoagulation therapy    (+) PVD (AAA s/p graft; claudication ), hyperlipidemia,   (-) pacemaker, past MI, angina, cardiac stents      Neuro/Psych  (-) seizures, TIA, CVA (SDH)  GI/Hepatic/Renal/Endo    (-) GERD, liver disease, no renal disease, diabetes    Musculoskeletal     Abdominal    Substance History      OB/GYN          Other                          Anesthesia Plan    ASA 4     MAC     (SDH within 3 months, required evacuation )  intravenous induction     Anesthetic plan, risks, benefits, and alternatives have been provided, discussed and informed consent has been obtained with: patient.

## 2023-02-09 NOTE — ANESTHESIA POSTPROCEDURE EVALUATION
Patient: Fiona Laguerre    Procedure Summary     Date: 02/09/23 Room / Location: [facility] OR  /  PAD HYBRID OR 12    Anesthesia Start: 1254 Anesthesia Stop: 1420    Procedure: AORTAGRAM, RIGHT LOWER EXTREMITY ANGIOGRAM, POSSIBLE INTERVENTION (Right: Groin) Diagnosis:       Atherosclerosis of native artery of right lower extremity with rest pain (HCC)      Peripheral arterial disease with history of revascularization (HCC)      (Atherosclerosis of native artery of right lower extremity with rest pain (HCC) [I70.221])      (Peripheral arterial disease with history of revascularization (HCC) [I73.9, Z98.890])    Surgeons: Karson Rivera MD Provider: Delbert Mcnally CRNA    Anesthesia Type: MAC ASA Status: 4          Anesthesia Type: MAC    Vitals  Vitals Value Taken Time   /95 02/09/23 1526   Temp 97.3 °F (36.3 °C) 02/09/23 1500   Pulse 69 02/09/23 1526   Resp 16 02/09/23 1526   SpO2 90 % 02/09/23 1526           Post Anesthesia Care and Evaluation    Patient location during evaluation: PACU  Patient participation: complete - patient participated  Level of consciousness: awake  Pain management: adequate    Airway patency: patent  Anesthetic complications: No anesthetic complications  PONV Status: none  Cardiovascular status: acceptable  Respiratory status: acceptable  Hydration status: acceptable

## 2023-02-09 NOTE — INTERVAL H&P NOTE
H&P reviewed. The patient was examined and there are no changes to the H&P.  For aortogram and RLE angiogram, with possible intervention. Risks of angiogram were discussed.  These include, but are not limited to, bleeding, infection, vessel damage, nerve damage, embolus, and loss of limb.  The patient understands these risks and wishes to proceed with procedure.

## 2023-02-16 ENCOUNTER — TELEPHONE (OUTPATIENT)
Dept: VASCULAR SURGERY | Facility: CLINIC | Age: 48
End: 2023-02-16
Payer: MEDICAID

## 2023-02-17 ENCOUNTER — HOSPITAL ENCOUNTER (OUTPATIENT)
Dept: ULTRASOUND IMAGING | Facility: HOSPITAL | Age: 48
Discharge: HOME | End: 2023-02-17
Admitting: SURGERY
Payer: MEDICAID

## 2023-02-17 ENCOUNTER — OFFICE VISIT (OUTPATIENT)
Dept: VASCULAR SURGERY | Facility: CLINIC | Age: 48
End: 2023-02-17
Payer: MEDICAID

## 2023-02-17 VITALS
SYSTOLIC BLOOD PRESSURE: 128 MMHG | BODY MASS INDEX: 24.05 KG/M2 | WEIGHT: 168 LBS | DIASTOLIC BLOOD PRESSURE: 76 MMHG | HEART RATE: 70 BPM | HEIGHT: 70 IN | OXYGEN SATURATION: 98 %

## 2023-02-17 DIAGNOSIS — I73.9 PERIPHERAL ARTERIAL DISEASE WITH HISTORY OF REVASCULARIZATION: ICD-10-CM

## 2023-02-17 DIAGNOSIS — I70.221 ATHEROSCLEROSIS OF NATIVE ARTERY OF RIGHT LOWER EXTREMITY WITH REST PAIN: ICD-10-CM

## 2023-02-17 DIAGNOSIS — E78.2 MIXED HYPERLIPIDEMIA: ICD-10-CM

## 2023-02-17 DIAGNOSIS — I10 ESSENTIAL HYPERTENSION: ICD-10-CM

## 2023-02-17 DIAGNOSIS — I74.09 AORTOILIAC OCCLUSIVE DISEASE: Primary | ICD-10-CM

## 2023-02-17 DIAGNOSIS — Z98.890 PERIPHERAL ARTERIAL DISEASE WITH HISTORY OF REVASCULARIZATION: ICD-10-CM

## 2023-02-17 DIAGNOSIS — I70.211 ATHEROSCLEROSIS OF NATIVE ARTERY OF RIGHT LOWER EXTREMITY WITH INTERMITTENT CLAUDICATION: ICD-10-CM

## 2023-02-17 PROCEDURE — 93970 EXTREMITY STUDY: CPT | Performed by: SURGERY

## 2023-02-17 PROCEDURE — 93970 EXTREMITY STUDY: CPT

## 2023-02-17 PROCEDURE — 99214 OFFICE O/P EST MOD 30 MIN: CPT | Performed by: SURGERY

## 2023-02-17 RX ORDER — CILOSTAZOL 100 MG/1
100 TABLET ORAL 2 TIMES DAILY
Qty: 60 TABLET | Refills: 5 | Status: SHIPPED | OUTPATIENT
Start: 2023-02-17
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
## 2023-05-01 ENCOUNTER — HOSPITAL ENCOUNTER (EMERGENCY)
Facility: HOSPITAL | Age: 48
Discharge: HOME | End: 2023-05-01
Admitting: FAMILY MEDICINE
Payer: MEDICAID

## 2023-05-01 ENCOUNTER — APPOINTMENT (OUTPATIENT)
Dept: CT IMAGING | Facility: HOSPITAL | Age: 48
End: 2023-05-01
Payer: MEDICAID

## 2023-05-01 ENCOUNTER — APPOINTMENT (OUTPATIENT)
Dept: ULTRASOUND IMAGING | Facility: HOSPITAL | Age: 48
End: 2023-05-01
Payer: MEDICAID

## 2023-05-01 VITALS
RESPIRATION RATE: 20 BRPM | SYSTOLIC BLOOD PRESSURE: 143 MMHG | OXYGEN SATURATION: 97 % | HEART RATE: 98 BPM | HEIGHT: 70 IN | WEIGHT: 168 LBS | BODY MASS INDEX: 24.05 KG/M2 | DIASTOLIC BLOOD PRESSURE: 87 MMHG | TEMPERATURE: 98.7 F

## 2023-05-01 DIAGNOSIS — M79.604 RIGHT LEG PAIN: Primary | ICD-10-CM

## 2023-05-01 DIAGNOSIS — Z86.79 HISTORY OF PERIPHERAL ARTERIAL DISEASE: ICD-10-CM

## 2023-05-01 LAB
ALBUMIN SERPL-MCNC: 4.2 G/DL (ref 3.5–5.2)
ALBUMIN/GLOB SERPL: 1.4 G/DL
ALP SERPL-CCNC: 77 U/L (ref 39–117)
ALT SERPL W P-5'-P-CCNC: 12 U/L (ref 1–41)
ANION GAP SERPL CALCULATED.3IONS-SCNC: 10 MMOL/L (ref 5–15)
APTT PPP: 38.7 SECONDS (ref 24.1–35)
AST SERPL-CCNC: 19 U/L (ref 1–40)
BASOPHILS # BLD AUTO: 0.06 10*3/MM3 (ref 0–0.2)
BASOPHILS NFR BLD AUTO: 1.1 % (ref 0–1.5)
BILIRUB SERPL-MCNC: 0.4 MG/DL (ref 0–1.2)
BUN SERPL-MCNC: 4 MG/DL (ref 6–20)
BUN/CREAT SERPL: 5.1 (ref 7–25)
CALCIUM SPEC-SCNC: 9.3 MG/DL (ref 8.6–10.5)
CHLORIDE SERPL-SCNC: 102 MMOL/L (ref 98–107)
CO2 SERPL-SCNC: 25 MMOL/L (ref 22–29)
CREAT SERPL-MCNC: 0.79 MG/DL (ref 0.76–1.27)
DEPRECATED RDW RBC AUTO: 45.9 FL (ref 37–54)
EGFRCR SERPLBLD CKD-EPI 2021: 110.3 ML/MIN/1.73
EOSINOPHIL # BLD AUTO: 0.13 10*3/MM3 (ref 0–0.4)
EOSINOPHIL NFR BLD AUTO: 2.5 % (ref 0.3–6.2)
ERYTHROCYTE [DISTWIDTH] IN BLOOD BY AUTOMATED COUNT: 13.6 % (ref 12.3–15.4)
GLOBULIN UR ELPH-MCNC: 3 GM/DL
GLUCOSE SERPL-MCNC: 93 MG/DL (ref 65–99)
HCT VFR BLD AUTO: 41.3 % (ref 37.5–51)
HGB BLD-MCNC: 13.2 G/DL (ref 13–17.7)
IMM GRANULOCYTES # BLD AUTO: 0.01 10*3/MM3 (ref 0–0.05)
IMM GRANULOCYTES NFR BLD AUTO: 0.2 % (ref 0–0.5)
INR PPP: 1.32 (ref 0.91–1.09)
LYMPHOCYTES # BLD AUTO: 1.76 10*3/MM3 (ref 0.7–3.1)
LYMPHOCYTES NFR BLD AUTO: 33.4 % (ref 19.6–45.3)
MCH RBC QN AUTO: 29.1 PG (ref 26.6–33)
MCHC RBC AUTO-ENTMCNC: 32 G/DL (ref 31.5–35.7)
MCV RBC AUTO: 91.2 FL (ref 79–97)
MONOCYTES # BLD AUTO: 0.54 10*3/MM3 (ref 0.1–0.9)
MONOCYTES NFR BLD AUTO: 10.2 % (ref 5–12)
NEUTROPHILS NFR BLD AUTO: 2.77 10*3/MM3 (ref 1.7–7)
NEUTROPHILS NFR BLD AUTO: 52.6 % (ref 42.7–76)
NRBC BLD AUTO-RTO: 0 /100 WBC (ref 0–0.2)
PLATELET # BLD AUTO: 260 10*3/MM3 (ref 140–450)
PMV BLD AUTO: 9.1 FL (ref 6–12)
POTASSIUM SERPL-SCNC: 3.7 MMOL/L (ref 3.5–5.2)
PROT SERPL-MCNC: 7.2 G/DL (ref 6–8.5)
PROTHROMBIN TIME: 16.5 SECONDS (ref 11.8–14.8)
RBC # BLD AUTO: 4.53 10*6/MM3 (ref 4.14–5.8)
SODIUM SERPL-SCNC: 137 MMOL/L (ref 136–145)
WBC NRBC COR # BLD: 5.27 10*3/MM3 (ref 3.4–10.8)

## 2023-05-01 PROCEDURE — 99283 EMERGENCY DEPT VISIT LOW MDM: CPT

## 2023-05-01 PROCEDURE — 85730 THROMBOPLASTIN TIME PARTIAL: CPT | Performed by: NURSE PRACTITIONER

## 2023-05-01 PROCEDURE — 80053 COMPREHEN METABOLIC PANEL: CPT | Performed by: NURSE PRACTITIONER

## 2023-05-01 PROCEDURE — 75635 CT ANGIO ABDOMINAL ARTERIES: CPT

## 2023-05-01 PROCEDURE — 93926 LOWER EXTREMITY STUDY: CPT

## 2023-05-01 PROCEDURE — 25510000001 IOPAMIDOL PER 1 ML: Performed by: NURSE PRACTITIONER

## 2023-05-01 PROCEDURE — 85025 COMPLETE CBC W/AUTO DIFF WBC: CPT | Performed by: NURSE PRACTITIONER

## 2023-05-01 PROCEDURE — 85610 PROTHROMBIN TIME: CPT | Performed by: NURSE PRACTITIONER

## 2023-05-01 RX ORDER — SODIUM CHLORIDE 0.9 % (FLUSH) 0.9 %
10 SYRINGE (ML) INJECTION AS NEEDED
Status: DISCONTINUED | OUTPATIENT
Start: 2023-05-01 | End: 2023-05-01 | Stop reason: HOSPADM

## 2023-05-01 RX ADMIN — IOPAMIDOL 100 ML: 755 INJECTION, SOLUTION INTRAVENOUS at 17:51
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
## 2023-06-02 ENCOUNTER — HOSPITAL ENCOUNTER (OUTPATIENT)
Dept: ULTRASOUND IMAGING | Facility: HOSPITAL | Age: 48
Discharge: HOME | End: 2023-06-02

## 2023-06-02 ENCOUNTER — OFFICE VISIT (OUTPATIENT)
Dept: VASCULAR SURGERY | Facility: CLINIC | Age: 48
End: 2023-06-02

## 2023-06-02 VITALS
HEART RATE: 114 BPM | DIASTOLIC BLOOD PRESSURE: 84 MMHG | HEIGHT: 70 IN | OXYGEN SATURATION: 98 % | WEIGHT: 168 LBS | BODY MASS INDEX: 24.05 KG/M2 | SYSTOLIC BLOOD PRESSURE: 140 MMHG

## 2023-06-02 DIAGNOSIS — I73.9 PERIPHERAL ARTERIAL DISEASE WITH HISTORY OF REVASCULARIZATION: Primary | ICD-10-CM

## 2023-06-02 DIAGNOSIS — Z98.890 PERIPHERAL ARTERIAL DISEASE WITH HISTORY OF REVASCULARIZATION: Primary | ICD-10-CM

## 2023-06-02 DIAGNOSIS — I73.9 PERIPHERAL ARTERIAL DISEASE WITH HISTORY OF REVASCULARIZATION: ICD-10-CM

## 2023-06-02 DIAGNOSIS — I10 ESSENTIAL HYPERTENSION: ICD-10-CM

## 2023-06-02 DIAGNOSIS — E78.2 MIXED HYPERLIPIDEMIA: ICD-10-CM

## 2023-06-02 DIAGNOSIS — Z79.01 CHRONIC ANTICOAGULATION: ICD-10-CM

## 2023-06-02 DIAGNOSIS — Z98.890 PERIPHERAL ARTERIAL DISEASE WITH HISTORY OF REVASCULARIZATION: ICD-10-CM

## 2023-06-02 PROCEDURE — 93923 UPR/LXTR ART STDY 3+ LVLS: CPT
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
## 2023-06-05 RX ORDER — CLOPIDOGREL BISULFATE 75 MG/1
TABLET ORAL
Qty: 30 TABLET | Refills: 0 | Status: SHIPPED | OUTPATIENT
Start: 2023-06-05

## 2023-10-24 ENCOUNTER — TELEPHONE (OUTPATIENT)
Dept: VASCULAR SURGERY | Facility: CLINIC | Age: 48
End: 2023-10-24
Payer: MEDICAID

## 2023-10-24 NOTE — TELEPHONE ENCOUNTER
Spoke with patient and there are no samples available for Pletal and Plavix, instructed to notify provider. Patient verbalized understanding.

## 2023-10-24 NOTE — TELEPHONE ENCOUNTER
PT MOTHER IS AWARE OF APPT BEING SCHEDULED WITH [facility] FOR 12/18/2023 DUE TO DR ELLINGTON LEAVING THE CLINIC

## 2023-12-15 ENCOUNTER — TELEPHONE (OUTPATIENT)
Dept: VASCULAR SURGERY | Facility: CLINIC | Age: 48
End: 2023-12-15
Payer: MEDICAID

## 2023-12-15 NOTE — TELEPHONE ENCOUNTER
Called patient to confirm appointment on 12/18/23 at 3:45. Patient did not answer could not leave VM.

## 2023-12-22 DIAGNOSIS — I73.9 PAD (PERIPHERAL ARTERY DISEASE): Primary | ICD-10-CM

## 2023-12-26 ENCOUNTER — TELEPHONE (OUTPATIENT)
Dept: VASCULAR SURGERY | Facility: CLINIC | Age: 48
End: 2023-12-26
Payer: MEDICAID

## 2023-12-26 NOTE — TELEPHONE ENCOUNTER
UNABLE TO CONFIRM TESTING OR APPOINTMENT WITH [facility] ON 12/27/23. LEFT VM.

## 2023-12-27 ENCOUNTER — OFFICE VISIT (OUTPATIENT)
Dept: VASCULAR SURGERY | Facility: CLINIC | Age: 48
End: 2023-12-27
Payer: MEDICAID

## 2023-12-27 ENCOUNTER — HOSPITAL ENCOUNTER (OUTPATIENT)
Dept: ULTRASOUND IMAGING | Facility: HOSPITAL | Age: 48
Discharge: HOME | End: 2023-12-27
Payer: MEDICAID

## 2023-12-27 VITALS
OXYGEN SATURATION: 97 % | HEART RATE: 108 BPM | SYSTOLIC BLOOD PRESSURE: 132 MMHG | WEIGHT: 170 LBS | BODY MASS INDEX: 24.34 KG/M2 | DIASTOLIC BLOOD PRESSURE: 80 MMHG | HEIGHT: 70 IN

## 2023-12-27 DIAGNOSIS — Z98.890 PERIPHERAL ARTERIAL DISEASE WITH HISTORY OF REVASCULARIZATION: ICD-10-CM

## 2023-12-27 DIAGNOSIS — I73.9 PERIPHERAL ARTERIAL DISEASE WITH HISTORY OF REVASCULARIZATION: ICD-10-CM

## 2023-12-27 DIAGNOSIS — I73.9 PAD (PERIPHERAL ARTERY DISEASE): ICD-10-CM

## 2023-12-27 DIAGNOSIS — I73.9 PAD (PERIPHERAL ARTERY DISEASE): Primary | ICD-10-CM

## 2023-12-27 DIAGNOSIS — E78.2 MIXED HYPERLIPIDEMIA: ICD-10-CM

## 2023-12-27 DIAGNOSIS — I10 ESSENTIAL HYPERTENSION: ICD-10-CM

## 2023-12-27 PROCEDURE — 93923 UPR/LXTR ART STDY 3+ LVLS: CPT

## 2023-12-27 RX ORDER — CLOPIDOGREL BISULFATE 75 MG/1
75 TABLET ORAL DAILY
Qty: 30 TABLET | Refills: 0 | Status: SHIPPED | OUTPATIENT
Start: 2023-12-27 | End: 2024-01-26
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
## 2024-02-08 ENCOUNTER — TELEPHONE (OUTPATIENT)
Dept: VASCULAR SURGERY | Facility: CLINIC | Age: 49
End: 2024-02-08
Payer: MEDICAID

## 2024-02-08 RX ORDER — CLOPIDOGREL BISULFATE 75 MG/1
75 TABLET ORAL DAILY
Qty: 30 TABLET | Refills: 0 | Status: SHIPPED | OUTPATIENT
Start: 2024-02-08 | End: 2024-03-09

## 2024-02-08 NOTE — TELEPHONE ENCOUNTER
[facility] called stating they were trying to get a refill on Plavix from 06/2023 and order by Dr. Rivera. After looking over Daly Singh note from 12/27/23. It looks like we had discussed discontinuing his Xarelto,Pletal. I informed the pharmacy we could not approve the refill from 06/2023 due to Dr. Rivera no longer being a physician at our office. Per the last office note of 12/27/23 patient request refills. The note does not state which medications.The note states we will give a 30 day supply of a medication Told the pharmacy I would discuss which medication with providers.

## 2024-06-17 ENCOUNTER — TELEPHONE (OUTPATIENT)
Dept: PODIATRY | Facility: CLINIC | Age: 49
End: 2024-06-17
Payer: MEDICAID

## 2024-06-17 NOTE — TELEPHONE ENCOUNTER
Hub to relay  Called patient regarding appt on 06/18/2024. Left message for patient to return call if any questions or concerns arise.
